# Patient Record
Sex: MALE | Race: WHITE | NOT HISPANIC OR LATINO | URBAN - METROPOLITAN AREA
[De-identification: names, ages, dates, MRNs, and addresses within clinical notes are randomized per-mention and may not be internally consistent; named-entity substitution may affect disease eponyms.]

---

## 2022-10-12 ENCOUNTER — NEW PATIENT (OUTPATIENT)
Dept: URBAN - METROPOLITAN AREA CLINIC 6 | Facility: CLINIC | Age: 72
End: 2022-10-12

## 2022-10-12 DIAGNOSIS — H25.813: ICD-10-CM

## 2022-10-12 PROCEDURE — 92004 COMPRE OPH EXAM NEW PT 1/>: CPT

## 2022-10-12 ASSESSMENT — TONOMETRY
OD_IOP_MMHG: 18
OS_IOP_MMHG: 20

## 2022-10-12 ASSESSMENT — VISUAL ACUITY
OD_CC: 20/100
OS_GLARE: 20/400
OS_PH: 20/70
OS_CC: 20/100

## 2022-11-09 ENCOUNTER — PRE-OP CATARACT MEASUREMENTS (OUTPATIENT)
Dept: URBAN - METROPOLITAN AREA CLINIC 6 | Facility: CLINIC | Age: 72
End: 2022-11-09

## 2022-11-09 DIAGNOSIS — H25.813: ICD-10-CM

## 2022-11-09 PROCEDURE — 92014 COMPRE OPH EXAM EST PT 1/>: CPT

## 2022-11-09 PROCEDURE — 92136 OPHTHALMIC BIOMETRY: CPT

## 2022-11-09 ASSESSMENT — VISUAL ACUITY
OS_CC: 20/100
OS_PH: 20/70
OU_CC: J3
OD_CC: 20/100+1
OD_PH: 20/70-1

## 2022-11-09 ASSESSMENT — TONOMETRY
OS_IOP_MMHG: 18
OD_IOP_MMHG: 15

## 2023-02-23 ENCOUNTER — ESTABLISHED COMPREHENSIVE EXAM (OUTPATIENT)
Dept: URBAN - METROPOLITAN AREA CLINIC 6 | Facility: CLINIC | Age: 73
End: 2023-02-23

## 2023-02-23 DIAGNOSIS — H25.813: ICD-10-CM

## 2023-02-23 DIAGNOSIS — H35.033: ICD-10-CM

## 2023-02-23 PROCEDURE — 76519 ECHO EXAM OF EYE: CPT

## 2023-02-23 PROCEDURE — 92014 COMPRE OPH EXAM EST PT 1/>: CPT

## 2023-02-23 ASSESSMENT — VISUAL ACUITY
OS_CC: 20/100
OD_CC: 20/200
OS_PH: 20/70
OU_CC: J3
OD_PH: 20/200

## 2023-02-23 ASSESSMENT — KERATOMETRY
OS_AXISANGLE_DEGREES: 112
OD_AXISANGLE_DEGREES: 58
OD_K1POWER_DIOPTERS: 41.25
OD_AXISANGLE2_DEGREES: 148
OS_K1POWER_DIOPTERS: 42.75
OS_K2POWER_DIOPTERS: 44.25
OS_AXISANGLE2_DEGREES: 22
OD_K2POWER_DIOPTERS: 43.25

## 2023-02-23 ASSESSMENT — TONOMETRY
OS_IOP_MMHG: 19
OD_IOP_MMHG: 19

## 2023-03-20 ENCOUNTER — SURGERY/PROCEDURE (OUTPATIENT)
Dept: URBAN - METROPOLITAN AREA SURGICAL CENTER 6 | Facility: SURGICAL CENTER | Age: 73
End: 2023-03-20

## 2023-03-20 DIAGNOSIS — H25.811: ICD-10-CM

## 2023-03-20 PROCEDURE — 66984 XCAPSL CTRC RMVL W/O ECP: CPT

## 2023-03-21 ENCOUNTER — 1 DAY POST-OP (OUTPATIENT)
Dept: URBAN - METROPOLITAN AREA CLINIC 6 | Facility: CLINIC | Age: 73
End: 2023-03-21

## 2023-03-21 DIAGNOSIS — H25.812: ICD-10-CM

## 2023-03-21 DIAGNOSIS — Z96.1: ICD-10-CM

## 2023-03-21 PROCEDURE — 99024 POSTOP FOLLOW-UP VISIT: CPT

## 2023-03-21 PROCEDURE — 76519 ECHO EXAM OF EYE: CPT | Mod: 26,LT

## 2023-03-21 ASSESSMENT — KERATOMETRY
OS_AXISANGLE2_DEGREES: 22
OD_K1POWER_DIOPTERS: 41.25
OD_K1POWER_DIOPTERS: 41.25
OS_K2POWER_DIOPTERS: 44.25
OD_K2POWER_DIOPTERS: 43.25
OS_AXISANGLE2_DEGREES: 22
OS_AXISANGLE_DEGREES: 112
OS_AXISANGLE_DEGREES: 112
OD_AXISANGLE2_DEGREES: 148
OD_AXISANGLE_DEGREES: 58
OD_K2POWER_DIOPTERS: 43.25
OS_K2POWER_DIOPTERS: 44.25
OS_K1POWER_DIOPTERS: 42.75
OD_AXISANGLE2_DEGREES: 148
OD_AXISANGLE_DEGREES: 58
OS_K1POWER_DIOPTERS: 42.75

## 2023-03-21 ASSESSMENT — TONOMETRY
OD_IOP_MMHG: 27
OS_IOP_MMHG: 13
OS_IOP_MMHG: 18
OD_IOP_MMHG: 22

## 2023-03-21 ASSESSMENT — VISUAL ACUITY: OD_SC: 20/60-1

## 2023-03-30 ENCOUNTER — POST-OP CHECK (OUTPATIENT)
Dept: URBAN - METROPOLITAN AREA CLINIC 6 | Facility: CLINIC | Age: 73
End: 2023-03-30

## 2023-03-30 DIAGNOSIS — Z96.1: ICD-10-CM

## 2023-03-30 DIAGNOSIS — H25.812: ICD-10-CM

## 2023-03-30 PROCEDURE — 92136 OPHTHALMIC BIOMETRY: CPT | Mod: 26,LT

## 2023-03-30 PROCEDURE — 99024 POSTOP FOLLOW-UP VISIT: CPT

## 2023-04-03 ASSESSMENT — KERATOMETRY
OD_K2POWER_DIOPTERS: 43.25
OS_K2POWER_DIOPTERS: 44.25
OD_K1POWER_DIOPTERS: 41.25
OD_AXISANGLE2_DEGREES: 148
OS_AXISANGLE_DEGREES: 112
OS_K1POWER_DIOPTERS: 42.75
OD_AXISANGLE_DEGREES: 58
OS_AXISANGLE2_DEGREES: 22

## 2023-04-03 ASSESSMENT — TONOMETRY
OD_IOP_MMHG: 21
OS_IOP_MMHG: 17

## 2023-04-03 ASSESSMENT — VISUAL ACUITY
OD_SC: 20/60+2
OS_PH: 20/200
OS_SC: 20/400
OD_PH: 20/50

## 2023-04-10 ENCOUNTER — SURGERY/PROCEDURE (OUTPATIENT)
Dept: URBAN - METROPOLITAN AREA SURGICAL CENTER 6 | Facility: SURGICAL CENTER | Age: 73
End: 2023-04-10

## 2023-04-10 DIAGNOSIS — H25.812: ICD-10-CM

## 2023-04-10 PROCEDURE — 66984 XCAPSL CTRC RMVL W/O ECP: CPT | Mod: 79,LT

## 2023-04-11 ENCOUNTER — 1 DAY POST-OP (OUTPATIENT)
Dept: URBAN - METROPOLITAN AREA CLINIC 6 | Facility: CLINIC | Age: 73
End: 2023-04-11

## 2023-04-11 DIAGNOSIS — H25.812: ICD-10-CM

## 2023-04-11 DIAGNOSIS — Z96.1: ICD-10-CM

## 2023-04-11 PROCEDURE — 99024 POSTOP FOLLOW-UP VISIT: CPT

## 2023-04-11 ASSESSMENT — KERATOMETRY
OD_K2POWER_DIOPTERS: 43.25
OD_K2POWER_DIOPTERS: 43.25
OS_K2POWER_DIOPTERS: 44.25
OD_K1POWER_DIOPTERS: 41.25
OD_AXISANGLE_DEGREES: 58
OD_AXISANGLE2_DEGREES: 148
OS_K1POWER_DIOPTERS: 42.75
OD_AXISANGLE_DEGREES: 58
OS_AXISANGLE_DEGREES: 112
OS_K2POWER_DIOPTERS: 44.25
OD_K1POWER_DIOPTERS: 41.25
OS_K1POWER_DIOPTERS: 42.75
OS_AXISANGLE2_DEGREES: 22
OD_AXISANGLE2_DEGREES: 148
OS_AXISANGLE2_DEGREES: 22
OS_AXISANGLE_DEGREES: 112

## 2023-04-11 ASSESSMENT — TONOMETRY
OD_IOP_MMHG: 19
OS_IOP_MMHG: 28

## 2023-04-11 ASSESSMENT — VISUAL ACUITY
OD_SC: 20/50-1
OU_SC: J7-2
OD_PH: 20/50+2
OS_PH: 20/40-1
OS_SC: 20/40-1

## 2024-11-02 ENCOUNTER — HOSPITAL ENCOUNTER (INPATIENT)
Facility: HOSPITAL | Age: 74
LOS: 1 days | Discharge: HOME HEALTH | DRG: 101 | End: 2024-11-05
Attending: EMERGENCY MEDICINE | Admitting: FAMILY MEDICINE
Payer: COMMERCIAL

## 2024-11-02 ENCOUNTER — APPOINTMENT (EMERGENCY)
Dept: CT IMAGING | Facility: HOSPITAL | Age: 74
DRG: 101 | End: 2024-11-02
Payer: COMMERCIAL

## 2024-11-02 DIAGNOSIS — R41.82 ALTERED MENTAL STATUS, UNSPECIFIED ALTERED MENTAL STATUS TYPE: ICD-10-CM

## 2024-11-02 DIAGNOSIS — F03.A0 MILD DEMENTIA WITHOUT BEHAVIORAL DISTURBANCE, PSYCHOTIC DISTURBANCE, MOOD DISTURBANCE, OR ANXIETY, UNSPECIFIED DEMENTIA TYPE (HCC): ICD-10-CM

## 2024-11-02 DIAGNOSIS — G40.919 BREAKTHROUGH SEIZURE (HCC): ICD-10-CM

## 2024-11-02 DIAGNOSIS — Z86.73 CHRONIC CEREBROVASCULAR ACCIDENT (CVA): ICD-10-CM

## 2024-11-02 DIAGNOSIS — R29.810 FACIAL DROOP: Primary | ICD-10-CM

## 2024-11-02 DIAGNOSIS — I63.9 STROKE (HCC): ICD-10-CM

## 2024-11-02 DIAGNOSIS — R56.9 SEIZURE (HCC): ICD-10-CM

## 2024-11-02 DIAGNOSIS — I10 PRIMARY HYPERTENSION: ICD-10-CM

## 2024-11-02 DIAGNOSIS — G83.84 TODD'S PARALYSIS (POSTEPILEPTIC) (HCC): ICD-10-CM

## 2024-11-02 PROBLEM — F03.90 DEMENTIA (HCC): Status: ACTIVE | Noted: 2024-11-02

## 2024-11-02 LAB
2HR DELTA HS TROPONIN: 11 NG/L
ANION GAP SERPL CALCULATED.3IONS-SCNC: 12 MMOL/L (ref 4–13)
APTT PPP: 23 SECONDS (ref 23–34)
BUN SERPL-MCNC: 20 MG/DL (ref 5–25)
CALCIUM SERPL-MCNC: 8.7 MG/DL (ref 8.4–10.2)
CARBAMAZEPINE SERPL-MCNC: 7.1 UG/ML (ref 4–12)
CARDIAC TROPONIN I PNL SERPL HS: 19 NG/L
CARDIAC TROPONIN I PNL SERPL HS: 8 NG/L
CHLORIDE SERPL-SCNC: 100 MMOL/L (ref 96–108)
CO2 SERPL-SCNC: 19 MMOL/L (ref 21–32)
CREAT SERPL-MCNC: 1.24 MG/DL (ref 0.6–1.3)
ERYTHROCYTE [DISTWIDTH] IN BLOOD BY AUTOMATED COUNT: 13.4 % (ref 11.6–15.1)
GFR SERPL CREATININE-BSD FRML MDRD: 56 ML/MIN/1.73SQ M
GLUCOSE SERPL-MCNC: 135 MG/DL (ref 65–140)
GLUCOSE SERPL-MCNC: 92 MG/DL (ref 65–140)
HCT VFR BLD AUTO: 40.9 % (ref 36.5–49.3)
HGB BLD-MCNC: 13.6 G/DL (ref 12–17)
INR PPP: 0.98 (ref 0.85–1.19)
MCH RBC QN AUTO: 31.6 PG (ref 26.8–34.3)
MCHC RBC AUTO-ENTMCNC: 33.3 G/DL (ref 31.4–37.4)
MCV RBC AUTO: 95 FL (ref 82–98)
PLATELET # BLD AUTO: 177 THOUSANDS/UL (ref 149–390)
PMV BLD AUTO: 9.1 FL (ref 8.9–12.7)
POTASSIUM SERPL-SCNC: 4.3 MMOL/L (ref 3.5–5.3)
PROTHROMBIN TIME: 13.7 SECONDS (ref 12.3–15)
RBC # BLD AUTO: 4.3 MILLION/UL (ref 3.88–5.62)
SODIUM SERPL-SCNC: 131 MMOL/L (ref 135–147)
TSH SERPL DL<=0.05 MIU/L-ACNC: 5.03 UIU/ML (ref 0.45–4.5)
WBC # BLD AUTO: 7.23 THOUSAND/UL (ref 4.31–10.16)

## 2024-11-02 PROCEDURE — 80156 ASSAY CARBAMAZEPINE TOTAL: CPT

## 2024-11-02 PROCEDURE — 85027 COMPLETE CBC AUTOMATED: CPT | Performed by: EMERGENCY MEDICINE

## 2024-11-02 PROCEDURE — 99285 EMERGENCY DEPT VISIT HI MDM: CPT | Performed by: EMERGENCY MEDICINE

## 2024-11-02 PROCEDURE — 36415 COLL VENOUS BLD VENIPUNCTURE: CPT | Performed by: EMERGENCY MEDICINE

## 2024-11-02 PROCEDURE — 80061 LIPID PANEL: CPT

## 2024-11-02 PROCEDURE — 70450 CT HEAD/BRAIN W/O DYE: CPT

## 2024-11-02 PROCEDURE — 84484 ASSAY OF TROPONIN QUANT: CPT | Performed by: EMERGENCY MEDICINE

## 2024-11-02 PROCEDURE — 85730 THROMBOPLASTIN TIME PARTIAL: CPT | Performed by: EMERGENCY MEDICINE

## 2024-11-02 PROCEDURE — 99285 EMERGENCY DEPT VISIT HI MDM: CPT

## 2024-11-02 PROCEDURE — 85610 PROTHROMBIN TIME: CPT | Performed by: EMERGENCY MEDICINE

## 2024-11-02 PROCEDURE — 82948 REAGENT STRIP/BLOOD GLUCOSE: CPT

## 2024-11-02 PROCEDURE — 99222 1ST HOSP IP/OBS MODERATE 55: CPT | Performed by: STUDENT IN AN ORGANIZED HEALTH CARE EDUCATION/TRAINING PROGRAM

## 2024-11-02 PROCEDURE — 93005 ELECTROCARDIOGRAM TRACING: CPT

## 2024-11-02 PROCEDURE — 84443 ASSAY THYROID STIM HORMONE: CPT

## 2024-11-02 PROCEDURE — 80048 BASIC METABOLIC PNL TOTAL CA: CPT | Performed by: EMERGENCY MEDICINE

## 2024-11-02 RX ORDER — DONEPEZIL HYDROCHLORIDE 10 MG/1
10 TABLET, FILM COATED ORAL
COMMUNITY
Start: 2024-10-07

## 2024-11-02 RX ORDER — GABAPENTIN 300 MG/1
300 CAPSULE ORAL
COMMUNITY
Start: 2024-10-07

## 2024-11-02 RX ORDER — ASPIRIN 81 MG/1
81 TABLET, CHEWABLE ORAL DAILY
Status: DISCONTINUED | OUTPATIENT
Start: 2024-11-03 | End: 2024-11-02

## 2024-11-02 RX ORDER — ENOXAPARIN SODIUM 100 MG/ML
40 INJECTION SUBCUTANEOUS DAILY
Status: DISCONTINUED | OUTPATIENT
Start: 2024-11-03 | End: 2024-11-05 | Stop reason: HOSPADM

## 2024-11-02 RX ORDER — CLOPIDOGREL BISULFATE 75 MG/1
300 TABLET ORAL ONCE
Status: DISCONTINUED | OUTPATIENT
Start: 2024-11-02 | End: 2024-11-02

## 2024-11-02 RX ORDER — CARBAMAZEPINE 100 MG/1
300 TABLET, EXTENDED RELEASE ORAL 2 TIMES DAILY
Status: DISCONTINUED | OUTPATIENT
Start: 2024-11-02 | End: 2024-11-04

## 2024-11-02 RX ORDER — ATORVASTATIN CALCIUM 40 MG/1
40 TABLET, FILM COATED ORAL EVERY EVENING
Status: DISCONTINUED | OUTPATIENT
Start: 2024-11-02 | End: 2024-11-05 | Stop reason: HOSPADM

## 2024-11-02 RX ORDER — LOSARTAN POTASSIUM 100 MG/1
TABLET ORAL
COMMUNITY
Start: 2024-10-07 | End: 2024-11-05

## 2024-11-02 RX ORDER — CARBAMAZEPINE 200 MG/1
TABLET ORAL
COMMUNITY
Start: 2024-07-01 | End: 2024-11-05

## 2024-11-02 RX ORDER — ASPIRIN 81 MG/1
81 TABLET, CHEWABLE ORAL DAILY
Status: DISCONTINUED | OUTPATIENT
Start: 2024-11-02 | End: 2024-11-05 | Stop reason: HOSPADM

## 2024-11-02 RX ADMIN — ATORVASTATIN CALCIUM 40 MG: 40 TABLET, FILM COATED ORAL at 22:00

## 2024-11-02 RX ADMIN — CARBAMAZEPINE 300 MG: 100 TABLET, EXTENDED RELEASE ORAL at 22:30

## 2024-11-02 RX ADMIN — ASPIRIN 81 MG 81 MG: 81 TABLET ORAL at 22:00

## 2024-11-02 NOTE — H&P
H&P - Hospitalist   Name: Jon Morton 74 y.o. male I MRN: [id number]  Unit/Bed#: ED-42 I Date of Admission: 11/2/2024   Date of Service: 11/2/2024 I Hospital Day: 0     Assessment & Plan  Stroke (HCC)  NIH score on my evaluation: 0  CT head: Lacunar type infarcts in the right thalamus and right cerebellum are new since prior exam but appear chronic on CT. These were described on the outside head CT dated 5/23/2023.     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  With findings on CT scan we will place patient on stroke pathway    Plan  -neuro consult  -MRI carlin  -continue asprin 81mg home medication  -load plavix with 300mg   -Echocardiogram with bubble study  -PT + ot eval  Tele  Neuro checks   Speech and swallow eval   Seizure (HCC)  Patient follows with [facility] neurology last appointment 4/13/2023.  Should be taking carBAMazepine (TEGretol) 200 mg tablet .  Patient lost to follow-up. Suspect poor compliance with medication. Hx suggests absence seizures but unlikely due to subjective family stating onset was 4 to 5 years ago.     No evidence of fall, tongue biting or loss of bladder function on exam.    Consider Seizure causes although less likely, Not hypoglycemic, hyponatremia on CMP is chronic compared to prior labs, no evidence of trauma on exam or on CT scan, no evidence of infection    Suspect stroke symptoms above are due to todds paralysis in setting of breakthrough seizure versus medication non-complience.     Plan   -Check carbamazepine level  -Neurology consult  -seizure precautions  -check TSH  HTN (hypertension)  Patient has not seen a physician in greater than a year in a half. Patient has history of HTN on unknown dose amlodipine and losartan.       Plan   -Will allow permissive hypertension for 48 hours in setting of acute on chronic stroke. Hold medications  -resume home hypertension regimen upon confirmation of dosage from family after acute stroke is  ruled out or 48 hours.  Family will bring dosage in confirm  -Questionable medication compliance as patient has baseline dementia and lives alone and manages his own medications.  Dementia (HCC)    Patient and family report that he has baseline dementia on its Aricept of unknown dosage.  No mini cog eval on file.     Aoax3 on evaluation.     Plan  Hold Aricept until dosage is confirmed  Consider geriatrics eval prior to dc   Pt/ot eval and case management to ensure safe discharge      VTE Pharmacologic Prophylaxis: VTE Score: 8 High Risk (Score >/= 5) - Pharmacological DVT Prophylaxis Ordered: enoxaparin (Lovenox). Sequential Compression Devices Ordered.  Code Status: Level 1 - Full Code   Discussion with family: Updated  (niece) at bedside.    Anticipated Length of Stay: Patient will be admitted on an inpatient basis with an anticipated length of stay of greater than 2 midnights secondary to seizure and stroke.    History of Present Illness   Chief Complaint: altered mental status and weakness    Jon Morton is a 74 y.o. male with a PMH of HTN, epilepsy,  who presents with altered mental status, weakness and unresponsiveness. Stroke alert was called by ems.  Per report patient was found staring off into space and unresponsive sitting in his chair on the porch by neighbors nearby.  Report mentioned that patient had facial droop.  Niece reports this is characteristic of his chronic seizures.  Stroke alert was discontinued in emergency room.  Upon evaluation in the emergency room patient was confused but no focal neurologic deficits or evidence of trauma.  Family denies any tongue biting or urination upon finding him at the house.    Niece notes that patient lives alone by himself and manages own medication although he has baseline dementia.  She also notes that he has not seen a doctor in greater than 1 year.  She states that he is confused but has capacity to manage his own medications.  She  states that he ambulates by himself.    In ED comprehensive metabolic panel was unremarkable except yielding a sodium of 131 which appears to be at baseline for his chronic hyponatremia.  CBC unremarkable.  CT head and neck showed no acute intracranial abnormality.  But there are lacunar infarcts of the right thalamus and right cell abdominal which are new from prior exam but appear chronic on CT. EKG NSR no new st elevation. Trops neg x1.     Review of Systems   Constitutional:  Negative for chills, fatigue and fever.   HENT:  Negative for congestion, rhinorrhea and tinnitus.    Eyes:  Negative for photophobia and visual disturbance.   Respiratory:  Negative for chest tightness and shortness of breath.    Cardiovascular:  Negative for chest pain and palpitations.   Gastrointestinal:  Negative for abdominal pain, blood in stool, diarrhea, nausea and vomiting.   Genitourinary:  Negative for difficulty urinating.   Skin:  Negative for color change, pallor, rash and wound.   Neurological:  Positive for seizures and weakness. Negative for dizziness, syncope, facial asymmetry, numbness and headaches.   Psychiatric/Behavioral:  Negative for sleep disturbance and suicidal ideas. The patient is not nervous/anxious.        Historical Information   No past medical history on file.  No past surgical history on file.  Social History     Tobacco Use    Smoking status: Not on file    Smokeless tobacco: Not on file   Substance and Sexual Activity    Alcohol use: Not on file    Drug use: Not on file    Sexual activity: Not on file     No existing history information found.  No existing history information found.  No family history on file.  Social History:  Marital Status:   Occupation: Retired  Patient Pre-hospital Living Situation: Home  Patient Pre-hospital Level of Mobility: walks  Patient Pre-hospital Diet Restrictions: none    Meds/Allergies   I have been unable to obtain / verify an up to date medication list despite  all reasonable attempts. Family is going to bring in medications at his own apartment.   Prior to Admission medications    Not on File     Not on File    Objective :  HR:  [85] 85  BP: (168)/(93) 168/93  Resp:  [18] 18  SpO2:  [98 %] 98 %  O2 Device: None (Room air)    Physical Exam  Vitals reviewed. Exam conducted with a chaperone present.   Constitutional:       General: He is not in acute distress.     Appearance: Normal appearance. He is not ill-appearing or toxic-appearing.   HENT:      Head: Normocephalic and atraumatic.      Right Ear: External ear normal.      Left Ear: External ear normal.      Nose: No congestion or rhinorrhea.      Mouth/Throat:      Pharynx: No oropharyngeal exudate or posterior oropharyngeal erythema.      Comments: No evidence of trauma   Eyes:      General: No visual field deficit or scleral icterus.        Right eye: No discharge.         Left eye: No discharge.      Extraocular Movements: Extraocular movements intact.      Conjunctiva/sclera: Conjunctivae normal.      Pupils: Pupils are equal, round, and reactive to light.   Cardiovascular:      Rate and Rhythm: Normal rate and regular rhythm.      Pulses: Normal pulses.      Heart sounds: Murmur (3/6 systolic ejection) heard.   Pulmonary:      Effort: Pulmonary effort is normal. No respiratory distress.      Breath sounds: Normal breath sounds. No stridor. No wheezing or rhonchi.   Abdominal:      General: Bowel sounds are normal. There is no distension.      Palpations: Abdomen is soft.      Tenderness: There is no abdominal tenderness. There is no guarding.   Musculoskeletal:      Cervical back: Normal range of motion and neck supple. No rigidity or tenderness.      Right lower leg: No edema.      Left lower leg: No edema.   Skin:     General: Skin is warm.      Capillary Refill: Capillary refill takes less than 2 seconds.      Coloration: Skin is not jaundiced or pale.      Findings: No bruising, erythema, lesion or rash.  "  Neurological:      General: No focal deficit present.      Mental Status: He is alert and oriented to person, place, and time. Mental status is at baseline.      Cranial Nerves: No cranial nerve deficit, dysarthria or facial asymmetry.      Sensory: Sensation is intact. No sensory deficit.      Motor: No weakness, tremor, atrophy, abnormal muscle tone or pronator drift.      Coordination: Coordination is intact. Coordination normal. Finger-Nose-Finger Test and Heel to Shin Test normal.      Deep Tendon Reflexes: Babinski sign absent on the right side. Babinski sign absent on the left side.      Comments: NIH score: 0   Psychiatric:         Mood and Affect: Mood normal.         Behavior: Behavior normal.         Thought Content: Thought content normal.          Lines/Drains:            Lab Results: I have reviewed the following results:  Results from last 7 days   Lab Units 11/02/24  1721   WBC Thousand/uL 7.23   HEMOGLOBIN g/dL 13.6   HEMATOCRIT % 40.9   PLATELETS Thousands/uL 177     Results from last 7 days   Lab Units 11/02/24  1721   SODIUM mmol/L 131*   POTASSIUM mmol/L 4.3   CHLORIDE mmol/L 100   CO2 mmol/L 19*   BUN mg/dL 20   CREATININE mg/dL 1.24   ANION GAP mmol/L 12   CALCIUM mg/dL 8.7   GLUCOSE RANDOM mg/dL 135             No results found for: \"HGBA1C\"        Imaging Results Review: I personally reviewed the following image studies/reports in PACS and discussed pertinent findings with Radiology: CT head. My interpretation of the radiology images/reports is: Lacunar type infarcts in the right thalamus and right cerebellum are new since prior exam but appear chronic on CT. These were described on the outside head CT dated 5/23/2023.  Other Study Results Review: EKG was reviewed.     Administrative Statements     Jose Cabral,   PGY-2 Family Medicine       ** Please Note: This note has been constructed using a voice recognition system. **    "

## 2024-11-02 NOTE — ED PROVIDER NOTES
Time reflects when diagnosis was documented in both MDM as applicable and the Disposition within this note       Time User Action Codes Description Comment    11/2/2024  5:00 PM Maci Nielson Add [R29.810] Facial droop     11/2/2024  6:23 PM AgrestBlaze marcelinoin J Add [G40.919] Breakthrough seizure (HCC)     11/2/2024  6:23 PM Agresti, Werner J Add [G83.84] 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HCC)     11/2/2024  6:23 PM Agresti, Werner J Add [R41.82] Altered mental status, unspecified altered mental status type     11/2/2024  6:23 PM Agresti, Werner J Add [Z86.73] Chronic cerebrovascular accident (CVA)     11/2/2024  6:23 PM Agresti Werner J Modify [Z86.73] Chronic cerebrovascular accident (CVA) but new compared to previous CT scan           ED Disposition       ED Disposition   Admit    Condition   Stable    Date/Time   Sat Nov 2, 2024  6:22 PM    Comment   Case was discussed with Dr. Gage and the patient's admission status was agreed to be Admission Status: observation status to the service of Dr. Gage .               Assessment & Plan       Medical Decision Making        Initial ED assessment:    74-year-old male presenting as a prehospital stroke alert, weakness to the left side, resolved upon ED arrival    Pathology at risk for includes but is not limited to:    Seizure with Kody's paralysis, CVA, intracranial hemorrhage    Initial ED plan:   Stroke alert was canceled due to rapid improvement upon ED arrival and seizure at onset, more fitting with a Kody's paralysis        Final ED summary/disposition:   After evaluation and workup in the emergency department, ultimately admitted to internal medicine service due to failure to return to baseline, still mildly confused still mild weakness and evidence of lacunar infarcts on CT    Amount and/or Complexity of Data Reviewed  Labs: ordered.  Radiology: ordered.    Risk  Decision regarding hospitalization.        ED Course as of 11/02/24 1826   Sat Nov 02, 2024   1820 Repeat  evaluation, unchanged still a bit confused, CT showing old lacunar strokes, will admit to hospital for further workup and evaluation.       Medications - No data to display    ED Risk Strat Scores                                               History of Present Illness       Chief Complaint   Patient presents with    STROKE Alert       No past medical history on file.   No past surgical history on file.   No family history on file.       No existing history information found.   No existing history information found.   I have reviewed and agree with the history as documented.           74-year-old male, brought in as a prehospital stroke alert.  I took command call from paramedics, patient was seen by his neighbor sitting in a chair outside had a 2-minute witnessed seizure, postseizure patient had flaccid paralysis on the left, no signs of trauma no signs of fall or injury.,  Unclear seizure history.  Upon paramedic arrival the patient's weakness significantly improved if not completely resolved.  At this time CT of the head was underway but decided to cancel the remainder of the stroke alert, CTA was not performed.  Patient himself is confused, he denies having a seizure history denies having a seizure today is unsure how he got to the hospital, as it was a prehospital alert was able to review old records and patient does in fact have a seizure history.  Patient currently denies headache denies neck pain denies chest pain.  But history unreliable as patient is confused.      History provided by:  Patient, EMS personnel and relative  History limited by:  Acuity of condition  STROKE Alert      Review of Systems   Unable to perform ROS: Mental status change           Objective       ED Triage Vitals   Temp Pulse BP Resp SpO2 Patient Position - Orthostatic VS   -- -- -- -- -- --      Temp src Heart Rate Source BP Location FiO2 (%) Pain Score    -- -- -- -- --      Vitals    None         Physical Exam  Vitals reviewed.    Constitutional:       General: He is not in acute distress.     Appearance: He is well-developed. He is not diaphoretic.   HENT:      Head: Normocephalic and atraumatic.      Right Ear: External ear normal.      Left Ear: External ear normal.      Nose: Nose normal.   Eyes:      General:         Right eye: No discharge.         Left eye: No discharge.      Pupils: Pupils are equal, round, and reactive to light.   Neck:      Trachea: No tracheal deviation.   Cardiovascular:      Rate and Rhythm: Normal rate and regular rhythm.      Heart sounds: Normal heart sounds. No murmur heard.  Pulmonary:      Effort: Pulmonary effort is normal. No respiratory distress.      Breath sounds: Normal breath sounds. No stridor.   Abdominal:      General: There is no distension.      Palpations: Abdomen is soft.      Tenderness: There is no abdominal tenderness. There is no guarding or rebound.   Musculoskeletal:         General: Normal range of motion.      Cervical back: Normal range of motion and neck supple.   Skin:     General: Skin is warm and dry.      Coloration: Skin is not pale.      Findings: No erythema.   Neurological:      Mental Status: He is alert. He is disoriented.      Comments: GCS 14, disorientated to place and time 4 out of 5 muscle strength/drift on the left arm and left leg.  Normal speech although confused         Results Reviewed       Procedure Component Value Units Date/Time    Protime-INR [[id number]] Updated: 11/02/24 1803    Lab Status: No result Specimen: Blood from Arm, Left     APTT [[id number]] Updated: 11/02/24 1803    Lab Status: No result Specimen: Blood from Arm, Left     HS Troponin I 2hr [[id number]]     Lab Status: No result Specimen: Blood     HS Troponin 0hr (reflex protocol) [[id number]]  (Normal) Collected: 11/02/24 1721    Lab Status: Final result Specimen: Blood from Arm, Left Updated: 11/02/24 1758     hs TnI 0hr 8 ng/L     Basic metabolic panel [[id number]]  (Abnormal) Collected:  11/02/24 1721    Lab Status: Final result Specimen: Blood from Arm, Left Updated: 11/02/24 1753     Sodium 131 mmol/L      Potassium 4.3 mmol/L      Chloride 100 mmol/L      CO2 19 mmol/L      ANION GAP 12 mmol/L      BUN 20 mg/dL      Creatinine 1.24 mg/dL      Glucose 135 mg/dL      Calcium 8.7 mg/dL      eGFR 56 ml/min/1.73sq m     Narrative:      National [employer] guidelines for Chronic Kidney Disease (CKD):     Stage 1 with normal or high GFR (GFR > 90 mL/min/1.73 square meters)    Stage 2 Mild CKD (GFR = 60-89 mL/min/1.73 square meters)    Stage 3A Moderate CKD (GFR = 45-59 mL/min/1.73 square meters)    Stage 3B Moderate CKD (GFR = 30-44 mL/min/1.73 square meters)    Stage 4 Severe CKD (GFR = 15-29 mL/min/1.73 square meters)    Stage 5 End Stage CKD (GFR <15 mL/min/1.73 square meters)  Note: GFR calculation is accurate only with a steady state creatinine    CBC and Platelet [[id number]]  (Normal) Collected: 11/02/24 1721    Lab Status: Final result Specimen: Blood from Arm, Left Updated: 11/02/24 1730     WBC 7.23 Thousand/uL      RBC 4.30 Million/uL      Hemoglobin 13.6 g/dL      Hematocrit 40.9 %      MCV 95 fL      MCH 31.6 pg      MCHC 33.3 g/dL      RDW 13.4 %      Platelets 177 Thousands/uL      MPV 9.1 fL             CT head wo contrast   Final Interpretation by Jose R Modi MD (11/02 1807)      No acute intracranial abnormality.   Lacunar type infarcts in the right thalamus and right cerebellum are new since prior exam but appear chronic on CT. These were described on the outside head CT dated 5/23/2023.               Workstation performed: [id number]             ECG 12 Lead Documentation Only    Date/Time: 11/2/2024 5:44 PM    Performed by: Werner Crooks DO  Authorized by: Werner Crooks DO    ECG reviewed by me, the ED Provider: yes    Patient location:  ED  Previous ECG:     Previous ECG:  Compared to current    Comparison ECG info:  12.20.2007    Similarity:  No  change  Interpretation:     Interpretation: non-specific    Rate:     ECG rate:  84    ECG rate assessment: normal    Rhythm:     Rhythm: sinus rhythm    Ectopy:     Ectopy: none    QRS:     QRS axis:  Normal    QRS intervals:  Normal  Conduction:     Conduction: normal    ST segments:     ST segments:  Non-specific  T waves:     T waves: non-specific        ED Medication and Procedure Management   None     Patient's Medications    No medications on file     No discharge procedures on file.  ED SEPSIS DOCUMENTATION   Time reflects when diagnosis was documented in both MDM as applicable and the Disposition within this note       Time User Action Codes Description Comment    11/2/2024  5:00 PM Maci Nielson Add [R29.810] Facial droop     11/2/2024  6:23 PM Werner Crooks [G40.919] Breakthrough seizure (HCC)     11/2/2024  6:23 PM Werner Crooks Add [G83.84] 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[facility])     11/2/2024  6:23 PM Werner Crooks Add [R41.82] Altered mental status, unspecified altered mental status type     11/2/2024  6:23 PM Werner Crooks Add [Z86.73] Chronic cerebrovascular accident (CVA)     11/2/2024  6:23 PM Werner Crooks Modify [Z86.73] Chronic cerebrovascular accident (CVA) but new compared to previous CT scan                  Werner Crooks DO  11/02/24 1826

## 2024-11-03 ENCOUNTER — APPOINTMENT (OUTPATIENT)
Dept: NON INVASIVE DIAGNOSTICS | Facility: HOSPITAL | Age: 74
DRG: 101 | End: 2024-11-03
Payer: COMMERCIAL

## 2024-11-03 ENCOUNTER — APPOINTMENT (OUTPATIENT)
Dept: MRI IMAGING | Facility: HOSPITAL | Age: 74
DRG: 101 | End: 2024-11-03
Payer: COMMERCIAL

## 2024-11-03 LAB
ALBUMIN SERPL BCG-MCNC: 3.4 G/DL (ref 3.5–5)
ALP SERPL-CCNC: 91 U/L (ref 34–104)
ALT SERPL W P-5'-P-CCNC: 11 U/L (ref 7–52)
ANION GAP SERPL CALCULATED.3IONS-SCNC: 9 MMOL/L (ref 4–13)
AST SERPL W P-5'-P-CCNC: 20 U/L (ref 13–39)
ATRIAL RATE: 84 BPM
BILIRUB SERPL-MCNC: 0.71 MG/DL (ref 0.2–1)
BUN SERPL-MCNC: 16 MG/DL (ref 5–25)
CALCIUM ALBUM COR SERPL-MCNC: 9 MG/DL (ref 8.3–10.1)
CALCIUM SERPL-MCNC: 8.5 MG/DL (ref 8.4–10.2)
CHLORIDE SERPL-SCNC: 100 MMOL/L (ref 96–108)
CHOLEST SERPL-MCNC: 179 MG/DL
CO2 SERPL-SCNC: 23 MMOL/L (ref 21–32)
CREAT SERPL-MCNC: 1.1 MG/DL (ref 0.6–1.3)
EST. AVERAGE GLUCOSE BLD GHB EST-MCNC: 105 MG/DL
GFR SERPL CREATININE-BSD FRML MDRD: 65 ML/MIN/1.73SQ M
GLUCOSE P FAST SERPL-MCNC: 95 MG/DL (ref 65–99)
GLUCOSE SERPL-MCNC: 95 MG/DL (ref 65–140)
HBA1C MFR BLD: 5.3 %
HDLC SERPL-MCNC: 67 MG/DL
LDLC SERPL CALC-MCNC: 100 MG/DL (ref 0–100)
P AXIS: 84 DEGREES
POTASSIUM SERPL-SCNC: 4.1 MMOL/L (ref 3.5–5.3)
PR INTERVAL: 146 MS
PROT SERPL-MCNC: 6.1 G/DL (ref 6.4–8.4)
QRS AXIS: 61 DEGREES
QRSD INTERVAL: 100 MS
QT INTERVAL: 398 MS
QTC INTERVAL: 470 MS
SODIUM SERPL-SCNC: 132 MMOL/L (ref 135–147)
T WAVE AXIS: 80 DEGREES
TRIGL SERPL-MCNC: 58 MG/DL
VENTRICULAR RATE: 84 BPM

## 2024-11-03 PROCEDURE — 92610 EVALUATE SWALLOWING FUNCTION: CPT

## 2024-11-03 PROCEDURE — 93010 ELECTROCARDIOGRAM REPORT: CPT | Performed by: INTERNAL MEDICINE

## 2024-11-03 PROCEDURE — 97129 THER IVNTJ 1ST 15 MIN: CPT

## 2024-11-03 PROCEDURE — 99222 1ST HOSP IP/OBS MODERATE 55: CPT | Performed by: STUDENT IN AN ORGANIZED HEALTH CARE EDUCATION/TRAINING PROGRAM

## 2024-11-03 PROCEDURE — NC001 PR NO CHARGE: Performed by: STUDENT IN AN ORGANIZED HEALTH CARE EDUCATION/TRAINING PROGRAM

## 2024-11-03 PROCEDURE — 80053 COMPREHEN METABOLIC PANEL: CPT

## 2024-11-03 PROCEDURE — 83036 HEMOGLOBIN GLYCOSYLATED A1C: CPT

## 2024-11-03 PROCEDURE — 97163 PT EVAL HIGH COMPLEX 45 MIN: CPT

## 2024-11-03 PROCEDURE — 99232 SBSQ HOSP IP/OBS MODERATE 35: CPT | Performed by: FAMILY MEDICINE

## 2024-11-03 PROCEDURE — 97167 OT EVAL HIGH COMPLEX 60 MIN: CPT

## 2024-11-03 PROCEDURE — A9585 GADOBUTROL INJECTION: HCPCS | Performed by: FAMILY MEDICINE

## 2024-11-03 PROCEDURE — 70553 MRI BRAIN STEM W/O & W/DYE: CPT

## 2024-11-03 RX ORDER — GADOBUTROL 604.72 MG/ML
10 INJECTION INTRAVENOUS
Status: COMPLETED | OUTPATIENT
Start: 2024-11-03 | End: 2024-11-03

## 2024-11-03 RX ADMIN — CARBAMAZEPINE 300 MG: 100 TABLET, EXTENDED RELEASE ORAL at 17:14

## 2024-11-03 RX ADMIN — CARBAMAZEPINE 300 MG: 100 TABLET, EXTENDED RELEASE ORAL at 08:31

## 2024-11-03 RX ADMIN — GADOBUTROL 10 ML: 604.72 INJECTION INTRAVENOUS at 16:42

## 2024-11-03 RX ADMIN — ASPIRIN 81 MG 81 MG: 81 TABLET ORAL at 08:30

## 2024-11-03 RX ADMIN — ATORVASTATIN CALCIUM 40 MG: 40 TABLET, FILM COATED ORAL at 21:00

## 2024-11-03 RX ADMIN — ENOXAPARIN SODIUM 40 MG: 40 INJECTION SUBCUTANEOUS at 08:33

## 2024-11-03 NOTE — ASSESSMENT & PLAN NOTE
NIH score on my evaluation: 0  CT head: Lacunar type infarcts in the right thalamus and right cerebellum are new since prior exam but appear chronic on CT. These were described on the outside head CT dated 5/23/2023.     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  With findings on CT scan we will place patient on stroke pathway    Plan  -neuro consult  -MRI carlin  -continue asprin 81mg home medication  -load plavix with 300mg   -Echocardiogram with bubble study  -PT + ot eval  Tele  Neuro checks   Speech and swallow eval

## 2024-11-03 NOTE — ASSESSMENT & PLAN NOTE
NIH score on evaluation 11/2: 0  CT head: Lacunar type infarcts in the right thalamus and right cerebellum are new since prior exam but appear chronic on CT. These were described on the outside head CT dated 5/23/2023.     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  With findings on CT scan we will place patient on stroke pathway    Plan  - neuro consult  - MRI carlin  - continue asprin 81mg home medication  - Echocardiogram with bubble study for 1400 (11/3)  - PT + ot eval  - Tele  - Neuro checks   - Speech and swallow eval

## 2024-11-03 NOTE — CONSULTS
"Consultation - Neurology   Name: Jon Morton 74 y.o. male I MRN: [id number]  Unit/Bed#: S -01 I Date of Admission: 11/2/2024   Date of Service: 11/3/2024 I Hospital Day: 0   Inpatient consult to Neurology  Consult performed by: Luis Armando Hills MD  Consult ordered by: Jose Cabral DO        Physician Requesting Evaluation: Kunal Farris MD   Reason for Evaluation / Principal Problem: seizure, left-sided weakness  Assessment & Plan  Seizure (HCC)  Jon Morton is a 74 year old man with PMHx including HTN, epilepsy, neuropathy who presented to the evaluation for evaluation s/p seizure. Witnessed to have a GTC prior to arrival with post-ictal confusion and paralysis of the LUE/LLE, which both quickly improved by the time he arrived to the ED for evaluation, although still had some residual weakness and confusion. This all resolved by the time he was admitted in the evening yesterday. He does report a history of seizures although was unclear on the details. He takes Carbamazepine at home but was unsure of dosing. Primary team also attempted to ask family but they were unsure as well. Per most recent medication refill note by his neurology group at [facility] in June 2024, he was recommended for 300 mg in the morning and 400 mg in the evening. No clear provoking factor for seizure. Overall, suspect breakthrough seizure with post-ictal state including Kody's paralysis. Lower suspicion for stroke/TIA although he does have risk factors for stroke and agree with MRI brain for further evaluation. He is at his baseline at this time.     CT head w/o contrast 11/2/24:  \"No acute intracranial abnormality. Lacunar type infarcts in the right thalamus and right cerebellum are new since prior exam but appear chronic on CT. These were described on the outside head CT dated 5/23/2023.\"    Plan:  - MRI brain ordered, pending  - Continue aspirin 81 mg daily  - BP goal normotension, avoid hypotension  - Increase " Carbamazepine to 400 mg [id number]. Check CBC/CMP in the morning.   - Seizure precautions while admitted  - No indication for EEG at this time with known history of epilepsy and pt at his baseline. If there is suspicion for recurrent seizure while admitted, would consider transfer for vEEG.   - Will need to be reported to [facility]. He does not drive but does hold a license.  - Seizure precautions outpatient: no driving, no operating heavy machinery, no swimming alone, no climbing, no baths only showers, no cooking alone, or any activity that would put them at increased risk of harm if they were to have a seizure.  - Continue statin ()  - Metabolic/infectious workup per primary  - Continue to monitor for changes in examination  - Rest of care per primary    Recommendations for outpatient neurological follow up have yet to be determined. Will eventually need outpatient follow up with his neurologist at [facility] after discharge.     History of Present Illness   Jon Morton is a 74 y.o. man with PMHx including HTN, epilepsy, neuropathy who presented to the evaluation for evaluation s/p seizure. Per report, pt was seen by a neighbor sitting outside, witnessed to have generalized shaking lasting about 2 minutes with post-ictal state and apparent left-sided weakness after the seizure. Weakness significantly improved by the time pt came to the ED, although continued to be confused with slight left-sided weakness. Pt returned to baseline by the time he was admitted. This morning on evaluation, he is awake and alert, answers questions and follows commands. He states that he does recall sitting outside and feeling fine, then suddenly losing awareness, next remembering being in the hospital. He does have a seizure history but was unclear about the details of the semiology. He takes Carbamazepine twice daily but was unsure of the dose, stating that someone comes to his home to set up his pill box. He denies having any  symptoms other than chronic numbness/tingling in the hands and feet. No other noted issues or concerns. Rare alcohol use. No smoking recently, quit years ago. Denies drug use history.     Last seen by [facility] neurology in April 2023. Last seizure was in 2021 per documentation at that time. Was reported to be taking Carbamazepine 300 mg [id number]. Was noted to have had periods of staring and unresponsiveness, repetitive motions of the hands/mouth associated with altered awareness or full body shaking. Was recommended to continue Carbamazepine 300 mg [id number] and obtain repeat blood work. On refill encounter from June of this year, was recommended to take 200 mg tablets: 1.5 tablets in the morning and 2 tablets at night, although unclear why dose was changed.     Review of Systems   See HPI    Objective :  Temp:  [98.5 °F (36.9 °C)-99.5 °F (37.5 °C)] 98.5 °F (36.9 °C)  HR:  [] 101  BP: (109-168)/(75-97) 109/80  Resp:  [16-18] 16  SpO2:  [93 %-98 %] 95 %  O2 Device: None (Room air)    Physical Exam  Gen: no acute distress   CV: s1 and s2 present, RRR  Pulm: clear to auscultation bilaterally  Abd: soft, non-tender, non-distended, bowel sounds present  Ext: no edema    Mental status: Awake, alert and oriented to person, place and year. Attention/concentration intact. Naming, repetition, comprehension intact. No dysarthria, no aphasia.   Cranial nerves: pupils equally round and reactive to light, visual fields full, no nystagmus, extraocular muscles intact, V1 through V3 intact bilaterally and symmetric, face symmetric with smile, hearing intact to finger rub, palate elevation symmetric, tongue was midline,   Motor: Normal bulk and tone, no drift, strength 5/5 in bilateral upper and lower extremities.  strength 5/5.   Sensation: Intact to light touch. No neglect.   Coordination: No dysmetria on finger-to-nose and heel-to-shin.  Reflexes: 1+ in upper and lower extremities  Gait: deferred      Lab Results: I have  reviewed the following results:CBC:   Results from last 7 days   Lab Units 11/02/24  1721   WBC Thousand/uL 7.23   RBC Million/uL 4.30   HEMOGLOBIN g/dL 13.6   HEMATOCRIT % 40.9   MCV fL 95   PLATELETS Thousands/uL 177   , BMP/CMP:   Results from last 7 days   Lab Units 11/03/24  0452 11/02/24  1721   SODIUM mmol/L 132* 131*   POTASSIUM mmol/L 4.1 4.3   CHLORIDE mmol/L 100 100   CO2 mmol/L 23 19*   BUN mg/dL 16 20   CREATININE mg/dL 1.10 1.24   CALCIUM mg/dL 8.5 8.7   AST U/L 20  --    ALT U/L 11  --    ALK PHOS U/L 91  --    EGFR ml/min/1.73sq m 65 56   , HgBA1C:   , TSH:   Results from last 7 days   Lab Units 11/02/24  1721   TSH 3RD GENERATON uIU/mL 5.029*   , Coagulation:   Results from last 7 days   Lab Units 11/02/24  1948   INR  0.98   , Lipid Profile:   Results from last 7 days   Lab Units 11/02/24  1721   HDL mg/dL 67   LDL CALC mg/dL 100   TRIGLYCERIDES mg/dL 58     Recent Labs     11/02/24  1721 11/03/24  0452   WBC 7.23  --    HGB 13.6  --    HCT 40.9  --      --    SODIUM 131* 132*   K 4.3 4.1    100   CO2 19* 23   BUN 20 16   CREATININE 1.24 1.10   GLUC 135 95     Imaging Results Review: I reviewed radiology reports from this admission including: CT head. Reviewed imaging in PACS.

## 2024-11-03 NOTE — ASSESSMENT & PLAN NOTE
Patient follows with [facility] neurology last appointment 4/13/2023.  Should be taking carBAMazepine (TEGretol) 200 mg tablet .  Patient lost to follow-up. Suspect poor compliance with medication. Hx suggests absence seizures but unlikely due to subjective family stating onset was 4 to 5 years ago.     No evidence of fall, tongue biting or loss of bladder function on exam.    Consider Seizure causes although less likely, Not hypoglycemic, hyponatremia on CMP is chronic compared to prior labs, no evidence of trauma on exam or on CT scan, no evidence of infection    Suspect stroke symptoms above are due to todds paralysis in setting of breakthrough seizure versus medication non-compliance.     Plan   - Carbamazepine level normal, Neuro rec 400mg BID. Unsure of prior medication compliance in setting of dementia.  - seizure precautions  - TSH 5.0

## 2024-11-03 NOTE — PHYSICAL THERAPY NOTE
PHYSICAL THERAPY EVALUATION NOTE    Patient Name: Jon Morton  Today's Date: 11/3/2024  AGE:   74 y.o.  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  Length Of Stay: 0  PHYSICAL THERAPY EVALUATION :    11/03/24 0756   PT Last Visit   PT Visit Date 11/03/24   Pain Assessment   Pain Assessment Tool 0-10   Pain Score No Pain   Restrictions/Precautions   Other Precautions Cognitive;Chair Alarm;Bed Alarm;Fall Risk;Telemetry;Hard of hearing;Seizure  ([place])   Home Living   Type of Home Apartment   Home Layout One level;Other (Comment)  (no KALLIE)   Additional Comments lives alone. ambulates w/o device. no DME. independent w/ ADLs and IADLs (except that friends drive). no falls in last 6 months.   General   Additional Pertinent History 11/2/24 at 21:53 blood pressure was 155/97   Family/Caregiver Present No   Cognition   Arousal/Participation Cooperative   Orientation Level Oriented to person;Oriented to place;Oriented to time;Other (Comment)  (pt was identified w/ full name, birth date. pt needed input for task focus and had some difficulty w/ processing mutlistep tasks.)   Following Commands Follows one step commands with increased time or repetition   Comments room air resting pulse ox 98% and 96 BPM, active 94% and 112 BPM.   Subjective   Subjective pt seen supine in bed. agreed to PT eval.  denied pain or dizziness. pt needed occasional redirection for appropriate level of participation.   RUE Assessment   RUE Assessment WFL   LUE Assessment   LUE Assessment WFL   RLE Assessment   RLE Assessment WFL  (4- to 4/5)   LLE Assessment   LLE Assessment WFL  (4- to 4/5)    Vision-Basic Assessment   Current Vision Does not wear glasses   Light Touch   RLE Light Touch Grossly intact   LLE Light Touch Grossly intact   Bed Mobility   Supine to Sit 5  Supervision   Additional items HOB elevated;Increased time required   Transfers   Sit to Stand 5  Supervision   Additional items Increased time required   Stand to Sit 5  Supervision   Additional items Increased time required;Verbal cues  (for body positioning)   Toilet transfer 5  Supervision   Additional items Standard toilet;Increased time required;Verbal cues  (for body positioning, management of pants/gown, task focus/safety)   Ambulation/Elevation   Gait pattern Forward Flexion;Narrow FRANCESCO;Short stride;Inconsistent opol   Gait Assistance 5  Supervision  (to minx1 w/o device. supervision w/ single point cane.)   Additional items Assist x 1;Verbal cues;Tactile cues  (for posture, full step length, finding way back to room, task focus/safety)   Assistive Device None;SPC   Distance w/o device: 20 feet. pt toileted. 60 feet.  (w/ single point cane: 180 feet. additional ambulation was not possible due to fatigue.)   Ambulation/Elevation Additional Comments Comfortable Gait Speed w/ SPC: 0.53 m/s   Balance   Static Sitting Good   Dynamic Sitting Fair   Static Standing Fair +  (w/ cane)   Ambulatory Fair -  (w/ cane)   Activity Tolerance   Activity Tolerance Patient limited by fatigue   Nurse Made Aware spoke to Wendy Che OT   Assessment   Problem List Decreased strength;Decreased endurance;Impaired balance;Decreased mobility;Decreased safety awareness   Assessment Pt presents with altered mental status, weakness and unresponsiveness. Dx: lacunar type infarcts of right thalamus and right cerebellum, seizure, HTN, and dementia. order placed for PT eval and tx, w/ activity order of up and out of bed as tolerated and seizure precautions. pt presents w/ comorbidities of HTN, epilepsy, CAD, bladder cancer, neuropathy, TIA, rotator cuff  surgery, back surgery, cataract extraction, and MI and personal factors of limited home support and hearing impairments. pt presents w/ weakness, decreased endurance, impaired cognition, impaired balance, gait deviations, impaired hearing, decreased safety awareness, and fall risk. these impairments are evident in findings from physical examination (weakness), mobility assessment (need for standby to min assist w/ all phases of mobility when usually mobilizing independently, tolerance to only 180 feet of ambulation, and need for cueing for mobility technique), and Barthel Index: 65/100 and Comfortable Gait Speed: 0.53 m/s (less than 1.0 m/s indicates need for intervention to address falls risk). pt needed input for task focus and mobility technique/safety. pt is at risk for falls due to physical and safety awareness deficits. pt's clinical presentation is unstable/unpredictable (evident in poor blood pressure control, need for standby to min assist w/ all phases of mobility when usually mobilizing independently, tolerance to only 180 feet of ambulation, and need for input for task focus and mobility technique). pt needs inpatient PT tx to improve mobility deficits and progress mobility training as appropriate. Pt was noted to have improved ambulation tolerance and decreased level of assist when ambulating w/ single point cane. Uncertain of level of supervision that pt requires at home due to cognition and safety awareness. OT consult would benefit pt to address this issue and overall cognition.   Goals   Patient Goals I want to go home   STG Expiration Date 11/17/24   Short Term Goal #1 pt will: Increase bilateral LE strength 1/2 grade to facilitate independent mobility, Perform bed mobility independently to increase level of independence, Perform all transfers independently to improve independence, Ambulate 400 ft. with least restrictive assistive device independently w/o LOB to improve functional independence,  Increase ambulatory balance 1 grade to decrease risk for falls, Tolerate 3 hr OOB to faciliate upright tolerance, Improve gait speed to 0.63 m/s to reduce fall risk and increase independence, and Improve Barthel Index score to 85 or greater to facilitate independence   PT Treatment Day 0   Discharge Recommendation   Rehab Resource Intensity Level, PT III (Minimum Resource Intensity)   Additional Comments (S)  OT consult would benefit pt to address cognition and required level of supervision that pt requires at home.   AM-PAC Basic Mobility Inpatient   Turning in Flat Bed Without Bedrails 4   Lying on Back to Sitting on Edge of Flat Bed Without Bedrails 3   Moving Bed to Chair 3   Standing Up From Chair Using Arms 3   Walk in Room 3   Climb 3-5 Stairs With Railing 2   Basic Mobility Inpatient Raw Score 18   Basic Mobility Standardized Score 41.05   [facility] Highest Level Of Mobility   -HLM Goal 6: Walk 10 steps or more   -HLM Achieved 8: Walk 250 feet ot more   Barthel Index   Feeding 10   Bathing 0   Grooming Score 5   Dressing Score 10   Bladder Score 5   Bowels Score 10   Toilet Use Score 5   Transfers (Bed/Chair) Score 10   Mobility (Level Surface) Score 10   Stairs Score 0   Barthel Index Score 65   End of Consult   Patient Position at End of Consult Bedside chair;Bed/Chair alarm activated;All needs within reach     The patient's AM-PAC Basic Mobility Inpatient Short Form Raw Score is 18. A Raw score of greater than 16 suggests the patient may benefit from discharge to home. Please also refer to the recommendation of the Physical Therapist for safe discharge planning.    Skilled PT recommended while in hospital and upon DC to progress pt toward treatment goals.     Benton Guevara, PT

## 2024-11-03 NOTE — OCCUPATIONAL THERAPY NOTE
"    Occupational Therapy Evaluation + Cognitive Evaluation (ACLS)     Patient Name: Jon Morton  Today's Date: 11/3/2024  Problem List  Active Problems:    Stroke (HCC)    Seizure (HCC)    HTN (hypertension)    Dementia (HCC)    Past Medical History  No past medical history on file.  Past Surgical History  No past surgical history on file.          11/03/24 0918   OT Last Visit   OT Visit Date 11/03/24   Note Type   Note type Evaluation  (+ Cognitive Evaluation)   Pain Assessment   Pain Assessment Tool 0-10   Pain Score No Pain   Restrictions/Precautions   Weight Bearing Precautions Per Order No   Other Precautions Cognitive;Chair Alarm;Bed Alarm;Multiple lines;Fall Risk;Pain   Home Living   Type of Home Apartment   Home Layout One level  (no KALLIE)   Bathroom Shower/Tub Walk-in shower   Bathroom Toilet Standard   Bathroom Equipment Grab bars in shower   Bathroom Accessibility Accessible   Home Equipment   (none)   Additional Comments no use of AD at baseline   Prior Function   Level of Hope Independent with ADLs   Lives With (S)  Alone   Receives Help From Neighbor   IADLs Family/Friend/Other provides transportation;Independent with meal prep;Family/Friend/Other provides medication management  (reports that friends drive and friends organize medications)   Falls in the last 6 months 0   Vocational Retired   Lifestyle   Autonomy PTA pt living alone in apartment, pt (I) with ADLs and IADLs, (-)falls, (-)drives, no use of AD at baseline   Reciprocal Relationships supportive neighbors check in on pt daily   Service to Others retired   Intrinsic Gratification enjoys cleaning his apartment   General   Additional Pertinent History Admit on stroke pathway s/p witnessed seizure. CT head:  Lacunar type infarcts in the right thalamus and right cerebellum are new since prior exam but appear chronic on CT. PMH: seizure hx, HTN, dementia   Family/Caregiver Present No   Subjective   Subjective \"I don't really know " "what the year is, thats a tricky question\"   ADL   Eating Assistance 5  Supervision/Setup   Grooming Assistance 4  Minimal Assistance   Grooming Deficit Increased time to complete;Supervision/safety;Verbal cueing;Teeth care  (requiring cuing for sequencing of brushing teeth, forgetful to rinse mouth out s/p brushing.)   UB Bathing Assistance 5  Supervision/Setup   LB Bathing Assistance 5  Supervision/Setup   UB Dressing Assistance 5  Supervision/Setup   LB Dressing Assistance 4  Minimal Assistance   LB Dressing Deficit Increased time to complete;Supervision/safety;Verbal cueing;Don/doff L sock;Don/doff R sock   Toileting Assistance  4  Minimal Assistance   Bed Mobility   Supine to Sit 5  Supervision   Additional items Increased time required;Verbal cues   Transfers   Sit to Stand 5  Supervision   Additional items Increased time required;Verbal cues   Stand to Sit 5  Supervision   Additional items Increased time required;Verbal cues   Additional Comments no use of AD   Functional Mobility   Functional Mobility 5  Supervision   Additional Comments functional household distance, trial with SPC - however pt carrying cane along with himn - no use of SPC for support   Balance   Static Sitting Good   Dynamic Sitting Fair +   Static Standing Fair   Dynamic Standing Fair -   Ambulatory Fair -   Activity Tolerance   Activity Tolerance Other (Comment)  (limited by cognition)   Medical Staff Made Aware VICKIE SCOTT Assessment   RUE Assessment WFL   LUE Assessment   LUE Assessment WFL   Hand Function   Gross Motor Coordination Functional   Fine Motor Coordination Functional   Cognition   Overall Cognitive Status (S)  Impaired   Arousal/Participation Alert;Cooperative   Attention Attends with cues to redirect   Orientation Level Oriented to person;Oriented to place;Disoriented to time;Disoriented to situation  (grossly oriented to place- knew that he was at [facility] - continues to say that he was born here. No recall of year: " states 2014)   Memory Decreased short term memory;Decreased recall of recent events;Decreased recall of precautions   Following Commands Follows one step commands with increased time or repetition   Comments (S)  POOR insight into deficits and overall problem solving. Pt reporting that his memory and cognition feel normal - no acute changes. Pt requiring sequencing through ADL tasks.   Cognition Assessment Tools (S)  ACLS  (see below)   Assessment   Limitation Decreased ADL status;Decreased Safe judgement during ADL;Decreased cognition;Decreased endurance;Decreased self-care trans;Decreased high-level ADLs  (impaired balance, fxnl mobility, act bryan, fxnl reach, standing bryan, strength, attention to task, direction following, safety awareness, insight, pacing, problem solving, learning new tasks, response time)   Prognosis Good   Assessment Pt is a 74 y.o. male seen for OT evaluation s/p admission to [facility] on 11/2/2024 due to seizure. Personal and env factors supporting pt at time of IE include (I) PLOF, supportive neighbors, accessible home environment, and FFSU. Personal and env factors inhibiting engagement in occupations include advanced age, lifestyle patterns, and limited social support. Performance deficits that affect the pt’s occupational performance can be seen above. Due to pt's current functional limitations and medical complications pt is functioning below baseline. Pt would benefit from continued skilled OT treatment in order to maximize safety, independence and overall performance with ADLs, functional mobility, functional transfers, and cognition in order to achieve highest level of function.   Goals   Patient Goals to go home   LTG Time Frame 10-14   Long Term Goal see goals listed below   Plan   Treatment Interventions ADL retraining;Functional transfer training;Endurance training;Cognitive reorientation;Patient/family training;Equipment evaluation/education;Compensatory technique education;Energy  "conservation;Activityengagement   Goal Expiration Date 11/13/24   OT Treatment Day 1   OT Frequency 2-3x/wk   Discharge Recommendation   Rehab Resource Intensity Level, OT (S)  III (Minimum Resource Intensity)  (Recommend d/c to environment with 24/7 care as per ACLS recommendations + OP cognitive therapy)   AM-PAC Daily Activity Inpatient   Lower Body Dressing 3   Bathing 3   Toileting 3   Upper Body Dressing 3   Grooming 3   Eating 3   Daily Activity Raw Score 18   Daily Activity Standardized Score (Calc for Raw Score >=11) 38.66   AM-PAC Applied Cognition Inpatient   Following a Speech/Presentation 3   Understanding Ordinary Conversation 3   Remembering Where Things Are Placed or Put Away 2   Remembering List of 4-5 Errands 1   Taking Care of Complicated Tasks 1   Vance Cognitive Level   Vance Cognitive Level Score (S)  4.0   Vance Cognitive Score Recommendation (S)  24 hour supervision   Vance Cognitive Level Comments Pt participating in ACLS - use of standard ACLS for running stitch. Pt requiring x2 demonstrations for running stitch - unsure how many he completed. During whipstitch pt reverting back to running stitch on 1st trial and asking therapist \"why did you put this here? - you make a mistake right in the middle of it\" Pt unaware that he had made the error and continuing to insist that he had made the sample stitches. With 2nd demonstration pt able to complete 1 correct whipstitch followed by an error - unable to correct error and ripping lace in half. Transition to use of LACLS due to broken lace. Pt able to complete x2 whipstitches in LACLS - able to identify and correct x1 cross in back error, spontaneously correcting twisted lace. Pt demonstrating impairments in attention, direction following, insight, problem solving, safety awareness. Pt's performance may be impacted by potential post-ictal period - recommend continued follow up with cognitive evaluation. As per protocol can re-assess with ACLS s/p " 2 weeks. In the meantime recommendations regarding safety are listed below. Pt educated on score and implications and declines all recommendations. Would highly benefit from continued evaluation and follow up with geriatrics + OP cognitive therapy.      4.0    Administered Vance Cognitive Level Screen (ACLS).  Pt scored 4.0/6.0 indicating 24 hour supervision is recommended to remove dangerous objects and solve problems due to minor changes in routine activities.    Behavior:  May be disoriented to day/date.  Memorization of new tasks will be extremely slow.  Long term repetitive training is required for all new tasks.  Allow 2-3x average rate for completion of tasks. Recognizes only 1 way to complete a task. Asks for A but does not identify true problems.  Grooming:  Initiates and completes a familiar sequence of actions when all necessary tools area available and within reach.  May not initiate at the correct time of day consistently.  May miss sides of head when comping or shaving.  Expect cuts if using straight razor.  Restrict access to dangerous objects.  Check every few minutes if left alone.  Dressing:  Initiates dressing at customary time of day.  May select cloting but does not consider temperature, season or occasion.  Dons items slowly and correctly but cannot alter rate of dressing.  May have difficulty with fasteners.  May fail to notice errors out of visual field (shirt not tucked in in back).  Bathing:  Recognizes need for a bath but may initiate at inappropriate times.  Moves slowly through familiar sequence when items are available.  May not ask for help with a problem.  May not notice wet floors.  Watch for floods/falls.  Walking/Exercising:  May ask to be led to a new environment or may resist going to unfamiliar places.  May not note changes in terrain or stairs and may trip.  May be confused with changes in landmarks.  May become anxious in high stimulus environments (airports, malls,  [place]ino).  Eating:  Initiates coming to the table at routine times.  Uses common utensils in customary fashion.  Does not notice crumbs or spills.  May be clumsy with utensils.  Unable to eat and talk at same time but is aware at others at the table.  May not check food temperature of remember restrictions.  Monitor compliance with dietary restrictions.  Toileting:  Initiate toileting activities.  May need supervision to check results of wiping.  May not adjust garments in the back.  May use excessive toilet paper.  May need to be escorted to public rest rooms.  May take much longer than average to complete tasks.  Medications:  Initiates taking familiar dose of medication at regular time of day.   Recognizes prescriptions and may recognize a problem, but will not seek assistance.  May not understand need or purpose of medications.  Changes in routine will confuse the individual and disrupt compliance.  Provide frequent supervision.  Use of adaptive devices:   May accept adaptive devices that use familiar actions and sequences.  May forget to use cane, walker.  Housekeeping:  Initiates a well learned housekeeping routine when supplies are accessible.  Asks for next step in unfamiliar tasks.  Check quality of cleaning results.  Food Preparation:  Does not plan for food.  May make unreasonable, unhealthy requests.  May not understand balanced diet.  May prepare a simple cold meal like a sandwich.  May cut or burn self when using hot or sharp equipment.  Restrict access or supervise all electrical or gas appliances.  Store undesirable equipment away from view.  Spending money:  Completes a simple monetary transaction for a small purchase without assistance.  Does not know price of most goods and services.    Shopping:  May walk a short distance to a familiar shop to purchase items.  Completes actions very slowly.  Generally does not anticipate cost, compare prices or adhere to a budget.  May not ask if items are not in  usual location.  Does not know prices of most goods and services.  May resist assistance with shopping.  May become highly anxious in shopping malls.  May be able to tolerate short trips only.  Laundry:  Initiates a request for clean clothing or places dirty clothes in a hamper. May not sort or measure soap.   May  not differentiate between dry clean, hand or machine wash.  Supervise use of washing machine.  Traveling:  May initiate traveling to familiar place by familiar means (bus). May become easily confused at environmental changes.  May resist going to unfamiliar locations or altering familiar means of transportation.  May not tolerate travel for more than 1 hour.  Telephone:  May be able to use a public telephone to call a familiar number.  If a problem occurs may not ask for help.  May call emergency or familiar numbers excessively.  May ignore time of day when calling.  Driving: Should not operate a motor vehicle.               Additional Treatment Session   Start Time 0900   End Time 0918   Treatment Assessment see ACLS section above for assessment   Additional Treatment Day 1   End of Consult   Patient Position at End of Consult Bedside chair;Bed/Chair alarm activated;All needs within reach        GOALS:      -Patient will perform grooming tasks standing at sink with overall Mod I in order to increase overall independence    -Patient will be Mod I with UB dressing using AE and AD as needed in order to increase (I) with ADLs    -Patient will be Mod I with UB bathing using AE and AD as needed in order to increase (I) with ADLs    -Patient will be Mod I with LB dressing with use of AE and AD as needed in order to increase (I) with ADLs    -Patient will be Mod I with LB bathing with use of AE and AD as needed in order to increase (I) with ADLs    -Patient will complete toileting w/ Mod I w/ G hygiene/thoroughness in order to reduce caregiver burden    -Patient will demonstrate Mod I with bed mobility for  ability to manage own comfort and initiate OOB tasks.     -Patient will perform functional transfers with Mod I to/from all surfaces using DME as needed in order to increase (I) with functional tasks    -Patient will be Mod I with functional mobility to/from bathroom for increased independence with toileting tasks    -Patient will engage in ongoing cognitive assessment in order to assist with safe discharge planning/recommendations.    -Patient will follow multi-step instructions with no VC in order to safely complete functional tasks           The patient's raw score on the -PAC Daily Activity Inpatient Short Form is 18. A raw score of less than 19 suggests the patient may benefit from discharge to post-acute rehabilitation services. HOWEVER please refer to the recommendation of the Occupational Therapist for safe discharge planning.      Wendy Kc MS, OTR/L

## 2024-11-03 NOTE — PLAN OF CARE
Problem: PHYSICAL THERAPY ADULT  Goal: Performs mobility at highest level of function for planned discharge setting.  See evaluation for individualized goals.  Description:            See flowsheet documentation for full assessment, interventions and recommendations.  Note:    Problem List: Decreased strength, Decreased endurance, Impaired balance, Decreased mobility, Decreased safety awareness  Assessment: Pt presents with altered mental status, weakness and unresponsiveness. Dx: lacunar type infarcts of right thalamus and right cerebellum, seizure, HTN, and dementia. order placed for PT eval and tx, w/ activity order of up and out of bed as tolerated and seizure precautions. pt presents w/ comorbidities of HTN, epilepsy, CAD, bladder cancer, neuropathy, TIA, rotator cuff surgery, back surgery, cataract extraction, and MI and personal factors of limited home support and hearing impairments. pt presents w/ weakness, decreased endurance, impaired cognition, impaired balance, gait deviations, impaired hearing, decreased safety awareness, and fall risk. these impairments are evident in findings from physical examination (weakness), mobility assessment (need for standby to min assist w/ all phases of mobility when usually mobilizing independently, tolerance to only 180 feet of ambulation, and need for cueing for mobility technique), and Barthel Index: 65/100 and Comfortable Gait Speed: 0.53 m/s (less than 1.0 m/s indicates need for intervention to address falls risk). pt needed input for task focus and mobility technique/safety. pt is at risk for falls due to physical and safety awareness deficits. pt's clinical presentation is unstable/unpredictable (evident in poor blood pressure control, need for standby to min assist w/ all phases of mobility when usually mobilizing independently, tolerance to only 180 feet of ambulation, and need for input for task focus and mobility technique). pt needs inpatient PT tx to  improve mobility deficits and progress mobility training as appropriate. Pt was noted to have improved ambulation tolerance and decreased level of assist when ambulating w/ single point cane. Uncertain of level of supervision that pt requires at home due to cognition and safety awareness. OT consult would benefit pt to address this issue and overall cognition.        Rehab Resource Intensity Level, PT: III (Minimum Resource Intensity)    See flowsheet documentation for full assessment.

## 2024-11-03 NOTE — ASSESSMENT & PLAN NOTE
"Jon Morton is a 74 year old man with PMHx including HTN, epilepsy, neuropathy who presented to the evaluation for evaluation s/p seizure. Witnessed to have a GTC prior to arrival with post-ictal confusion and paralysis of the LUE/LLE, which both quickly improved by the time he arrived to the ED for evaluation, although still had some residual weakness and confusion. This all resolved by the time he was admitted in the evening yesterday. He does report a history of seizures although was unclear on the details. He takes Carbamazepine at home but was unsure of dosing. Primary team also attempted to ask family but they were unsure as well. Per most recent medication refill note by his neurology group at [facility] in June 2024, he was recommended for 300 mg in the morning and 400 mg in the evening. No clear provoking factor for seizure. Overall, suspect breakthrough seizure with post-ictal state including Kody's paralysis. Lower suspicion for stroke/TIA although he does have risk factors for stroke and agree with MRI brain for further evaluation. He is at his baseline at this time.     CT head w/o contrast 11/2/24:  \"No acute intracranial abnormality. Lacunar type infarcts in the right thalamus and right cerebellum are new since prior exam but appear chronic on CT. These were described on the outside head CT dated 5/23/2023.\"    Plan:  - MRI brain ordered, pending  - Continue aspirin 81 mg daily  - BP goal normotension, avoid hypotension  - Increase Carbamazepine to 400 mg [id number]. Check CBC/CMP in the morning.   - Seizure precautions while admitted  - No indication for EEG at this time with known history of epilepsy and pt at his baseline. If there is suspicion for recurrent seizure while admitted, would consider transfer for vEEG.   - Will need to be reported to [facility]. He does not drive but does hold a license.  - Seizure precautions outpatient: no driving, no operating heavy machinery, no swimming alone, no " climbing, no baths only showers, no cooking alone, or any activity that would put them at increased risk of harm if they were to have a seizure.  - Continue statin ()  - Metabolic/infectious workup per primary  - Continue to monitor for changes in examination  - Rest of care per primary

## 2024-11-03 NOTE — UTILIZATION REVIEW
NOTIFICATION OF INPATIENT ADMISSION   AUTHORIZATION REQUEST   SERVICING FACILITY:   [place] [address]  Tax ID: [id number]  NPI: [id number]   ATTENDING PROVIDER:  Attending Name and NPI#: Kunal Farris Md [[id number]]  Address: [address] [place] [address]  Phone: [phone]     ADMISSION INFORMATION:  Place of Service: Inpatient Acute [facility]  Place of Service Code: 21  Inpatient Admission Date/Time: N/A N/A  Discharge Date/Time: No discharge date for patient encounter.  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     PHYSICIAN ADVISORY SERVICES:  Medical Necessity Denial & Dlvt-if-Saml Review  Phone: [phone]  Fax: [phone]  Email: [email or web address]     DISCHARGE SUPPORT TEAM:  For Patients Discharge Needs & Updates  Phone: [phone] opt. 2 Fax: [phone]  Email: [email or web address]

## 2024-11-03 NOTE — ASSESSMENT & PLAN NOTE
Patient and family report that he has baseline dementia on its Aricept of unknown dosage.  No mini cog eval on file.     Aoax2 on evaluation.     Plan  Hold Aricept until dosage is confirmed  Pt/ot eval and case management to ensure safe discharge  OT evaluation is the patient has cognitive deficits that are unsafe for independent living. Recommends 24/7 care and OP Cognitive Therapy

## 2024-11-03 NOTE — ASSESSMENT & PLAN NOTE
Patient has not seen a physician in greater than a year in a half. Patient has history of HTN on unknown dose amlodipine and losartan.   Amlodipine 10mg BID is home dose as told by nursing.      Plan   -Will allow permissive hypertension for 48 hours in setting of acute on chronic stroke. Hold medications  -resume home hypertension regimen upon confirmation of dosage from family after acute stroke is ruled out or 48 hours.  Family will bring dosage in confirm  -Questionable medication compliance as patient has baseline dementia and lives alone and manages his own medications.

## 2024-11-03 NOTE — PROGRESS NOTES
Progress Note - Hospitalist   Name: Jon Morton 74 y.o. male I MRN: [id number]  Unit/Bed#: S -01 I Date of Admission: 11/2/2024   Date of Service: 11/3/2024 I Hospital Day: 0    Assessment & Plan  Stroke ([facility])  NIH score on evaluation 11/2: 0  CT head: Lacunar type infarcts in the right thalamus and right cerebellum are new since prior exam but appear chronic on CT. These were described on the outside head CT dated 5/23/2023.     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  With findings on CT scan we will place patient on stroke pathway    Plan  - neuro consult  - MRI carlin  - continue asprin 81mg home medication  - Echocardiogram with bubble study for 1400 (11/3)  - PT + ot eval  - Tele  - Neuro checks   - Speech and swallow eval   Seizure ([facility])  Patient follows with [facility] neurology last appointment 4/13/2023.  Should be taking carBAMazepine (TEGretol) 200 mg tablet .  Patient lost to follow-up. Suspect poor compliance with medication. Hx suggests absence seizures but unlikely due to subjective family stating onset was 4 to 5 years ago.     No evidence of fall, tongue biting or loss of bladder function on exam.    Consider Seizure causes although less likely, Not hypoglycemic, hyponatremia on CMP is chronic compared to prior labs, no evidence of trauma on exam or on CT scan, no evidence of infection    Suspect stroke symptoms above are due to todds paralysis in setting of breakthrough seizure versus medication non-compliance.     Plan   - Carbamazepine level normal, Neuro rec 400mg BID. Unsure of prior medication compliance in setting of dementia.  - seizure precautions  - TSH 5.0  HTN (hypertension)  Patient has not seen a physician in greater than a year in a half. Patient has history of HTN on unknown dose amlodipine and losartan.   Amlodipine 10mg BID is home dose as told by nursing.      Plan   -Will allow permissive hypertension for 48 hours in setting of  acute on chronic stroke. Hold medications  -resume home hypertension regimen upon confirmation of dosage from family after acute stroke is ruled out or 48 hours.  Family will bring dosage in confirm  -Questionable medication compliance as patient has baseline dementia and lives alone and manages his own medications.  Dementia (HCC)  Patient and family report that he has baseline dementia on its Aricept of unknown dosage.  No mini cog eval on file.     Aoax2 on evaluation.     Plan  Hold Aricept until dosage is confirmed  Pt/ot eval and case management to ensure safe discharge  OT evaluation is the patient has cognitive deficits that are unsafe for independent living. Recommends 24/7 care and OP Cognitive Therapy    VTE Pharmacologic Prophylaxis: VTE Score: 8 High Risk (Score >/= 5) - Pharmacological DVT Prophylaxis Ordered: enoxaparin (Lovenox). Sequential Compression Devices Ordered.    Mobility:   Basic Mobility Inpatient Raw Score: 18  JH-HLM Goal: 6: Walk 10 steps or more  JH-HLM Achieved: 7: Walk 25 feet or more  JH-HLM Goal achieved. Continue to encourage appropriate mobility.    Patient Centered Rounds: I performed bedside rounds with nursing staff today.   Discussions with Specialists or Other Care Team Provider: Neurology, Nursing, Care managment    Education and Discussions with Family / Patient: Updated  (niece) at bedside.    Current Length of Stay: 0 day(s)  Current Patient Status: Observation   Certification Statement: The patient will continue to require additional inpatient hospital stay due to workup of potential stroke/seizure and safe discharge in setting of dementia  Discharge Plan: Anticipate discharge in 24-48 hrs to discharge location to be determined pending rehab evaluations.    Code Status: Level 1 - Full Code    Subjective   Patient is evaluated bedside this morning.  He is doing well and is mainly asymptomatic.  He is ANO x 2 for me, he was not oriented to time.  He denies  any chest pain, shortness of breath, dizziness, any focal deficits.  He has no focal deficits on my exam, he is sitting up in the chair eating breakfast this morning, had just finished evaluation by PT and was up and walking.    Objective :  Temp:  [98.5 °F (36.9 °C)-99.5 °F (37.5 °C)] 98.5 °F (36.9 °C)  HR:  [] 101  BP: (109-168)/(75-97) 109/80  Resp:  [16-18] 16  SpO2:  [93 %-98 %] 95 %  O2 Device: None (Room air)    There is no height or weight on file to calculate BMI.     Input and Output Summary (last 24 hours):     Intake/Output Summary (Last 24 hours) at 11/3/2024 1341  Last data filed at 11/3/2024 1257  Gross per 24 hour   Intake 480 ml   Output --   Net 480 ml       Physical Exam  Vitals and nursing note reviewed.   Constitutional:       General: He is not in acute distress.     Appearance: He is well-developed.   HENT:      Head: Normocephalic and atraumatic.   Eyes:      Conjunctiva/sclera: Conjunctivae normal.   Cardiovascular:      Rate and Rhythm: Normal rate and regular rhythm.      Heart sounds: Murmur heard.      Comments: Systolic  Pulmonary:      Effort: Pulmonary effort is normal. No respiratory distress.      Breath sounds: Normal breath sounds.   Abdominal:      Palpations: Abdomen is soft.      Tenderness: There is no abdominal tenderness.   Musculoskeletal:         General: No swelling.      Cervical back: Neck supple.   Skin:     General: Skin is warm and dry.      Capillary Refill: Capillary refill takes less than 2 seconds.   Neurological:      Mental Status: He is alert.      Comments: Oriented x2   Psychiatric:         Mood and Affect: Mood normal.       Telemetry:  Telemetry Orders (From admission, onward)               24 Hour Telemetry Monitoring  (ED Bridging Orders Panel)  Continuous x 24 Hours (Telem)        Question:  Reason for 24 Hour Telemetry  Answer:  TIA/Suspected CVA/ Confirmed CVA                     Telemetry Reviewed: Normal Sinus Rhythm  Indication for Continued  Telemetry Use: Acute CVA               Lab Results: I have reviewed the following results:   Results from last 7 days   Lab Units 11/02/24  1721   WBC Thousand/uL 7.23   HEMOGLOBIN g/dL 13.6   HEMATOCRIT % 40.9   PLATELETS Thousands/uL 177     Results from last 7 days   Lab Units 11/03/24  0452   SODIUM mmol/L 132*   POTASSIUM mmol/L 4.1   CHLORIDE mmol/L 100   CO2 mmol/L 23   BUN mg/dL 16   CREATININE mg/dL 1.10   ANION GAP mmol/L 9   CALCIUM mg/dL 8.5   ALBUMIN g/dL 3.4*   TOTAL BILIRUBIN mg/dL 0.71   ALK PHOS U/L 91   ALT U/L 11   AST U/L 20   GLUCOSE RANDOM mg/dL 95     Results from last 7 days   Lab Units 11/02/24  1948   INR  0.98     Results from last 7 days   Lab Units 11/02/24  2153   POC GLUCOSE mg/dl 92     Results from last 7 days   Lab Units 11/03/24  0420   HEMOGLOBIN A1C % 5.3           Recent Cultures (last 7 days):         Imaging Results Review: I reviewed radiology reports from this admission including: CT head.      Last 24 Hours Medication List:     Current Facility-Administered Medications:     aspirin chewable tablet 81 mg, Daily    atorvastatin (LIPITOR) tablet 40 mg, QPM    carBAMazepine (TEGretol XR) 12 hr tablet 300 mg, BID    enoxaparin (LOVENOX) subcutaneous injection 40 mg, Daily    Administrative Statements   Today, Patient Was Seen By: Donnell Teague DO      **Please Note: This note may have been constructed using a voice recognition system.**

## 2024-11-03 NOTE — UTILIZATION REVIEW
Initial Clinical Review    Admission: Date/Time/Statement:   Admission Orders (From admission, onward)       Ordered        11/02/24 1824  Place in Observation  Once                          Orders Placed This Encounter   Procedures    Place in Observation     Standing Status:   Standing     Number of Occurrences:   1     Order Specific Question:   Level of Care     Answer:   Med Surg [16]     ED Arrival Information       Expected   -    Arrival   11/2/2024 17:03    Acuity   Immediate              Means of arrival   Ambulance    Escorted by   [facility]    Service   Family Medicine    Admission type   Emergency              Arrival complaint   -             Chief Complaint   Patient presents with    STROKE Alert       Initial Presentation: 74 y.o. male who presented by EMS to [facility] ED. Admitted as Observation for evaluation and treatment of breakthrough seizure. PMHx: seizure, HTN, dementia. Presented w/ AMS, weakness, unresponsiveness. EMS called stroke alert. Pt was found staring off into space and unresponsive in chair. Reported facial droop. Niece who is POA notes this is characteristic of his chronic seizures; reports pt confused but manages medications himself. NIHSS 0. Stroke alert discontinued in ED. EXAM: murmur. Na 131 (at baseline). CT shows chronic appearing lacunar infarcts. PLAN: MRI brain, continue ASA, load Plavix 300 mg then daily; echo, PT/OT evals, telemetry, neuro checks, check carbamazepine level & TSH, seizure precautions, permissive HTN for 48 hours. Neurology consulted.      11/03/24: Pt is oriented to self and place, not oriented to time. Exam: systolic murmur. Telemetry NSR. Plan: pending MRI brain, continue ASA, echo scheduled for this afternoon, telemetry, neuro checks, continue carbamazepine at increased dose per Neurology. OT notes that pt has cognitive deficits that are unsafe for independent living, recommend 24/7 care.     Neurology: Pt w/ seizures. Check MRI brain.  Continue ASA 81 mg daily. Discontinue Plavix. Increase carbamazepine to 400 mg q12h. Trend labs, replete electrolytes as needed. Seizure precautions. Continue statin.       Scheduled Medications:  aspirin, 81 mg, Oral, Daily  atorvastatin, 40 mg, Oral, QPM  carBAMazepine, 300 mg, Oral, BID  enoxaparin, 40 mg, Subcutaneous, Daily    Continuous IV Infusions: none    PRN Meds:   clopidogrel (PLAVIX) tablet 300 mg  Dose: 300 mg  Freq: Once Route: PO  Indications of Use: CEREBROVASCULAR ACCIDENT  Start: 11/02/24 2000 End: 11/02/24 2024      ED Triage Vitals   Temperature Pulse Respirations Blood Pressure SpO2 Pain Score   11/02/24 2022 11/02/24 1830 11/02/24 1830 11/02/24 1830 11/02/24 1830 11/02/24 2200   98.5 °F (36.9 °C) 85 18 168/93 98 % No Pain        Vital Signs (last 3 days)       Date/Time Temp Pulse Resp BP MAP (mmHg) SpO2 O2 Device Patient Position - Orthostatic VS [place] Coma Scale Score Pain    11/03/24 0918 -- -- -- -- -- -- -- -- -- No Pain    11/03/24 08:00:25 98.5 °F (36.9 °C) 101 16 109/80 90 95 % None (Room air) Sitting -- --    11/03/24 0756 -- -- -- -- -- -- -- -- -- No Pain    11/03/24 0600 -- -- -- -- -- -- -- -- 15 --    11/03/24 0400 -- -- -- -- -- -- -- -- 15 --    11/03/24 02:46:17 99.5 °F (37.5 °C) 90 18 114/75 88 95 % -- Lying -- --    11/03/24 0200 -- -- -- -- -- -- -- -- 15 --    11/03/24 0100 -- -- -- -- -- -- -- -- 15 --    11/03/24 00:16:50 99.5 °F (37.5 °C) 96 -- 118/81 93 93 % -- -- -- --    11/03/24 0000 -- -- -- -- -- -- -- -- 15 --    11/02/24 2300 -- -- -- -- -- -- -- -- 15 --    11/02/24 2200 -- -- -- -- -- 95 % None (Room air) -- 15 No Pain    11/02/24 21:53:13 98.6 °F (37 °C) 94 -- 155/97 116 94 % -- -- -- --    11/02/24 21:52:50 98.6 °F (37 °C) 95 -- 155/97 116 94 % -- -- -- --    11/02/24 20:22:19 98.5 °F (36.9 °C) 91 17 151/89 110 94 % -- -- -- --    11/02/24 2000 -- 91 -- 155/84 112 -- -- -- -- --    11/02/24 1830 -- 85 18 168/93 -- 98 % None (Room air) -- 15 --    11/02/24  1755 -- -- -- -- -- -- -- -- 15 --              Pertinent Labs/Diagnostic Test Results:   Radiology:  CT head wo contrast   Final Interpretation by Jose R Modi MD (11/02 1807)      No acute intracranial abnormality.   Lacunar type infarcts in the right thalamus and right cerebellum are new since prior exam but appear chronic on CT. These were described on the outside head CT dated 5/23/2023.               Workstation performed: [id number]         MRI Inpatient Order    (Results Pending)     Cardiology:  ECG 12 lead   Final Result by Graeme Richard MD (11/03 0905)   Normal sinus rhythm   Cannot rule out Anterior infarct , age undetermined   Abnormal ECG   When compared with ECG of 20-DEC-2007 03:16,   No significant change was found   Confirmed by Graeme Richard ([id number]) on 11/3/2024 9:05:52 AM            Results from last 7 days   Lab Units 11/02/24  1721   WBC Thousand/uL 7.23   HEMOGLOBIN g/dL 13.6   HEMATOCRIT % 40.9   PLATELETS Thousands/uL 177         Results from last 7 days   Lab Units 11/03/24  0452 11/02/24  1721   SODIUM mmol/L 132* 131*   POTASSIUM mmol/L 4.1 4.3   CHLORIDE mmol/L 100 100   CO2 mmol/L 23 19*   ANION GAP mmol/L 9 12   BUN mg/dL 16 20   CREATININE mg/dL 1.10 1.24   EGFR ml/min/1.73sq m 65 56   CALCIUM mg/dL 8.5 8.7     Results from last 7 days   Lab Units 11/03/24  0452   AST U/L 20   ALT U/L 11   ALK PHOS U/L 91   TOTAL PROTEIN g/dL 6.1*   ALBUMIN g/dL 3.4*   TOTAL BILIRUBIN mg/dL 0.71     Results from last 7 days   Lab Units 11/02/24  2153   POC GLUCOSE mg/dl 92     Results from last 7 days   Lab Units 11/03/24  0452 11/02/24  1721   GLUCOSE RANDOM mg/dL 95 135         Results from last 7 days   Lab Units 11/03/24  0420   HEMOGLOBIN A1C % 5.3   EAG mg/dl 105      Results from last 7 days   Lab Units 11/02/24  1948 11/02/24  1721   HS TNI 0HR ng/L  --  8   HS TNI 2HR ng/L 19  --    HSTNI D2 ng/L 11  --          Results from last 7 days   Lab Units 11/02/24 1948    PROTIME seconds 13.7   INR  0.98   PTT seconds 23     Results from last 7 days   Lab Units 11/02/24  1721   TSH 3RD GENERATON uIU/mL 5.029*       Admitting Diagnosis: Facial droop [R29.810]  Stroke (HCC) [I63.9]  Breakthrough seizure (HCC) [G40.919]  Altered mental status, unspecified altered mental status type [R41.82]  Chronic cerebrovascular accident (CVA) [Z86.73]  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 male    Network Utilization Review Department  ATTENTION: Please call with any questions or concerns to [phone] and carefully listen to the prompts so that you are directed to the right person. All voicemails are confidential.   For Discharge needs, contact Care Management DC Support Team at [phone] opt. 2  Send all requests for admission clinical reviews, approved or denied determinations and any other requests to dedicated fax number below belonging to the [facility] where the patient is receiving treatment. List of dedicated fax numbers for the Facilities:  FACILITY NAME UR FAX NUMBER   ADMISSION DENIALS (Administrative/Medical Necessity) [phone]   DISCHARGE SUPPORT TEAM (NETWORK) [phone]   PARENT CHILD HEALTH (Maternity/NICU/Pediatrics) [phone]   [facility] [phone]   [facility] [phone]   [facility] [phone]   [facility] [phone]   [facility] [phone]   [facility] [phone]   [facility] [phone]   [facility] [phone]   [facility] [phone]   [facility] [phone]   [facility] [phone]   [facility] [phone]

## 2024-11-03 NOTE — ASSESSMENT & PLAN NOTE
Patient follows with [facility] neurology last appointment 4/13/2023.  Should be taking carBAMazepine (TEGretol) 200 mg tablet .  Patient lost to follow-up. Suspect poor compliance with medication. Hx suggests absence seizures but unlikely due to subjective family stating onset was 4 to 5 years ago.     No evidence of fall, tongue biting or loss of bladder function on exam.    Consider Seizure causes although less likely, Not hypoglycemic, hyponatremia on CMP is chronic compared to prior labs, no evidence of trauma on exam or on CT scan, no evidence of infection    Suspect stroke symptoms above are due to todds paralysis in setting of breakthrough seizure versus medication non-complience.     Plan   -Check carbamazepine level  -Neurology consult  -seizure precautions  -check TSH

## 2024-11-03 NOTE — ASSESSMENT & PLAN NOTE
Patient and family report that he has baseline dementia on its Aricept of unknown dosage.  No mini cog eval on file.     Aoax3 on evaluation.     Plan  Hold Aricept until dosage is confirmed  Consider geriatrics eval prior to dc   Pt/ot eval and case management to ensure safe discharge

## 2024-11-03 NOTE — QUICK NOTE
Neurology reached out to me via epic secure chat.  Recommended to discontinue Plavix load and just continue daily aspirin for now.    They also recommended continuing carbamazepine at this time.  300 mg twice daily ordered.  Carbamazepine level pending.    Okay with patient remaining on stroke pathway until seen and evaluated in the morning by neurology team.    Jose Cabral, DO  PGY-2 Family Medicine

## 2024-11-03 NOTE — PLAN OF CARE
Problem: OCCUPATIONAL THERAPY ADULT  Goal: Performs self-care activities at highest level of function for planned discharge setting.  See evaluation for individualized goals.  Description: Treatment Interventions: ADL retraining, Functional transfer training, Endurance training, Cognitive reorientation, Patient/family training, Equipment evaluation/education, Compensatory technique education, Energy conservation, Activityengagement          See flowsheet documentation for full assessment, interventions and recommendations.   Note: Limitation: Decreased ADL status, Decreased Safe judgement during ADL, Decreased cognition, Decreased endurance, Decreased self-care trans, Decreased high-level ADLs (impaired balance, fxnl mobility, act bryan, fxnl reach, standing bryan, strength, attention to task, direction following, safety awareness, insight, pacing, problem solving, learning new tasks, response time)  Prognosis: Good  Assessment: Pt is a 74 y.o. male seen for OT evaluation s/p admission to [facility] on 11/2/2024 due to seizure. Personal and env factors supporting pt at time of IE include (I) PLOF, supportive neighbors, accessible home environment, and FFSU. Personal and env factors inhibiting engagement in occupations include advanced age, lifestyle patterns, and limited social support. Performance deficits that affect the pt’s occupational performance can be seen above. Due to pt's current functional limitations and medical complications pt is functioning below baseline. Pt would benefit from continued skilled OT treatment in order to maximize safety, independence and overall performance with ADLs, functional mobility, functional transfers, and cognition in order to achieve highest level of function.     Rehab Resource Intensity Level, OT: (S) III (Minimum Resource Intensity) (Recommend d/c to environment with 24/7 care as per ACLS recommendations + OP cognitive therapy)

## 2024-11-03 NOTE — SPEECH THERAPY NOTE
Speech Language/Pathology    Speech-Language Pathology Bedside Swallow Evaluation      Patient Name: Jon Morton    Today's Date: 11/3/2024     Problem List  Active Problems:    Stroke (HCC)    Seizure (HCC)    HTN (hypertension)    Dementia (HCC)      Past Medical History  No past medical history on file.    Past Surgical History  No past surgical history on file.    Summary   Pt presented with functional appearing oral and pharyngeal stage swallowing skills with materials administered today. Bite strength is adequate. Timely and effective mastication and cohesive bolus formation. No significant oral residue. Swallows suspected fairly prompt. No overt s/s aspiration. Pt denies dysphagia, odynophagia, and/or s/s aspiration, although appreciate cognitive status/impairment.    Risk/s for Aspiration: Low risk     Recommended Diet: regular diet and thin liquids   Recommended Form of Meds:  as tolerated    Aspiration precautions and swallowing strategies: upright posture, only feed when fully alert, and slow rate of feeding  Other Recommendations: Continue frequent oral care         Current Medical Status  Pt is a 74 y.o. male who presented to [facility] with  a PMH of HTN, epilepsy,  who presents with altered mental status, weakness and unresponsiveness. Stroke alert was called by ems.  Per report patient was found staring off into space and unresponsive sitting in his chair on the porch by neighbors nearby.  Report mentioned that patient had facial droop.  Niece reports this is characteristic of his chronic seizures.  Stroke alert was discontinued in emergency room.  Upon evaluation in the emergency room patient was confused but no focal neurologic deficits or evidence of trauma.  Family denies any tongue biting or urination upon finding him at the house.     Niece notes that patient lives alone by himself and manages own medication although he has baseline dementia.  She also notes that he has not seen  a doctor in greater than 1 year.  She states that he is confused but has capacity to manage his own medications.  She states that he ambulates by himself.     In ED comprehensive metabolic panel was unremarkable except yielding a sodium of 131 which appears to be at baseline for his chronic hyponatremia.  CBC unremarkable.  CT head and neck showed no acute intracranial abnormality.  But there are lacunar infarcts of the right thalamus and right cell abdominal which are new from prior exam but appear chronic on CT. EKG NSR no new st elevation. Trops neg x1. .    Current Precautions: Seizure    Allergies:  No known food allergies    Past medical history:  Please see H&P for details    Special Studies:  CT head 11/2: No acute intracranial abnormality.  Lacunar type infarcts in the right thalamus and right cerebellum are new since prior exam but appear chronic on CT. These were described on the outside head CT dated 5/23/2023.    Social/Education/Vocational Hx:  Pt lives alone    Swallow Information   Current Risks for Dysphagia & Aspiration: AMS  Current Symptoms/Concerns:  stroke protocol  Current Diet: regular diet and thin liquids   Baseline Diet: regular diet and thin liquids      Baseline Assessment   Behavior/Cognition: alert and cognitive deficits noted by OT   Speech/Language Status: able to participate in conversation and able to follow commands  Patient Positioning: upright in bed  Pain Status/Interventions/Response to Interventions:  No report of or nonverbal indications of pain.       Swallow Mechanism Exam  Facial: symmetrical  Labial: WFL  Lingual: WFL  Velum: symmetrical  Mandible: adequate ROM  Dentition: edentulous  Vocal quality:clear/adequate   Volitional Cough: strong/productive   Respiratory Status: on RA       Consistencies Assessed and Performance   Consistencies Administered: thin liquids and regular textures (salad, grilled cheese with tomato)    Oral Stage: WFL  Mastication was adequate with  the materials administered today.  Bolus formation and transfer were functional with no significant oral residue noted.  No overt s/s reduced oral control.    Pharyngeal Stage: WFL  Swallow Mechanics:  Swallowing initiation appeared prompt.  Laryngeal rise was palpated and judged to be within functional limits.  No coughing, throat clearing, change in vocal quality or respiratory status noted today.     Esophageal Concerns: none reported    Strategies and Efficacy: -     Summary and Recommendations (see above)    Results Reviewed with: patient and RN     Treatment Recommended: Not at this time, evaluation only. Please re-consult if medically necessary.

## 2024-11-04 ENCOUNTER — APPOINTMENT (OUTPATIENT)
Dept: NON INVASIVE DIAGNOSTICS | Facility: HOSPITAL | Age: 74
DRG: 101 | End: 2024-11-04
Payer: COMMERCIAL

## 2024-11-04 ENCOUNTER — APPOINTMENT (OUTPATIENT)
Dept: VASCULAR ULTRASOUND | Facility: HOSPITAL | Age: 74
DRG: 101 | End: 2024-11-04
Payer: COMMERCIAL

## 2024-11-04 ENCOUNTER — HOME HEALTH ADMISSION (OUTPATIENT)
Dept: HOME HEALTH SERVICES | Facility: HOME HEALTHCARE | Age: 74
End: 2024-11-04
Payer: COMMERCIAL

## 2024-11-04 PROBLEM — E44.0 MODERATE PROTEIN-CALORIE MALNUTRITION (HCC): Status: ACTIVE | Noted: 2024-11-04

## 2024-11-04 PROBLEM — G62.9 NEUROPATHY: Status: ACTIVE | Noted: 2024-11-04

## 2024-11-04 LAB
ANION GAP SERPL CALCULATED.3IONS-SCNC: 6 MMOL/L (ref 4–13)
AORTIC ROOT: 3.3 CM
AORTIC VALVE MEAN VELOCITY: 44.2 M/S
APICAL FOUR CHAMBER EJECTION FRACTION: 68 %
AV AREA BY CONTINUOUS VTI: 0.4 CM2
AV AREA PEAK VELOCITY: 0.5 CM2
AV LVOT MEAN GRADIENT: 2 MMHG
AV LVOT PEAK GRADIENT: 4 MMHG
AV MEAN GRADIENT: 85 MMHG
AV PEAK GRADIENT: 127 MMHG
AV VALVE AREA: 0.42 CM2
AV VELOCITY RATIO: 0.18
BASOPHILS # BLD AUTO: 0.03 THOUSANDS/ΜL (ref 0–0.1)
BASOPHILS NFR BLD AUTO: 0 % (ref 0–1)
BSA FOR ECHO PROCEDURE: 2.32 M2
BUN SERPL-MCNC: 19 MG/DL (ref 5–25)
CALCIUM SERPL-MCNC: 8.9 MG/DL (ref 8.4–10.2)
CHLORIDE SERPL-SCNC: 104 MMOL/L (ref 96–108)
CO2 SERPL-SCNC: 26 MMOL/L (ref 21–32)
CREAT SERPL-MCNC: 1.18 MG/DL (ref 0.6–1.3)
DOP CALC AO PEAK VEL: 5.62 M/S
DOP CALC AO VTI: 110.99 CM
DOP CALC LVOT AREA: 3.14 CM2
DOP CALC LVOT CARDIAC INDEX: 1.55 L/MIN/M2
DOP CALC LVOT CARDIAC OUTPUT: 3.59 L/MIN
DOP CALC LVOT DIAMETER: 2 CM
DOP CALC LVOT PEAK VEL VTI: 14.99 CM
DOP CALC LVOT PEAK VEL: 1.03 M/S
DOP CALC LVOT STROKE INDEX: 17.7 ML/M2
DOP CALC LVOT STROKE VOLUME: 47.07 CM3
DOP CALC MV VTI: 36.63 CM
E WAVE DECELERATION TIME: 281 MS
E/A RATIO: 0.76
EOSINOPHIL # BLD AUTO: 0.02 THOUSAND/ΜL (ref 0–0.61)
EOSINOPHIL NFR BLD AUTO: 0 % (ref 0–6)
ERYTHROCYTE [DISTWIDTH] IN BLOOD BY AUTOMATED COUNT: 13.5 % (ref 11.6–15.1)
FOLATE SERPL-MCNC: 17.2 NG/ML
FRACTIONAL SHORTENING: 29 (ref 28–44)
GFR SERPL CREATININE-BSD FRML MDRD: 60 ML/MIN/1.73SQ M
GLUCOSE P FAST SERPL-MCNC: 98 MG/DL (ref 65–99)
GLUCOSE SERPL-MCNC: 122 MG/DL (ref 65–140)
GLUCOSE SERPL-MCNC: 98 MG/DL (ref 65–140)
HCT VFR BLD AUTO: 40.6 % (ref 36.5–49.3)
HGB BLD-MCNC: 13.7 G/DL (ref 12–17)
IMM GRANULOCYTES # BLD AUTO: 0.03 THOUSAND/UL (ref 0–0.2)
IMM GRANULOCYTES NFR BLD AUTO: 0 % (ref 0–2)
INTERVENTRICULAR SEPTUM IN DIASTOLE (PARASTERNAL SHORT AXIS VIEW): 1.3 CM
INTERVENTRICULAR SEPTUM: 1.3 CM (ref 0.6–1.1)
LAAS-AP2: 18.1 CM2
LAAS-AP4: 19.7 CM2
LEFT ATRIUM SIZE: 2.8 CM
LEFT ATRIUM VOLUME (MOD BIPLANE): 57 ML
LEFT ATRIUM VOLUME INDEX (MOD BIPLANE): 24.6 ML/M2
LEFT INTERNAL DIMENSION IN SYSTOLE: 3 CM (ref 2.1–4)
LEFT VENTRICULAR INTERNAL DIMENSION IN DIASTOLE: 4.2 CM (ref 3.5–6)
LEFT VENTRICULAR POSTERIOR WALL IN END DIASTOLE: 1.3 CM
LEFT VENTRICULAR STROKE VOLUME: 45 ML
LVSV (TEICH): 45 ML
LYMPHOCYTES # BLD AUTO: 0.54 THOUSANDS/ΜL (ref 0.6–4.47)
LYMPHOCYTES NFR BLD AUTO: 8 % (ref 14–44)
MCH RBC QN AUTO: 31.4 PG (ref 26.8–34.3)
MCHC RBC AUTO-ENTMCNC: 33.7 G/DL (ref 31.4–37.4)
MCV RBC AUTO: 93 FL (ref 82–98)
MONOCYTES # BLD AUTO: 0.34 THOUSAND/ΜL (ref 0.17–1.22)
MONOCYTES NFR BLD AUTO: 5 % (ref 4–12)
MV E'TISSUE VEL-LAT: 9 CM/S
MV E'TISSUE VEL-SEP: 6 CM/S
MV MEAN GRADIENT: 4 MMHG
MV PEAK A VEL: 1.44 M/S
MV PEAK E VEL: 110 CM/S
MV PEAK GRADIENT: 9 MMHG
MV STENOSIS PRESSURE HALF TIME: 82 MS
MV VALVE AREA BY CONTINUITY EQUATION: 1.28 CM2
MV VALVE AREA P 1/2 METHOD: 2.68
NEUTROPHILS # BLD AUTO: 5.97 THOUSANDS/ΜL (ref 1.85–7.62)
NEUTS SEG NFR BLD AUTO: 87 % (ref 43–75)
NRBC BLD AUTO-RTO: 0 /100 WBCS
PLATELET # BLD AUTO: 165 THOUSANDS/UL (ref 149–390)
PMV BLD AUTO: 9.1 FL (ref 8.9–12.7)
POTASSIUM SERPL-SCNC: 4.4 MMOL/L (ref 3.5–5.3)
RA PRESSURE ESTIMATED: 10 MMHG
RBC # BLD AUTO: 4.37 MILLION/UL (ref 3.88–5.62)
RIGHT ATRIAL 2D VOLUME: 39 ML
RIGHT ATRIUM AREA SYSTOLE A4C: 15.8 CM2
RIGHT VENTRICLE ID DIMENSION: 4.2 CM
SL CV LEFT ATRIUM LENGTH A2C: 4 CM
SL CV LV EF: 70
SL CV PED ECHO LEFT VENTRICLE DIASTOLIC VOLUME (MOD BIPLANE) 2D: 80 ML
SL CV PED ECHO LEFT VENTRICLE SYSTOLIC VOLUME (MOD BIPLANE) 2D: 36 ML
SODIUM SERPL-SCNC: 136 MMOL/L (ref 135–147)
TRICUSPID ANNULAR PLANE SYSTOLIC EXCURSION: 1.7 CM
VIT B12 SERPL-MCNC: 278 PG/ML (ref 180–914)
WBC # BLD AUTO: 6.93 THOUSAND/UL (ref 4.31–10.16)

## 2024-11-04 PROCEDURE — 82746 ASSAY OF FOLIC ACID SERUM: CPT

## 2024-11-04 PROCEDURE — 82607 VITAMIN B-12: CPT

## 2024-11-04 PROCEDURE — 80048 BASIC METABOLIC PNL TOTAL CA: CPT

## 2024-11-04 PROCEDURE — 97116 GAIT TRAINING THERAPY: CPT

## 2024-11-04 PROCEDURE — 97110 THERAPEUTIC EXERCISES: CPT

## 2024-11-04 PROCEDURE — 85025 COMPLETE CBC W/AUTO DIFF WBC: CPT

## 2024-11-04 PROCEDURE — 93880 EXTRACRANIAL BILAT STUDY: CPT | Performed by: SURGERY

## 2024-11-04 PROCEDURE — 82948 REAGENT STRIP/BLOOD GLUCOSE: CPT

## 2024-11-04 PROCEDURE — 99232 SBSQ HOSP IP/OBS MODERATE 35: CPT | Performed by: PSYCHIATRY & NEUROLOGY

## 2024-11-04 PROCEDURE — 99232 SBSQ HOSP IP/OBS MODERATE 35: CPT | Performed by: FAMILY MEDICINE

## 2024-11-04 PROCEDURE — 93306 TTE W/DOPPLER COMPLETE: CPT

## 2024-11-04 PROCEDURE — 93306 TTE W/DOPPLER COMPLETE: CPT | Performed by: INTERNAL MEDICINE

## 2024-11-04 PROCEDURE — 93880 EXTRACRANIAL BILAT STUDY: CPT

## 2024-11-04 RX ORDER — DONEPEZIL HYDROCHLORIDE 5 MG/1
10 TABLET, FILM COATED ORAL
Status: DISCONTINUED | OUTPATIENT
Start: 2024-11-04 | End: 2024-11-05 | Stop reason: HOSPADM

## 2024-11-04 RX ORDER — GABAPENTIN 300 MG/1
300 CAPSULE ORAL
Status: DISCONTINUED | OUTPATIENT
Start: 2024-11-04 | End: 2024-11-05 | Stop reason: HOSPADM

## 2024-11-04 RX ORDER — CARBAMAZEPINE 100 MG/1
400 TABLET, EXTENDED RELEASE ORAL 2 TIMES DAILY
Status: DISCONTINUED | OUTPATIENT
Start: 2024-11-04 | End: 2024-11-05 | Stop reason: HOSPADM

## 2024-11-04 RX ORDER — METOPROLOL SUCCINATE 50 MG/1
50 TABLET, EXTENDED RELEASE ORAL 2 TIMES DAILY
Status: DISCONTINUED | OUTPATIENT
Start: 2024-11-04 | End: 2024-11-05 | Stop reason: HOSPADM

## 2024-11-04 RX ORDER — METOPROLOL SUCCINATE 100 MG/1
100 TABLET, EXTENDED RELEASE ORAL 2 TIMES DAILY
COMMUNITY
End: 2024-11-05

## 2024-11-04 RX ADMIN — ASPIRIN 81 MG 81 MG: 81 TABLET ORAL at 08:34

## 2024-11-04 RX ADMIN — CARBAMAZEPINE 400 MG: 100 TABLET, EXTENDED RELEASE ORAL at 08:34

## 2024-11-04 RX ADMIN — ENOXAPARIN SODIUM 40 MG: 40 INJECTION SUBCUTANEOUS at 08:35

## 2024-11-04 RX ADMIN — GABAPENTIN 300 MG: 300 CAPSULE ORAL at 20:57

## 2024-11-04 RX ADMIN — METOPROLOL SUCCINATE 50 MG: 50 TABLET, EXTENDED RELEASE ORAL at 09:51

## 2024-11-04 RX ADMIN — ATORVASTATIN CALCIUM 40 MG: 40 TABLET, FILM COATED ORAL at 20:57

## 2024-11-04 RX ADMIN — METOPROLOL SUCCINATE 50 MG: 50 TABLET, EXTENDED RELEASE ORAL at 17:37

## 2024-11-04 RX ADMIN — DONEPEZIL HYDROCHLORIDE 10 MG: 5 TABLET ORAL at 20:57

## 2024-11-04 RX ADMIN — CARBAMAZEPINE 400 MG: 100 TABLET, EXTENDED RELEASE ORAL at 17:37

## 2024-11-04 NOTE — UTILIZATION REVIEW
Initial Inpatient Review    Observation 11/2 1824 changed to inpatient 11/4 1548. Pt requiring continued stay for change in carbamazepine dosing and placement.     Date: 11/4/24                         Current Patient Class: Inpatient  Current Level of Care: Med Surg    Admission Orders (From admission, onward)       Ordered        11/04/24 1548  INPATIENT ADMISSION  Once            11/02/24 1824  Place in Observation  Once                          Orders Placed This Encounter   Procedures    INPATIENT ADMISSION     Standing Status:   Standing     Number of Occurrences:   1     Order Specific Question:   Level of Care     Answer:   Med Surg [16]     Order Specific Question:   Estimated length of stay     Answer:   More than 2 Midnights     Order Specific Question:   Certification     Answer:   I certify that inpatient services are medically necessary for this patient for a duration of greater than two midnights. See H&P and MD Progress Notes for additional information about the patient's course of treatment.         HPI:74 y.o. male initially admitted on 11/4     Assessment/Plan:     11/4 Observation changed to inpatient.     Date: 11/4  Day 3: Has surpassed a 2nd midnight with active treatments and services. PT + ot eval: OT evaluation is the patient has cognitive deficits that are unsafe for independent living. Recommends 24/7 care and OP Cognitive Therapy. Echo pending. Continue telemetry and neuro checks. Carbamazepine level normal, Neuro rec 400mg BID. Unsure of prior medication compliance in setting of dementia. Seizure precautions. Start Aricept 10mg now dosage is confirmed by family. Will start him on metoprolol 50mg BID here now, hold losartan and amlodipine for now.      Medications:   Scheduled Medications:  aspirin, 81 mg, Oral, Daily  atorvastatin, 40 mg, Oral, QPM  carBAMazepine, 400 mg, Oral, BID  donepezil, 10 mg, Oral, HS  enoxaparin, 40 mg, Subcutaneous, Daily  gabapentin, 300 mg, Oral,  HS  metoprolol succinate, 50 mg, Oral, BID      Continuous IV Infusions:     PRN Meds:     Discharge Plan: TBD    Vital Signs (last 3 days)       Date/Time Temp Pulse Resp BP MAP (mmHg) SpO2 O2 Device Patient Position - Orthostatic VS Jeaneth Coma Scale Score Pain    11/04/24 1310 -- 93 -- 108/79 -- 96 % -- -- -- --    11/04/24 0951 -- 101 -- 108/79 -- -- -- -- -- --    11/04/24 0900 -- -- -- -- -- -- -- -- 15 No Pain    11/04/24 08:03:10 98.8 °F (37.1 °C) 101 -- 157/94 115 95 % -- -- -- --    11/03/24 20:14:25 97.6 °F (36.4 °C) 91 18 98/71 80 94 % -- -- -- --    11/03/24 2000 -- -- -- -- -- 95 % None (Room air) -- 15 No Pain    11/03/24 1600 -- -- -- -- -- -- -- -- 15 --    11/03/24 15:34:49 98.9 °F (37.2 °C) 86 -- 135/86 102 96 % -- -- -- --    11/03/24 1200 -- -- -- -- -- -- -- -- 15 --    11/03/24 0918 -- -- -- -- -- -- -- -- -- No Pain    11/03/24 08:00:25 98.5 °F (36.9 °C) 101 16 109/80 90 95 % None (Room air) Sitting -- --    11/03/24 0800 -- -- -- -- -- -- -- -- 15 --    11/03/24 0756 -- -- -- -- -- -- -- -- -- No Pain    11/03/24 0600 -- -- -- -- -- -- -- -- 15 --    11/03/24 0400 -- -- -- -- -- -- -- -- 15 --    11/03/24 02:46:17 99.5 °F (37.5 °C) 90 18 114/75 88 95 % -- Lying -- --    11/03/24 0200 -- -- -- -- -- -- -- -- 15 --    11/03/24 0100 -- -- -- -- -- -- -- -- 15 --    11/03/24 00:16:50 99.5 °F (37.5 °C) 96 -- 118/81 93 93 % -- -- -- --    11/03/24 0000 -- -- -- -- -- -- -- -- 15 --    11/02/24 2300 -- -- -- -- -- -- -- -- 15 --    11/02/24 2200 -- -- -- -- -- 95 % None (Room air) -- 15 No Pain    11/02/24 21:53:13 98.6 °F (37 °C) 94 -- 155/97 116 94 % -- -- -- --    11/02/24 21:52:50 98.6 °F (37 °C) 95 -- 155/97 116 94 % -- -- -- --    11/02/24 20:22:19 98.5 °F (36.9 °C) 91 17 151/89 110 94 % -- -- -- --    11/02/24 2000 -- 91 -- 155/84 112 -- -- -- -- --    11/02/24 1830 -- 85 18 168/93 -- 98 % None (Room air) -- 15 --    11/02/24 1755 -- -- -- -- -- -- -- -- 15 --          Weight (last 2 days)        Date/Time Weight    11/04/24 1310 99.3 (218.92)            Pertinent Labs/Diagnostic Results:   Radiology:  MRI brain w wo contrast   Final Interpretation by Jose R Modi MD (11/03 2125)      1. White matter changes suggestive of chronic microangiopathy.   2. No acute intracranial pathology.   3. Mild diffuse dural enhancement is nonspecific. There are no imaging findings of intracranial hypotension. Correlate for recent lumbar puncture or other spinal procedure.      Workstation performed: [id number]         CT head wo contrast   Final Interpretation by Jose R Modi MD (11/02 1807)      No acute intracranial abnormality.   Lacunar type infarcts in the right thalamus and right cerebellum are new since prior exam but appear chronic on CT. These were described on the outside head CT dated 5/23/2023.               Workstation performed: [id number]         VAS carotid complete study    (Results Pending)     Cardiology:  Echo complete w/ contrast if indicated   Final Result by Izabella Zavala MD (11/04 0326)        Left Ventricle: Left ventricular cavity size is normal. Wall thickness    is moderately increased. The left ventricular ejection fraction is 70%.    Systolic function is hyperdynamic. Wall motion is normal.     Aortic Valve: The leaflets are severely calcified. The leaflets exhibit    normal mobility. There is severe stenosis. The aortic valve peak velocity    is 5.62 m/s. The aortic valve mean gradient is 85 mmHg. The dimensionless    velocity index is 0.18. The aortic valve area is 0.42 cm2.     Mitral Valve: There is severe annular calcification. There is mild    regurgitation. There is mild stenosis. The mitral valve mean gradient is    4mmHg.      Severe aortic stenosis         ECG 12 lead   Final Result by Graeme Richard MD (11/03 0905)   Normal sinus rhythm   Cannot rule out Anterior infarct , age undetermined   Abnormal ECG   When compared with ECG of 20-DEC-2007 03:16,  "  No significant change was found   Confirmed by Graeme Richard ([id number]) on 11/3/2024 9:05:52 AM        GI:  No orders to display           Results from last 7 days   Lab Units 11/04/24  0907 11/02/24  1721   WBC Thousand/uL 6.93 7.23   HEMOGLOBIN g/dL 13.7 13.6   HEMATOCRIT % 40.6 40.9   PLATELETS Thousands/uL 165 177   TOTAL NEUT ABS Thousands/µL 5.97  --          Results from last 7 days   Lab Units 11/04/24  0446 11/03/24  0452 11/02/24  1721   SODIUM mmol/L 136 132* 131*   POTASSIUM mmol/L 4.4 4.1 4.3   CHLORIDE mmol/L 104 100 100   CO2 mmol/L 26 23 19*   ANION GAP mmol/L 6 9 12   BUN mg/dL 19 16 20   CREATININE mg/dL 1.18 1.10 1.24   EGFR ml/min/1.73sq m 60 65 56   CALCIUM mg/dL 8.9 8.5 8.7     Results from last 7 days   Lab Units 11/03/24  0452   AST U/L 20   ALT U/L 11   ALK PHOS U/L 91   TOTAL PROTEIN g/dL 6.1*   ALBUMIN g/dL 3.4*   TOTAL BILIRUBIN mg/dL 0.71     Results from last 7 days   Lab Units 11/04/24  0808 11/02/24  2153   POC GLUCOSE mg/dl 122 92     Results from last 7 days   Lab Units 11/04/24  0446 11/03/24  0452 11/02/24  1721   GLUCOSE RANDOM mg/dL 98 95 135         Results from last 7 days   Lab Units 11/03/24  0420   HEMOGLOBIN A1C % 5.3   EAG mg/dl 105     No results found for: \"BETA-HYDROXYBUTYRATE\"                   Results from last 7 days   Lab Units 11/02/24  1948 11/02/24  1721   HS TNI 0HR ng/L  --  8   HS TNI 2HR ng/L 19  --    HSTNI D2 ng/L 11  --          Results from last 7 days   Lab Units 11/02/24  1948   PROTIME seconds 13.7   INR  0.98   PTT seconds 23     Results from last 7 days   Lab Units 11/02/24  1721   TSH 3RD GENERATON uIU/mL 5.029*                                                                                                           Network Utilization Review Department  ATTENTION: Please call with any questions or concerns to [phone] and carefully listen to the prompts so that you are directed to the right person. All voicemails are confidential.   For " Discharge needs, contact Care Management DC Support Team at [phone] opt. 2  Send all requests for admission clinical reviews, approved or denied determinations and any other requests to dedicated fax number below belonging to the campus where the patient is receiving treatment. List of dedicated fax numbers for the Facilities:  FACILITY NAME UR FAX NUMBER   ADMISSION DENIALS (Administrative/Medical Necessity) [phone]   DISCHARGE SUPPORT TEAM (NETWORK) [phone]   PARENT CHILD HEALTH (Maternity/NICU/Pediatrics) [phone]   [facility] [phone]   [facility] [phone]   [facility] [phone]   [facility] [phone]   [facility] [phone]   [facility] [phone]   [facility] [phone]   [facility] [phone]   [facility] [phone]   [facility] [phone]   [facility] [phone]   [facility] [phone]

## 2024-11-04 NOTE — ASSESSMENT & PLAN NOTE
Neuroimaging revealed chronic lacunar infarcts in right thalamus and right cerebellum which is new from prior CT head (5/2023).  Negative for acute ischemia    Plan:  - Carotid US and echo pending  - Continue Aspirin 81 mg daily and Atorvastatin 40 mg qHS throughout admission and upon discharge  - BP goal normotension, avoid hypotension  - Patient can follow up with outpatient neurovascular  - Medical management and supportive care per primary team. Correction of any metabolic or infectious disturbances.

## 2024-11-04 NOTE — ASSESSMENT & PLAN NOTE
Patient and family report that he has baseline dementia on Aricept 10mg.  No mini cog eval on file.     Aoax2 on evaluation.     Plan  Start Aricept 10mg now dosage is confirmed by family  Pt/ot eval and case management to ensure safe discharge  OT evaluation is the patient has cognitive deficits that are unsafe for independent living. Recommends 24/7 care and OP Cognitive Therapy

## 2024-11-04 NOTE — ASSESSMENT & PLAN NOTE
Patient follows with [facility] neurology last appointment 4/13/2023.  Should be taking carBAMazepine (TEGretol) 200 mg tablet .  Patient lost to follow-up. Suspect poor compliance with medication. Hx suggests absence seizures but unlikely due to subjective family stating onset was 4 to 5 years ago.     No evidence of fall, tongue biting or loss of bladder function on exam.    Consider Seizure causes although less likely, Not hypoglycemic, hyponatremia on CMP is chronic compared to prior labs, no evidence of trauma on exam or on CT scan, no evidence of infection    Suspect stroke symptoms above are due to todds paralysis in setting of breakthrough seizure versus medication non-compliance.     Plan   - Carbamazepine level normal, Neuro rec 400mg BID. Unsure of prior medication compliance in setting of dementia.  - seizure precautions  - TSH 5.0

## 2024-11-04 NOTE — PHYSICAL THERAPY NOTE
PHYSICAL THERAPY NOTE          Patient Name: Jon Morton  Today's Date: 11/4/2024 11/04/24 1513   PT Last Visit   PT Visit Date 11/04/24   Note Type   Note Type Treatment   Pain Assessment   Pain Assessment Tool 0-10   Pain Score No Pain   Restrictions/Precautions   Weight Bearing Precautions Per Order No   Other Precautions Cognitive;Bed Alarm;Chair Alarm;Fall Risk;Pain   General   Chart Reviewed Yes   Response to Previous Treatment Patient with no complaints from previous session.   Family/Caregiver Present No   Cognition   Overall Cognitive Status (S)  Impaired   Arousal/Participation Alert;Responsive;Cooperative   Attention Attends with cues to redirect   Orientation Level Oriented to person;Oriented to place;Oriented to situation;Disoriented to time   Memory Decreased recall of recent events;Decreased short term memory;Decreased recall of precautions   Following Commands Follows one step commands with increased time or repetition   Comments pt continues to demonstrate limited insight into his deficits. pt required Vc's for safety with all OOB activities as pt would get easily distracted   Subjective   Subjective pt stated no pain in toadys tx session and was agreeable to participate in PT intervention   Bed Mobility   Sit to Supine 6  Modified independent   Additional items Assist x 1;HOB elevated;Bedrails;Increased time required;Verbal cues   Additional Comments pt seated OOB in the recliner pre tx session and supine post tx session   Transfers   Sit to Stand 5  Supervision   Additional items Assist x 1;Armrests;Increased time required;Verbal cues   Stand to Sit 5  Supervision   Additional items Assist x 1;Armrests;Increased time required   Stand pivot 5  Supervision   Additional items Assist x 1;Armrests;Increased time required;Verbal cues   Additional Comments pt completed multiple functional transfesr w/o an Ad  with close /s, no LOB but required Vc's for hand placement in order to increase safety and balance w/ transfers   Ambulation/Elevation   Gait pattern Narrow FRANCESCO;Decreased foot clearance;Short stride;Excessively slow;Decreased hip extension;Decreased heel strike;Decreased toe off   Gait Assistance 4  Minimal assist  (min Ax1 w/o an AD and close /s with SPC)   Additional items Assist x 1;Verbal cues   Assistive Device None;SPC   Distance 100'x1 w/o an 'x1 SPC   Stair Management Assistance Not tested   Ambulation/Elevation Additional Comments comfortable gait speed 0.52 m/s   Balance   Static Sitting Good   Dynamic Sitting Fair +   Static Standing Fair   Dynamic Standing Fair -   Ambulatory Poor +  (pt w/ LOB when ambulating w/o an AD. pt w/ better balance when ambulating with SPC)   Endurance Deficit   Endurance Deficit Yes   Endurance Deficit Description limited ambulation distance   Activity Tolerance   Nurse Made Aware Spoke to RN   Exercises   Hip Abduction Sitting;20 reps;AROM;Bilateral   Hip Adduction Sitting;20 reps;AROM;Bilateral  (pillow squeezes)   Knee AROM Long Arc Quad Sitting;15 reps;AROM;Bilateral   Ankle Pumps Sitting;20 reps;AROM;Bilateral   Marching Sitting;10 reps;AROM;Bilateral   Assessment   Problem List Decreased strength;Decreased endurance;Impaired balance;Decreased mobility;Decreased safety awareness   Assessment pt began tx session seated OOB in the recliner as pt was agreeable to participate in PT intervention. PT to focus on transfer training, TE activities, posture/balance with gait and increasing pt activity tolerance/endurance. pt continues to remain consistant as pt completed multiple functional transfers w/o an AD with /s , no LOB. pt did increase ambulation distance as pt ambulated 100'x1 w/o an AD and 150'x1 SPC. pt required min Ax1 as pt had LOB when ambulating w/o an AD and demonstrated better balance when ambulating with SPC close /s, no LOB. pt was able to sit EOB  participate in TE actiuvitie sin order to strengthen Le's and increase pt static/dynamic sitting balance. Post tx pt remains at an increased risk for falls and injuries when ambulating w/o an AD and would benefit from conmtinued skilled PT intervention in order to increase pt safety with all OOB activities. Post tx pt in bed with call bell and all pt needs met   Goals   Patient Goals none stated   STG Expiration Date 11/04/24   PT Treatment Day 1   Discharge Recommendation   Rehab Resource Intensity Level, PT III (Minimum Resource Intensity)   AM-PAC Basic Mobility Inpatient   Turning in Flat Bed Without Bedrails 3   Lying on Back to Sitting on Edge of Flat Bed Without Bedrails 3   Moving Bed to Chair 3   Standing Up From Chair Using Arms 3   Walk in Room 3   Climb 3-5 Stairs With Railing 3   Basic Mobility Inpatient Raw Score 18   Basic Mobility Standardized Score 41.05   [facility] Highest Level Of Mobility   JH-HLM Goal 6: Walk 10 steps or more   JH-HLM Achieved 7: Walk 25 feet or more   Education   Education Provided Mobility training;Assistive device   Patient Reinforcement needed   End of Consult   Patient Position at End of Consult Supine;Bed/Chair alarm activated;All needs within reach   The patient's AM-PAC Basic Mobility Inpatient Short Form Raw Score is 18. A Raw score of greater than 16 suggests the patient may benefit from discharge to home. Please also refer to the recommendation of the Physical Therapist for safe discharge planning.    Trev Peter

## 2024-11-04 NOTE — MALNUTRITION/BMI
This medical record reflects one or more clinical indicators suggestive of malnutrition.    Malnutrition Findings:   Adult Malnutrition type: Chronic illness  Adult Degree of Malnutrition: Malnutrition of moderate degree  Malnutrition Characteristics: Muscle loss, Inadequate energy, Fat loss                360 Statement: Chronic/moderate malnutrition r/t condition/inadequate PO intake, as evidenced by intake meeting <75% of estimated needs for > 1 month and moderate fat loss/muscle mass depletion at buccal/temporal regions. Treatment: PO diet + oral nutrition supplement       There is no height or weight on file to calculate BMI.     See Nutrition note dated 11/4/24 for additional details.  Completed nutrition assessment is viewable in the nutrition documentation.

## 2024-11-04 NOTE — UTILIZATION REVIEW
NOTIFICATION OF INPATIENT ADMISSION   AUTHORIZATION REQUEST   SERVICING FACILITY:   [place] [address]  Tax ID: [id number]  NPI: [id number]   ATTENDING PROVIDER:  Attending Name and NPI#: Kunal Farris Md [[id number]]  Address: [address] [place] [address]  Phone: [phone]     ADMISSION INFORMATION:  Place of Service: Inpatient Acute [facility]  Place of Service Code: 21  Inpatient Admission Date/Time: 11/4/24  3:48 PM  Discharge Date/Time: No discharge date for patient encounter.  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     PHYSICIAN ADVISORY SERVICES:  Medical Necessity Denial & Ojbn-yd-Zdfw Review  Phone: [phone]  Fax: [phone]  Email: [email or web address]     DISCHARGE SUPPORT TEAM:  For Patients Discharge Needs & Updates  Phone: [phone] opt. 2 Fax: [phone]  Email: [email or web address]

## 2024-11-04 NOTE — CASE MANAGEMENT
Case Management Assessment & Discharge Planning Note    Patient name Jon Morton  Location S /S -01 MRN [id number]  : 1950 Date 2024       Current Admission Date: 2024  Current Admission Diagnosis:Seizure (HCC)   Patient Active Problem List    Diagnosis Date Noted Date Diagnosed    Neuropathy 2024     Stroke (HCC) 2024     Seizure (HCC) 2024     HTN (hypertension) 2024     Dementia (HCC) 2024       LOS (days): 0  Geometric Mean LOS (GMLOS) (days):   Days to GMLOS:     OBJECTIVE:              Current admission status: Observation       Preferred Pharmacy:   UNKNOWN - FOLLOW UP PRIOR TO DISCHARGE TO E-PRESCRIBE  No address on file      Primary Care Provider: No primary care provider on file.    Primary Insurance: [facility]  Secondary Insurance:     ASSESSMENT:  Active Health Care Proxies    There are no active Health Care Proxies on file.                      Patient Information  Admitted from:: Home  Mental Status: Confused  During Assessment patient was accompanied by: Not accompanied during assessment  Assessment information provided by:: Other - please comment (niece)  Primary Caregiver: Self  Support Systems: Family members  County of Residence: [place]  What city do you live in?: [place]  Home entry access options. Select all that apply.: No steps to enter home  Type of Current Residence: Apartment  Upon entering residence, is there a bedroom on the main floor (no further steps)?: Yes  Upon entering residence, is there a bathroom on the main floor (no further steps)?: Yes  Living Arrangements: Lives Alone    Activities of Daily Living Prior to Admission  Functional Status: Independent  Completes ADLs independently?: Yes  Ambulates independently?: Yes  Does patient use assisted devices?: No  Does patient currently own DME?: No  Does patient have a history of Outpatient Therapy (PT/OT)?: No  Does the patient have a history of  Short-Term Rehab?: No  Does patient have a history of HHC?: No  Does patient currently have HHC?: No    Current Home Health Care  Home Health Agency Name:: [facility]    Patient Information Continued  Does patient have prescription coverage?: Yes  Does patient receive dialysis treatments?: No         Means of Transportation  Means of Transport to Appts:: Family transport      Social Determinants of Health (SDOH)      Flowsheet Row Most Recent Value   Housing Stability    In the last 12 months, was there a time when you were not able to pay the mortgage or rent on time? N   At any time in the past 12 months, were you homeless or living in a shelter (including now)? N   Transportation Needs    In the past 12 months, has lack of transportation kept you from medical appointments or from getting medications? no   In the past 12 months, has lack of transportation kept you from meetings, work, or from getting things needed for daily living? No   Food Insecurity    Within the past 12 months, you worried that your food would run out before you got the money to buy more. Never true   Within the past 12 months, the food you bought just didn't last and you didn't have money to get more. Never true   Utilities    In the past 12 months has the electric, gas, oil, or water company threatened to shut off services in your home? No            DISCHARGE DETAILS:    Discharge planning discussed with:: patient's kody Lyles  Freedom of Choice: Yes  Comments - Freedom of Choice: CM s/w patient's kody Lyles re: assessment and dcp. Per niece pt lives alone in one level apartment, no KALLIE, FFSU. Per niece pt independent with ADLS, and does not use any DME at baseline. Niece relayed pt has strong support system - several friends/neighbors that check in on pt often and niece assists with pt grocery shopping and transport to appointments. PT/OT currently [facility] w/ 24/7 supervision. Niece aware of same and choosing for patient to return  to home at this time with hers and friends support. Niece open to HHC blanket referral w/ preference for [facility] - HHC referrals sent via aidin. [place] also inquired into other possible community resources for patient - discussed [employer] with kody and referral sent to [employer] via aidin. [employer] to reach out to nicarlo directly for f/u - niece aware of same. [facility] find help also utilized for possible resources for patient. Resource list emailed to kody at [email or web address]. Niece to provide pt transport home when ready for d/c. Response received from [facility] via aidin - confirmed available. CM f/u with niece re: same - confirmed HHC choice remains for [facility] - hhc referrals closed early in aidin and [facility] reserved. AVS updated.  CM contacted family/caregiver?: Yes  Were Treatment Team discharge recommendations reviewed with patient/caregiver?: Yes  Did patient/caregiver verbalize understanding of patient care needs?: Yes  Were patient/caregiver advised of the risks associated with not following Treatment Team discharge recommendations?: Yes    Contacts  Patient Contacts: Trupti Shanitakody)  Relationship to Patient:: Family  Contact Method: Phone  Phone Number: [phone]  Reason/Outcome: Continuity of Care, Emergency Contact, Referral, Discharge Planning    Requested Home Health Care         Is the patient interested in HHC at discharge?: Yes  Home Health Discipline requested:: Nursing, Occupational Therapy, Physical Therapy  Home Health Agency Name:: [facility]  HHA External Referral Reason (only applicable if external HHA name selected): Patient has established relationship with provider  Home Health Follow-Up Provider:: PCP  Home Health Services Needed:: Evaluate Functional Status and Safety, Gait/ADL Training, Strengthening/Theraputic Exercises to Improve Function  Homebound Criteria Met:: Requires the Assistance of Another Person for Safe Ambulation or to Leave the Home, Uses an Assist Device  (i.e. cane, walker, etc)  Supporting Clincal Findings:: Limited Endurance, Fatigues Easliy in Short Distances    DME Referral Provided  Referral made for DME?: No    Other Referral/Resources/Interventions Provided:  Interventions: C  Referral Comments: [facility] reserved in aidin.         Treatment Team Recommendation: Home with Home Health Care  Discharge Destination Plan:: Home with Home Health Care  Transport at Discharge : Family

## 2024-11-04 NOTE — ASSESSMENT & PLAN NOTE
Neuroimaging revealed chronic lacunar infarcts in right thalamus and right cerebellum which is new from prior CT head (5/2023).  Negative for acute ischemia    Plan:  - No need for stroke pathway at this time  - Continue Aspirin 81 mg daily and Atorvastatin 40 mg qHS throughout admission and upon discharge  - BP goal normotension, avoid hypotension  - Patient can follow up with outpatient neurovascular  - Medical management and supportive care per primary team. Correction of any metabolic or infectious disturbances.

## 2024-11-04 NOTE — ASSESSMENT & PLAN NOTE
74 year old male with PMHx including HTN, epilepsy, neuropathy who presented s/p seizure.  Patient was witnessed to have a GTC prior to arrival with post-ictal confusion and paralysis of the LUE/LLE.  Upon arrival to ED, symptoms were noted to be improving however continued to have some residual weakness and confusion.  All symptoms resolved and patient was admitted to the hospital.    Patient reported a history of seizures although was unclear on the details. He takes Carbamazepine at home but was unsure of dosing. Primary team also attempted to ask family but they were unsure as well. Per most recent medication refill note by his neurology group at [facility] in June 2024, he was recommended for carbamazepine 300 mg in the morning and 400 mg in the evening.     Workup:  - CTH (11/1) negative for acute abnormalities, revealed chronic lacunar infarcts in right thalamus and right cerebellum (however these are new since prior CTH 5/2023)  - MRI brain with/without negative for acute findings, however there is nonspecific mild diffuse dural enhancement seen, no findings of intracranial hypotension  - Carbamazepine level: 7.1  - B12 and folate pending    Patient continues to be at his baseline.  No clear provoking factor for seizure. Overall, suspect breakthrough seizure with post-ictal state including Kody's paralysis.      Plan:  - Carbamazepine increased to 400 mg [id number].   - No indication for EEG at this time with known history of epilepsy and patient at his baseline. If there is suspicion for recurrent seizure while admitted, consider transfer for vEEG.   - UA pending  - Telemetry  - [employer] form submitted online and uploaded into the chart, he does not drive but holds an active license.  Reemphasized importance of not driving   - Seizure precautions while admitted  - Outpatient seizure precautions discussed with patient: no driving, no operating heavy machinery, no swimming alone, no climbing, no baths only showers, no  cooking alone, or any activity that would put them at increased risk of harm if they were to have a seizure.  - Medical management and supportive care per primary team. Correction of any metabolic or infectious disturbances.     Plan discussed with Attending Neurologist, please see attestation for further input/recommendations.

## 2024-11-04 NOTE — PROGRESS NOTES
Progress Note - Hospitalist   Name: Jon Morton 74 y.o. male I MRN: [id number]  Unit/Bed#: S -01 I Date of Admission: 11/2/2024   Date of Service: 11/4/2024 I Hospital Day: 0    Assessment & Plan  Stroke (HCC)  NIH score on evaluation 11/2: 0  CT head: Lacunar type infarcts in the right thalamus and right cerebellum are new since prior exam but appear chronic on CT. These were described on the outside head CT dated 5/23/2023.   MRI Brain: 1. White matter changes suggestive of chronic microangiopathy. 2. No acute intracranial pathology. 3. Mild diffuse dural enhancement is nonspecific. There are no imaging findings of intracranial hypotension. Correlate for recent lumbar puncture or other spinal procedure.    Suspect symptoms experienced by the patient are due to Kody's paralysis caused by breakthrough seizure.  CT and MRI show no evidence of acute CVA.     Plan  - neuro consult  - continue asprin 81mg home medication, and lipitor 40mg  - Echocardiogram with bubble study, not yet done  - PT + ot eval: OT evaluation is the patient has cognitive deficits that are unsafe for independent living. Recommends 24/7 care and OP Cognitive Therapy  - Neuro checks and tele per neurology  - Speech and swallow eval: regular diet with thin liquids, low risk  Seizure (HCC)  Patient follows with [facility] neurology last appointment 4/13/2023.  Should be taking carBAMazepine (TEGretol) 200 mg tablet .  Patient lost to follow-up. Suspect poor compliance with medication. Hx suggests absence seizures but unlikely due to subjective family stating onset was 4 to 5 years ago.     No evidence of fall, tongue biting or loss of bladder function on exam.    Consider Seizure causes although less likely, Not hypoglycemic, hyponatremia on CMP is chronic compared to prior labs, no evidence of trauma on exam or on CT scan, no evidence of infection    Suspect stroke symptoms above are due to todds paralysis in setting of breakthrough seizure  versus medication non-compliance.     Plan   - Carbamazepine level normal, Neuro rec 400mg BID. Unsure of prior medication compliance in setting of dementia.  - seizure precautions  - TSH 5.0  HTN (hypertension)  Patient has not seen a physician in greater than a year in a half. Patient has history of HTN.  Permissive hypertension while stroke workup was underway.  Home doses of medications as told by patient's legal guardian and niece, Trupti. Losartan 100mg qd, Metoprolol 100mg BID, Amlodipine 10mg BID    Plan   - Will start him on metoprolol 50mg BID here now, hold losartan and amlodipine for now  - Questionable medication compliance as patient has baseline dementia and lives alone and manages his own medications.  Dementia (HCC)  Patient and family report that he has baseline dementia on Aricept 10mg.  No mini cog eval on file.     Aoax2 on evaluation.     Plan  Start Aricept 10mg now dosage is confirmed by family  Pt/ot eval and case management to ensure safe discharge  OT evaluation is the patient has cognitive deficits that are unsafe for independent living. Recommends 24/7 care and OP Cognitive Therapy  Neuropathy  - Bilateral feet  - Continue home gabapentin 300mg qhs    VTE Pharmacologic Prophylaxis: VTE Score: 8 High Risk (Score >/= 5) - Pharmacological DVT Prophylaxis Ordered: enoxaparin (Lovenox). Sequential Compression Devices Ordered.    Mobility:   Basic Mobility Inpatient Raw Score: 18  JH-HLM Goal: 6: Walk 10 steps or more  JH-HLM Achieved: 7: Walk 25 feet or more  JH-HLM Goal achieved. Continue to encourage appropriate mobility.    Patient Centered Rounds: I performed bedside rounds with nursing staff today.   Discussions with Specialists or Other Care Team Provider: Care management    Education and Discussions with Family / Patient: Updated  (niece) via phone.    Current Length of Stay: 0 day(s)  Current Patient Status: Observation   Certification Statement: The patient will continue  to require additional inpatient hospital stay due to workup for potential CVA and seizure management, unsafe discharge home.  Discharge Plan: Anticipate discharge in 24-48 hrs to discharge location to be determined pending rehab evaluations.    Code Status: Level 1 - Full Code    Subjective   Patient was doing well this morning when evaluated at bedside.  He was alert and eating breakfast.  No new symptoms or deficits noted by patient.  He has no chest pain or shortness of breath.  No headaches.  I did speak to his legal guardian and niece, Trupti, this morning by phone.  She is eager to talk to care management for his discharge planning.  I did send a message to care management to facilitate their conversation.    Objective :  Temp:  [97.6 °F (36.4 °C)-98.9 °F (37.2 °C)] 98.8 °F (37.1 °C)  HR:  [] 101  BP: ()/(71-94) 157/94  Resp:  [18] 18  SpO2:  [94 %-96 %] 95 %  O2 Device: None (Room air)    There is no height or weight on file to calculate BMI.     Input and Output Summary (last 24 hours):     Intake/Output Summary (Last 24 hours) at 11/4/2024 0918  Last data filed at 11/4/2024 0917  Gross per 24 hour   Intake 1320 ml   Output --   Net 1320 ml       Physical Exam  Vitals and nursing note reviewed.   Constitutional:       General: He is not in acute distress.     Appearance: He is well-developed.   HENT:      Head: Normocephalic and atraumatic.   Eyes:      Conjunctiva/sclera: Conjunctivae normal.   Cardiovascular:      Rate and Rhythm: Normal rate and regular rhythm.      Heart sounds: Murmur heard.   Pulmonary:      Effort: Pulmonary effort is normal. No respiratory distress.      Breath sounds: Normal breath sounds. No wheezing.   Abdominal:      Palpations: Abdomen is soft.      Tenderness: There is no abdominal tenderness.   Musculoskeletal:         General: No swelling or signs of injury.   Skin:     General: Skin is warm and dry.   Neurological:      General: No focal deficit present.       Mental Status: He is alert.      Comments: Oriented x2   Psychiatric:         Mood and Affect: Mood normal.       Telemetry:  Telemetry Orders (From admission, onward)               24 Hour Telemetry Monitoring  Continuous x 24 Hours (Telem)        Question:  Reason for 24 Hour Telemetry  Answer:  TIA/Suspected CVA/ Confirmed CVA                     Telemetry Reviewed:  NSR with some sinus tach  Indication for Continued Telemetry Use: Acute CVA               Lab Results: I have reviewed the following results:   Results from last 7 days   Lab Units 11/04/24  0907   WBC Thousand/uL 6.93   HEMOGLOBIN g/dL 13.7   HEMATOCRIT % 40.6   PLATELETS Thousands/uL 165   SEGS PCT % 87*   LYMPHO PCT % 8*   MONO PCT % 5   EOS PCT % 0     Results from last 7 days   Lab Units 11/04/24  0446 11/03/24  0452   SODIUM mmol/L 136 132*   POTASSIUM mmol/L 4.4 4.1   CHLORIDE mmol/L 104 100   CO2 mmol/L 26 23   BUN mg/dL 19 16   CREATININE mg/dL 1.18 1.10   ANION GAP mmol/L 6 9   CALCIUM mg/dL 8.9 8.5   ALBUMIN g/dL  --  3.4*   TOTAL BILIRUBIN mg/dL  --  0.71   ALK PHOS U/L  --  91   ALT U/L  --  11   AST U/L  --  20   GLUCOSE RANDOM mg/dL 98 95     Results from last 7 days   Lab Units 11/02/24  1948   INR  0.98     Results from last 7 days   Lab Units 11/04/24  0808 11/02/24  2153   POC GLUCOSE mg/dl 122 92     Results from last 7 days   Lab Units 11/03/24  0420   HEMOGLOBIN A1C % 5.3           Recent Cultures (last 7 days):         Imaging Results Review: I reviewed radiology reports from this admission including: CT head and MRI brain.      Last 24 Hours Medication List:     Current Facility-Administered Medications:     aspirin chewable tablet 81 mg, Daily    atorvastatin (LIPITOR) tablet 40 mg, QPM    carBAMazepine (TEGretol XR) 12 hr tablet 400 mg, BID    donepezil (ARICEPT) tablet 10 mg, HS    enoxaparin (LOVENOX) subcutaneous injection 40 mg, Daily    gabapentin (NEURONTIN) capsule 300 mg, HS    metoprolol succinate (TOPROL-XL) 24 hr  tablet 50 mg, BID    Administrative Statements   Today, Patient Was Seen By: Donnell Teague DO      **Please Note: This note may have been constructed using a voice recognition system.**

## 2024-11-04 NOTE — PROGRESS NOTES
Progress Note - Neurology   Jon Morton 74 y.o. male [id number]  Unit/Bed#: S /S -01    Assessment/Plan:  * Seizure (HCC)  Assessment & Plan  74 year old male with PMHx including HTN, epilepsy, neuropathy who presented s/p seizure.  Patient was witnessed to have a GTC prior to arrival with post-ictal confusion and paralysis of the LUE/LLE.  Upon arrival to ED, symptoms were noted to be improving however continued to have some residual weakness and confusion.  All symptoms resolved and patient was admitted to the hospital.    Patient reported a history of seizures although was unclear on the details. He takes Carbamazepine at home but was unsure of dosing. Primary team also attempted to ask family but they were unsure as well. Per most recent medication refill note by his neurology group at [facility] in June 2024, he was recommended for carbamazepine 300 mg in the morning and 400 mg in the evening.     Workup:  - CTH (11/1) negative for acute abnormalities, revealed chronic lacunar infarcts in right thalamus and right cerebellum (however these are new since prior CTH 5/2023)  - MRI brain with/without negative for acute findings, however there is nonspecific mild diffuse dural enhancement seen, no findings of intracranial hypotension  - Carbamazepine level: 7.1  - B12 and folate pending    Patient continues to be at his baseline.  No clear provoking factor for seizure. Overall, suspect breakthrough seizure with post-ictal state including Kody's paralysis.      Plan:  - Carbamazepine increased to 400 mg [id number].   - No indication for EEG at this time with known history of epilepsy and patient at his baseline. If there is suspicion for recurrent seizure while admitted, consider transfer for vEEG.   - UA pending  - Telemetry  - [employer] form submitted online and uploaded into the chart, he does not drive but holds an active license.  Reemphasized importance of not driving   - Seizure precautions while  admitted  - Outpatient seizure precautions discussed with patient: no driving, no operating heavy machinery, no swimming alone, no climbing, no baths only showers, no cooking alone, or any activity that would put them at increased risk of harm if they were to have a seizure.  - Medical management and supportive care per primary team. Correction of any metabolic or infectious disturbances.     Plan discussed with Attending Neurologist, please see attestation for further input/recommendations.     Stroke (HCC)  Assessment & Plan  Neuroimaging revealed chronic lacunar infarcts in right thalamus and right cerebellum which is new from prior CT head (5/2023).  Negative for acute ischemia    Plan:  - Carotid US and echo pending  - Continue Aspirin 81 mg daily and Atorvastatin 40 mg qHS throughout admission and upon discharge  - BP goal normotension, avoid hypotension  - Patient can follow up with outpatient neurovascular  - Medical management and supportive care per primary team. Correction of any metabolic or infectious disturbances.       Patient previously followed by [facility] neurology Dr. Michelle. Instructed patient he should make an appointment with neurologist upon discharge. He would benefit from a formal neurocognitive evaluation.    Subjective:   Patient reports feeling at baseline today, no further seizures or left sided weakness since admission. Medications including aspirin, atorvastatin and increased carbamazepine reviewed with patient. Reviewed seizure precautions with patient, he verbalized understanding.    During reevaluation the afternoon with attending neurologist, patient had difficulty correctly answering orientation questions.  He admitted he was a prior smoker, however quit years ago.    No past medical history on file.  No past surgical history on file.  No family history on file.  Social History     Socioeconomic History    Marital status: Unknown     Spouse name: Not on file    Number of children:  "Not on file    Years of education: Not on file    Highest education level: Not on file   Occupational History    Not on file   Tobacco Use    Smoking status: Not on file    Smokeless tobacco: Not on file   Substance and Sexual Activity    Alcohol use: Not on file    Drug use: Not on file    Sexual activity: Not on file   Other Topics Concern    Not on file   Social History Narrative    Not on file     Social Determinants of Health     Financial Resource Strain: Not on file   Food Insecurity: No Food Insecurity (11/4/2024)    Hunger Vital Sign     Worried About Running Out of Food in the Last Year: Never true     Ran Out of Food in the Last Year: Never true   Transportation Needs: No Transportation Needs (11/4/2024)    PRAPARE - Transportation     Lack of Transportation (Medical): No     Lack of Transportation (Non-Medical): No   Physical Activity: Not on file   Stress: Not on file   Social Connections: Not on file   Intimate Partner Violence: Unknown (11/2/2024)    Nursing IPS     Feels Physically and Emotionally Safe: Not on file     Physically Hurt by Someone: Not on file     Humiliated or Emotionally Abused by Someone: Not on file     Physically Hurt by Someone: 2     Hurt or Threatened by Someone: 2   Housing Stability: Unknown (11/4/2024)    Housing Stability Vital Sign     Unable to Pay for Housing in the Last Year: No     Number of Times Moved in the Last Year: Not on file     Homeless in the Last Year: No       Medications: Reviewed in detail by me.    ROS: Review of Systems   Constitutional:  Negative for diaphoresis and fatigue.   HENT:  Negative for trouble swallowing.    Eyes:  Negative for photophobia and visual disturbance.   Respiratory:  Negative for cough and shortness of breath.    Cardiovascular:  Negative for chest pain and palpitations.   Musculoskeletal:  Positive for gait problem (c/o of intermittently \"feeling off\" when walking). Negative for neck pain.   Skin:  Negative for color change and " pallor.   Neurological:  Positive for numbness (2/2 neuropathy in b/l feet). Negative for dizziness, tremors, facial asymmetry, speech difficulty, weakness, light-headedness and headaches.   Psychiatric/Behavioral:  Negative for confusion. The patient is not nervous/anxious.         Vitals: /79   Pulse 101   Temp 98.8 °F (37.1 °C)   Resp 18   SpO2 95%     Physical Exam:   Physical Exam  Vitals reviewed.   Constitutional:       General: He is not in acute distress.     Appearance: Normal appearance. He is normal weight. He is not ill-appearing.   HENT:      Head: Atraumatic.      Comments: Bilateral temporal wasting     Right Ear: External ear normal.      Left Ear: External ear normal.      Nose: Nose normal.      Mouth/Throat:      Mouth: Mucous membranes are moist.      Comments: Edentulous   Eyes:      General:         Right eye: No discharge.         Left eye: No discharge.      Extraocular Movements: Extraocular movements intact and EOM normal.      Conjunctiva/sclera: Conjunctivae normal.      Pupils: Pupils are equal, round, and reactive to light.   Cardiovascular:      Rate and Rhythm: Normal rate.   Pulmonary:      Effort: Pulmonary effort is normal. No respiratory distress.   Musculoskeletal:         General: Normal range of motion.      Right lower leg: No edema.      Left lower leg: No edema.   Skin:     General: Skin is warm and dry.      Coloration: Skin is not jaundiced or pale.   Neurological:      Mental Status: He is alert and oriented to person, place, and time.      Motor: Motor strength is normal.     Coordination: Finger-Nose-Finger Test normal.      Comments: See below   Psychiatric:         Mood and Affect: Mood normal.         Speech: Speech normal.         Behavior: Behavior normal.       Neurologic Exam     Mental Status   Oriented to person, place, and time.   Follows 2 step commands.   Attention: normal. Concentration: normal.   Speech: speech is normal (Slightly hypophonic,  patient states this is his baseline)  Level of consciousness: alert  Able to name object. Able to repeat.     Cranial Nerves     CN II   Visual fields full to confrontation.     CN III, IV, VI   Pupils are equal, round, and reactive to light.  Extraocular motions are normal.   Nystagmus: none   Diplopia: none  Conjugate gaze: present    CN V   Facial sensation intact.     CN VII   Facial expression full, symmetric.     CN VIII   CN VIII normal.     CN IX, X   CN IX normal.     CN XII   CN XII normal.   Tongue atrophy: no tongue bite appreciated.    Motor Exam   Right arm pronator drift: absent  Left arm pronator drift: absent    Strength   Strength 5/5 throughout.     Sensory Exam   Light touch normal.     Gait, Coordination, and Reflexes     Coordination   Finger to nose coordination: normal    Tremor   Resting tremor: absent  Intention tremor: absent      Labs: Reviewed in detail by me.     Imaging: I have personally reviewed pertinent imaging and PACS reports.     VTE Prophylaxis: Enoxaparin (Lovenox)      Please note: This note has been constructed using a voice recognition system

## 2024-11-04 NOTE — ASSESSMENT & PLAN NOTE
Patient has not seen a physician in greater than a year in a half. Patient has history of HTN.  Permissive hypertension while stroke workup was underway.  Home doses of medications as told by patient's legal guardian and niece, Trupti. Losartan 100mg qd, Metoprolol 100mg BID, Amlodipine 10mg BID    Plan   - Will start him on metoprolol 50mg BID here now, hold losartan and amlodipine for now  - Questionable medication compliance as patient has baseline dementia and lives alone and manages his own medications.

## 2024-11-04 NOTE — ASSESSMENT & PLAN NOTE
NIH score on evaluation 11/2: 0  CT head: Lacunar type infarcts in the right thalamus and right cerebellum are new since prior exam but appear chronic on CT. These were described on the outside head CT dated 5/23/2023.   MRI Brain: 1. White matter changes suggestive of chronic microangiopathy. 2. No acute intracranial pathology. 3. Mild diffuse dural enhancement is nonspecific. There are no imaging findings of intracranial hypotension. Correlate for recent lumbar puncture or other spinal procedure.    Suspect symptoms experienced by the patient are due to Kody's paralysis caused by breakthrough seizure.  CT and MRI show no evidence of acute CVA.     Plan  - neuro consult  - continue asprin 81mg home medication, and lipitor 40mg  - Echocardiogram with bubble study, not yet done  - PT + ot eval: OT evaluation is the patient has cognitive deficits that are unsafe for independent living. Recommends 24/7 care and OP Cognitive Therapy  - Neuro checks and tele per neurology  - Speech and swallow eval: regular diet with thin liquids, low risk

## 2024-11-04 NOTE — PLAN OF CARE
Problem: PHYSICAL THERAPY ADULT  Goal: Performs mobility at highest level of function for planned discharge setting.  See evaluation for individualized goals.  Description:            See flowsheet documentation for full assessment, interventions and recommendations.  Outcome: Progressing  Note:    Problem List: Decreased strength, Decreased endurance, Impaired balance, Decreased mobility, Decreased safety awareness  Assessment: pt began tx session seated OOB in the recliner as pt was agreeable to participate in PT intervention. PT to focus on transfer training, TE activities, posture/balance with gait and increasing pt activity tolerance/endurance. pt continues to remain consistant as pt completed multiple functional transfers w/o an AD with /s , no LOB. pt did increase ambulation distance as pt ambulated 100'x1 w/o an AD and 150'x1 SPC. pt required min Ax1 as pt had LOB when ambulating w/o an AD and demonstrated better balance when ambulating with SPC close /s, no LOB. pt was able to sit EOB participate in TE actiuvitie sin order to strengthen Le's and increase pt static/dynamic sitting balance. Post tx pt remains at an increased risk for falls and injuries when ambulating w/o an AD and would benefit from conmtinued skilled PT intervention in order to increase pt safety with all OOB activities. Post tx pt in bed with call bell and all pt needs met        Rehab Resource Intensity Level, PT: III (Minimum Resource Intensity)    See flowsheet documentation for full assessment.

## 2024-11-04 NOTE — ASSESSMENT & PLAN NOTE
74 year old male with PMHx including HTN, epilepsy, neuropathy who presented s/p seizure.  Patient was witnessed to have a GTC prior to arrival with post-ictal confusion and paralysis of the LUE/LLE.  Upon arrival to ED, symptoms were noted to be improving however continued to have some residual weakness and confusion.  All symptoms resolved and patient was admitted to the hospital.    Patient reported a history of seizures although was unclear on the details. He takes Carbamazepine at home but was unsure of dosing. Primary team also attempted to ask family but they were unsure as well. Per most recent medication refill note by his neurology group at [facility] in June 2024, he was recommended for carbamazepine 300 mg in the morning and 400 mg in the evening.     Workup:  - CTH (11/1) negative for acute abnormalities, revealed chronic lacunar infarcts in right thalamus and right cerebellum (however these are new since prior CTH 5/2023)  - MRI brain with/without negative for acute findings, however there is nonspecific mild diffuse dural enhancement seen, no findings of intracranial hypotension  - Carbamazepine level: 7.1  - B12 and folate pending    Patient continues to be at his baseline.  No clear provoking factor for seizure. Overall, suspect breakthrough seizure with post-ictal state including Kody's paralysis.      Plan:  - Carbamazepine increased to 400 mg [id number].   - No indication for EEG at this time with known history of epilepsy and patient at his baseline. If there is suspicion for recurrent seizure while admitted, consider transfer for vEEG.   - UA pending  - Telemetry  - [employer] form submitted online and uploaded into the chart, he does not drive but holds an active license.  Reemphasized importance of not driving   - Seizure precautions while admitted  - Outpatient seizure precautions discussed with patient: no driving, no operating heavy machinery, no swimming alone, no climbing, no baths only showers, no  cooking alone, or any activity that would put them at increased risk of harm if they were to have a seizure.  - Medical management and supportive care per primary team. Correction of any metabolic or infectious disturbances.     Plan discussed with Attending Neurologist, please see attestation for further input/recommendations.

## 2024-11-04 NOTE — PLAN OF CARE
Problem: PAIN - ADULT  Goal: Verbalizes/displays adequate comfort level or baseline comfort level  Description: Interventions:  - Encourage patient to monitor pain and request assistance  - Assess pain using appropriate pain scale  - Administer analgesics based on type and severity of pain and evaluate response  - Implement non-pharmacological measures as appropriate and evaluate response  - Consider cultural and social influences on pain and pain management  - Notify physician/advanced practitioner if interventions unsuccessful or patient reports new pain  Outcome: Progressing     Problem: SAFETY ADULT  Goal: Patient will remain free of falls  Description: INTERVENTIONS:  - Educate patient/family on patient safety including physical limitations  - Instruct patient to call for assistance with activity   - Consult OT/PT to assist with strengthening/mobility   - Keep Call bell within reach  - Keep bed low and locked with side rails adjusted as appropriate  - Keep care items and personal belongings within reach  - Initiate and maintain comfort rounds  - Make Fall Risk Sign visible to staff  - Offer Toileting every 2 Hours, in advance of need  - Initiate/Maintain bed alarm  - Apply yellow socks and bracelet for high fall risk patients  - Consider moving patient to room near nurses station  Outcome: Progressing  Goal: Maintain or return to baseline ADL function  Description: INTERVENTIONS:  -  Assess patient's ability to carry out ADLs; assess patient's baseline for ADL function and identify physical deficits which impact ability to perform ADLs (bathing, care of mouth/teeth, toileting, grooming, dressing, etc.)  - Assess/evaluate cause of self-care deficits   - Assess range of motion  - Assess patient's mobility; develop plan if impaired  - Assess patient's need for assistive devices and provide as appropriate  - Encourage maximum independence but intervene and supervise when necessary  - Involve family in performance  of ADLs  - Assess for home care needs following discharge   - Consider OT consult to assist with ADL evaluation and planning for discharge  - Provide patient education as appropriate  Outcome: Progressing  Goal: Maintains/Returns to pre admission functional level  Description: INTERVENTIONS:  - Perform AM-PAC 6 Click Basic Mobility/ Daily Activity assessment daily.  - Set and communicate daily mobility goal to care team and patient/family/caregiver.   - Collaborate with rehabilitation services on mobility goals if consulted  - Perform Range of Motion 3 times a day.  - Reposition patient every 2 hours.  - Dangle patient 3 times a day  - Stand patient 3 times a day  - Ambulate patient 3 times a day  - Out of bed to chair 3 times a day   - Out of bed for meals 3 times a day  - Out of bed for toileting  - Record patient progress and toleration of activity level   Outcome: Progressing     Problem: DISCHARGE PLANNING  Goal: Discharge to home or other facility with appropriate resources  Description: INTERVENTIONS:  - Identify barriers to discharge w/patient and caregiver  - Arrange for needed discharge resources and transportation as appropriate  - Identify discharge learning needs (meds, wound care, etc.)  - Arrange for interpretive services to assist at discharge as needed  - Refer to Case Management Department for coordinating discharge planning if the patient needs post-hospital services based on physician/advanced practitioner order or complex needs related to functional status, cognitive ability, or social support system  Outcome: Progressing     Problem: Knowledge Deficit  Goal: Patient/family/caregiver demonstrates understanding of disease process, treatment plan, medications, and discharge instructions  Description: Complete learning assessment and assess knowledge base.  Interventions:  - Provide teaching at level of understanding  - Provide teaching via preferred learning methods  Outcome: Progressing

## 2024-11-05 VITALS
BODY MASS INDEX: 25.85 KG/M2 | TEMPERATURE: 97.7 F | SYSTOLIC BLOOD PRESSURE: 116 MMHG | RESPIRATION RATE: 18 BRPM | HEART RATE: 86 BPM | HEIGHT: 77 IN | WEIGHT: 218.92 LBS | OXYGEN SATURATION: 94 % | DIASTOLIC BLOOD PRESSURE: 80 MMHG

## 2024-11-05 LAB
ANION GAP SERPL CALCULATED.3IONS-SCNC: 6 MMOL/L (ref 4–13)
BASOPHILS # BLD AUTO: 0.05 THOUSANDS/ΜL (ref 0–0.1)
BASOPHILS NFR BLD AUTO: 1 % (ref 0–1)
BUN SERPL-MCNC: 18 MG/DL (ref 5–25)
CALCIUM SERPL-MCNC: 8.3 MG/DL (ref 8.4–10.2)
CHLORIDE SERPL-SCNC: 103 MMOL/L (ref 96–108)
CO2 SERPL-SCNC: 27 MMOL/L (ref 21–32)
CREAT SERPL-MCNC: 1 MG/DL (ref 0.6–1.3)
EOSINOPHIL # BLD AUTO: 0.14 THOUSAND/ΜL (ref 0–0.61)
EOSINOPHIL NFR BLD AUTO: 2 % (ref 0–6)
ERYTHROCYTE [DISTWIDTH] IN BLOOD BY AUTOMATED COUNT: 13.5 % (ref 11.6–15.1)
GFR SERPL CREATININE-BSD FRML MDRD: 73 ML/MIN/1.73SQ M
GLUCOSE SERPL-MCNC: 102 MG/DL (ref 65–140)
GLUCOSE SERPL-MCNC: 105 MG/DL (ref 65–140)
GLUCOSE SERPL-MCNC: 89 MG/DL (ref 65–140)
HCT VFR BLD AUTO: 38.4 % (ref 36.5–49.3)
HGB BLD-MCNC: 12.9 G/DL (ref 12–17)
IMM GRANULOCYTES # BLD AUTO: 0.04 THOUSAND/UL (ref 0–0.2)
IMM GRANULOCYTES NFR BLD AUTO: 1 % (ref 0–2)
LYMPHOCYTES # BLD AUTO: 0.58 THOUSANDS/ΜL (ref 0.6–4.47)
LYMPHOCYTES NFR BLD AUTO: 9 % (ref 14–44)
MCH RBC QN AUTO: 31.5 PG (ref 26.8–34.3)
MCHC RBC AUTO-ENTMCNC: 33.6 G/DL (ref 31.4–37.4)
MCV RBC AUTO: 94 FL (ref 82–98)
MONOCYTES # BLD AUTO: 0.58 THOUSAND/ΜL (ref 0.17–1.22)
MONOCYTES NFR BLD AUTO: 9 % (ref 4–12)
NEUTROPHILS # BLD AUTO: 5.08 THOUSANDS/ΜL (ref 1.85–7.62)
NEUTS SEG NFR BLD AUTO: 78 % (ref 43–75)
NRBC BLD AUTO-RTO: 0 /100 WBCS
PLATELET # BLD AUTO: 150 THOUSANDS/UL (ref 149–390)
PMV BLD AUTO: 9.3 FL (ref 8.9–12.7)
POTASSIUM SERPL-SCNC: 3.6 MMOL/L (ref 3.5–5.3)
RBC # BLD AUTO: 4.1 MILLION/UL (ref 3.88–5.62)
SODIUM SERPL-SCNC: 136 MMOL/L (ref 135–147)
WBC # BLD AUTO: 6.47 THOUSAND/UL (ref 4.31–10.16)

## 2024-11-05 PROCEDURE — 80048 BASIC METABOLIC PNL TOTAL CA: CPT

## 2024-11-05 PROCEDURE — 82948 REAGENT STRIP/BLOOD GLUCOSE: CPT

## 2024-11-05 PROCEDURE — 85025 COMPLETE CBC W/AUTO DIFF WBC: CPT

## 2024-11-05 RX ORDER — METOPROLOL SUCCINATE 50 MG/1
50 TABLET, EXTENDED RELEASE ORAL 2 TIMES DAILY
Qty: 60 TABLET | Refills: 0 | Status: SHIPPED | OUTPATIENT
Start: 2024-11-05

## 2024-11-05 RX ORDER — ATORVASTATIN CALCIUM 40 MG/1
40 TABLET, FILM COATED ORAL EVERY EVENING
Qty: 30 TABLET | Refills: 0 | Status: SHIPPED | OUTPATIENT
Start: 2024-11-05

## 2024-11-05 RX ORDER — CARBAMAZEPINE 400 MG/1
400 TABLET, EXTENDED RELEASE ORAL 2 TIMES DAILY
Qty: 60 TABLET | Refills: 0 | Status: SHIPPED | OUTPATIENT
Start: 2024-11-05

## 2024-11-05 RX ORDER — ASPIRIN 81 MG/1
81 TABLET, CHEWABLE ORAL DAILY
Qty: 30 TABLET | Refills: 0 | Status: SHIPPED | OUTPATIENT
Start: 2024-11-06

## 2024-11-05 RX ADMIN — ENOXAPARIN SODIUM 40 MG: 40 INJECTION SUBCUTANEOUS at 09:05

## 2024-11-05 RX ADMIN — CARBAMAZEPINE 400 MG: 100 TABLET, EXTENDED RELEASE ORAL at 09:06

## 2024-11-05 RX ADMIN — ASPIRIN 81 MG 81 MG: 81 TABLET ORAL at 09:05

## 2024-11-05 RX ADMIN — METOPROLOL SUCCINATE 50 MG: 50 TABLET, EXTENDED RELEASE ORAL at 09:05

## 2024-11-05 NOTE — ASSESSMENT & PLAN NOTE
Patient and family report that he has baseline dementia on Aricept 10mg.  No mini cog eval on file.   B12 and folate levels normal.  Aoax2 on evaluation.     Plan  Aricept 10mg dosage is confirmed by family  Pt/ot eval and case management to ensure safe discharge  OT evaluation is the patient has cognitive deficits that are unsafe for independent living. Recommends 24/7 care and OP Cognitive Therapy.

## 2024-11-05 NOTE — ASSESSMENT & PLAN NOTE
Malnutrition Findings:   Adult Malnutrition type: Chronic illness  Adult Degree of Malnutrition: Malnutrition of moderate degree  Malnutrition Characteristics: Muscle loss, Inadequate energy, Fat loss              360 Statement: Chronic/moderate malnutrition r/t condition/inadequate PO intake, as evidenced by intake meeting <75% of estimated needs for > 1 month and moderate fat loss/muscle mass depletion at buccal/temporal regions. Treatment: PO diet + oral nutrition supplement    BMI Findings:           Body mass index is 25.96 kg/m².

## 2024-11-05 NOTE — PLAN OF CARE
Problem: PAIN - ADULT  Goal: Verbalizes/displays adequate comfort level or baseline comfort level  Description: Interventions:  - Encourage patient to monitor pain and request assistance  - Assess pain using appropriate pain scale  - Administer analgesics based on type and severity of pain and evaluate response  - Implement non-pharmacological measures as appropriate and evaluate response  - Consider cultural and social influences on pain and pain management  - Notify physician/advanced practitioner if interventions unsuccessful or patient reports new pain  Outcome: Progressing     Problem: SAFETY ADULT  Goal: Patient will remain free of falls  Description: INTERVENTIONS:  - Educate patient/family on patient safety including physical limitations  - Instruct patient to call for assistance with activity   - Consult OT/PT to assist with strengthening/mobility   - Keep Call bell within reach  - Keep bed low and locked with side rails adjusted as appropriate  - Keep care items and personal belongings within reach  - Initiate and maintain comfort rounds  - Make Fall Risk Sign visible to staff  - Offer Toileting every 2 Hours, in advance of need  - Initiate/Maintain bed alarm  - Apply yellow socks and bracelet for high fall risk patients  - Consider moving patient to room near nurses station  Outcome: Progressing  Goal: Maintain or return to baseline ADL function  Description: INTERVENTIONS:  -  Assess patient's ability to carry out ADLs; assess patient's baseline for ADL function and identify physical deficits which impact ability to perform ADLs (bathing, care of mouth/teeth, toileting, grooming, dressing, etc.)  - Assess/evaluate cause of self-care deficits   - Assess range of motion  - Assess patient's mobility; develop plan if impaired  - Assess patient's need for assistive devices and provide as appropriate  - Encourage maximum independence but intervene and supervise when necessary  - Involve family in performance  of ADLs  - Assess for home care needs following discharge   - Consider OT consult to assist with ADL evaluation and planning for discharge  - Provide patient education as appropriate  Outcome: Progressing  Goal: Maintains/Returns to pre admission functional level  Description: INTERVENTIONS:  - Perform AM-PAC 6 Click Basic Mobility/ Daily Activity assessment daily.  - Set and communicate daily mobility goal to care team and patient/family/caregiver.   - Collaborate with rehabilitation services on mobility goals if consulted  - Perform Range of Motion 3 times a day.  - Reposition patient every 2 hours.  - Dangle patient 3 times a day  - Stand patient 3 times a day  - Ambulate patient 3 times a day  - Out of bed to chair 3 times a day   - Out of bed for meals 3 times a day  - Out of bed for toileting  - Record patient progress and toleration of activity level   Outcome: Progressing     Problem: DISCHARGE PLANNING  Goal: Discharge to home or other facility with appropriate resources  Description: INTERVENTIONS:  - Identify barriers to discharge w/patient and caregiver  - Arrange for needed discharge resources and transportation as appropriate  - Identify discharge learning needs (meds, wound care, etc.)  - Arrange for interpretive services to assist at discharge as needed  - Refer to Case Management Department for coordinating discharge planning if the patient needs post-hospital services based on physician/advanced practitioner order or complex needs related to functional status, cognitive ability, or social support system  Outcome: Progressing     Problem: Knowledge Deficit  Goal: Patient/family/caregiver demonstrates understanding of disease process, treatment plan, medications, and discharge instructions  Description: Complete learning assessment and assess knowledge base.  Interventions:  - Provide teaching at level of understanding  - Provide teaching via preferred learning methods  Outcome: Progressing      Problem: Nutrition/Hydration-ADULT  Goal: Nutrient/Hydration intake appropriate for improving, restoring or maintaining nutritional needs  Description: Monitor and assess patient's nutrition/hydration status for malnutrition. Collaborate with interdisciplinary team and initiate plan and interventions as ordered.  Monitor patient's weight and dietary intake as ordered or per policy. Utilize nutrition screening tool and intervene as necessary. Determine patient's food preferences and provide high-protein, high-caloric foods as appropriate.     INTERVENTIONS:  - Monitor oral intake, urinary output, labs, and treatment plans  - Assess nutrition and hydration status and recommend course of action  - Evaluate amount of meals eaten  - Assist patient with eating if necessary   - Allow adequate time for meals  - Recommend/ encourage appropriate diets, oral nutritional supplements, and vitamin/mineral supplements  - Order, calculate, and assess calorie counts as needed  - Recommend, monitor, and adjust tube feedings and TPN/PPN based on assessed needs  - Assess need for intravenous fluids  - Provide specific nutrition/hydration education as appropriate  - Include patient/family/caregiver in decisions related to nutrition  Outcome: Progressing

## 2024-11-05 NOTE — DISCHARGE SUMMARY
Discharge Summary - Hospitalist   Name: Jon Morton 74 y.o. male I MRN: [id number]  Unit/Bed#: S -01 I Date of Admission: 11/2/2024   Date of Service: 11/5/2024 I Hospital Day: 1     Assessment & Plan  Stroke (HCC)  NIH score on evaluation 11/2: 0  CT head: Lacunar type infarcts in the right thalamus and right cerebellum are new since prior exam but appear chronic on CT. These were described on the outside head CT dated 5/23/2023.   MRI Brain: 1. White matter changes suggestive of chronic microangiopathy. 2. No acute intracranial pathology. 3. Mild diffuse dural enhancement is nonspecific. There are no imaging findings of intracranial hypotension. Correlate for recent lumbar puncture or other spinal procedure.    Suspect symptoms experienced by the patient are due to Kody's paralysis caused by breakthrough seizure.  CT and MRI show no evidence of acute CVA.     Plan  - neuro consulted - they will see him OP or he will follow with his own neurologist  - Carotid duplex done - showing <50% stenosis right ICA. 50-69% stenosis left ICA.  - Echocardiogram: EF 70%, severe aortic stenosis  - PT + ot eval: OT evaluation is the patient has cognitive deficits that are unsafe for independent living. Recommends 24/7 care and OP Cognitive Therapy  - continue asprin 81mg home medication, and lipitor 40mg  - Speech and swallow eval: regular diet with thin liquids, low risk  Seizure (HCC)  Patient follows with [facility] neurology last appointment 4/13/2023.  Should be taking carBAMazepine (TEGretol) 200 mg tablet .  Patient lost to follow-up. Suspect poor compliance with medication. Hx suggests absence seizures but unlikely due to subjective family stating onset was 4 to 5 years ago.     No evidence of fall, tongue biting or loss of bladder function on exam.    Consider Seizure causes although less likely, Not hypoglycemic, hyponatremia on CMP is chronic compared to prior labs, no evidence of trauma on exam or on CT scan, no  evidence of infection    Suspect stroke symptoms above are due to todds paralysis in setting of breakthrough seizure versus medication non-compliance.     Plan   - Carbamazepine level normal, Neuro rec 400mg BID. Unsure of prior medication compliance in setting of dementia.  - seizure precautions  - TSH 5.0  - Follow with neurologist outpatient.  HTN (hypertension)  Patient has not seen a physician in greater than a year in a half. Patient has history of HTN.  Permissive hypertension while stroke workup was underway.  Home doses of medications as told by patient's legal guardian and niece, Trupti. Losartan 100mg qd, Metoprolol 100mg BID, Amlodipine 10mg BID    Plan   - Will start him on metoprolol 50mg BID here now, hold losartan and amlodipine for now  - His pressures have been well controlled thus far without his extensive home regimen.  - Questionable medication compliance as patient has baseline dementia and lives alone and manages his own medications.  Dementia (HCC)  Patient and family report that he has baseline dementia on Aricept 10mg.  No mini cog eval on file.   B12 and folate levels normal.  Aoax2 on evaluation.     Plan  Aricept 10mg dosage is confirmed by family  Pt/ot eval and case management to ensure safe discharge  OT evaluation is the patient has cognitive deficits that are unsafe for independent living. Recommends 24/7 care and OP Cognitive Therapy.  Neuropathy  - Bilateral feet  - Continue home gabapentin 300mg qhs  Moderate protein-calorie malnutrition (HCC)  Malnutrition Findings:   Adult Malnutrition type: Chronic illness  Adult Degree of Malnutrition: Malnutrition of moderate degree  Malnutrition Characteristics: Muscle loss, Inadequate energy, Fat loss              360 Statement: Chronic/moderate malnutrition r/t condition/inadequate PO intake, as evidenced by intake meeting <75% of estimated needs for > 1 month and moderate fat loss/muscle mass depletion at buccal/temporal regions.  Treatment: PO diet + oral nutrition supplement    BMI Findings:           Body mass index is 25.96 kg/m².        Medical Problems      Discharging Physician / Practitioner: Donnell Teague DO  PCP: No primary care provider on file.  Admission Date:   Admission Orders (From admission, onward)       Ordered        11/04/24 1548  INPATIENT ADMISSION  Once            11/02/24 1824  Place in Observation  Once                          Discharge Date: 11/05/24    Consultations During Hospital Stay:  Neurology    Procedures Performed:   None    Significant Findings / Test Results:   CT head wo contrast: No acute intracranial abnormality. Lacunar type infarcts in the right thalamus and right cerebellum are new since prior exam but appear chronic on CT. These were described on the outside head CT dated 5/23/2023.  11/3 MRI brain:   1. White matter changes suggestive of chronic microangiopathy.  2. No acute intracranial pathology.  3. Mild diffuse dural enhancement is nonspecific. There are no imaging findings of intracranial hypotension. Correlate for recent lumbar puncture or other spinal procedure.  11/4 Carotid duplex: There is <50% stenosis noted in the right internal carotid artery. There is 50-69% stenosis noted in the left internal carotid artery. Recommend repeat testing in 6 months as per protocol unless otherwise indicated.  11/4 Echo: left ventricular ejection fraction is 70%. Severe aortic stenosis. Mild mitral regurgitation and stenosis.    Incidental Findings:   None     Test Results Pending at Discharge (will require follow up):   None     Outpatient Tests Requested:  None    Complications:  None    Reason for Admission: Altered mental status secondary to stroke vs. seizure    Hospital Course:   Jon Morton is a 74 y.o. male patient who originally presented to the hospital on 11/2/2024 due to altered mental status, weakness and unresponsiveness. Stroke alert was called by ems.  Per report patient was  found staring off into space and unresponsive sitting in his chair on the porch by neighbors nearby. Upon evaluation in the ER patient had returned to baseline.  Patient is poor historian and lives alone by himself with dementia.  Admitted in the stroke pathway.  Workup revealed negative CT head, negative MRI brain, for acute pathology.  Negative troponins, mildly elevated TSH, CBC within normal limits, some mild hyponatremia that is chronic for this patient on BMP.  He continued to have no focal deficits while admitted.  No events on telemetry. There was some suspicion that this episode could have been brought on by a seizure while not taking his prescribed carbamazepine. Last neurology follow-up was greater than 1-1/2 years ago. Suspect non-compliance with carbamazepine causing breakthrough seizure and Kody's paralysis. Neuro increased the patient's prescribed dose of carbamazepine to 400 mg twice daily. Completed stroke workup with echocardiogram and carotid duplex study.  Echo showed EF of 70%, and severe aortic stenosis.  Carotid duplex study showed no severe stenosis (below 70%).  Patient is medically stable for discharge.  Seizure precautions outpatient-no driving, no swimming alone, no baths only showers, no cooking alone.  He was living alone prior to this event, and was advised to have 24/7 care by Occupational Therapy upon discharge due to his cognitive functioning and dementia.    Please see above list of diagnoses and related plan for additional information.     Condition at Discharge: stable    Discharge Day Visit / Exam:   Subjective: Patient is evaluated at bedside this morning.  No acute distress.  He is doing well, and unchanged from previous days.  Denies chest pain, shortness of breath, headaches, dizziness.  He is AO x 2.   Vitals: Blood Pressure: 116/80 (11/05/24 0905)  Pulse: 86 (11/05/24 0905)  Temperature: 97.7 °F (36.5 °C) (11/05/24 0735)  Temp Source: Oral (11/03/24 2014)  Respirations: 18  "(11/05/24 2310)  Height: 6' 5\" (195.6 cm) (11/04/24 1310)  Weight - Scale: 99.3 kg (218 lb 14.7 oz) (11/04/24 1310)  SpO2: 94 % (11/05/24 0904)  Physical Exam  Vitals and nursing note reviewed.   Constitutional:       General: He is not in acute distress.     Appearance: He is well-developed.   HENT:      Head: Normocephalic and atraumatic.   Eyes:      Conjunctiva/sclera: Conjunctivae normal.   Cardiovascular:      Rate and Rhythm: Normal rate and regular rhythm.   Pulmonary:      Effort: Pulmonary effort is normal. No respiratory distress.      Breath sounds: Normal breath sounds.   Abdominal:      Palpations: Abdomen is soft.      Tenderness: There is no abdominal tenderness.   Musculoskeletal:         General: No swelling.      Cervical back: Neck supple.      Right lower leg: No edema.      Left lower leg: No edema.   Skin:     General: Skin is warm and dry.      Capillary Refill: Capillary refill takes less than 2 seconds.   Neurological:      General: No focal deficit present.      Mental Status: He is alert. Mental status is at baseline.   Psychiatric:         Mood and Affect: Mood normal.          Discussion with Family: Updated  (niece) via phone.    Discharge instructions/Information to patient and family:   See after visit summary for information provided to patient and family.      Provisions for Follow-Up Care:  See after visit summary for information related to follow-up care and any pertinent home health orders.      Mobility at time of Discharge:   Basic Mobility Inpatient Raw Score: 24  JH-HLM Goal: 8: Walk 250 feet or more  JH-HLM Achieved: 7: Walk 25 feet or more  HLM Goal NOT achieved. Continue to encourage mobility in post discharge setting.     Disposition:   Home with VNA Services (Reminder: Complete face to face encounter)    Planned Readmission: No    Discharge Medications:  See after visit summary for reconciled discharge medications provided to patient and/or family.  "     **Please Note: This note may have been constructed using a voice recognition system**

## 2024-11-05 NOTE — ASSESSMENT & PLAN NOTE
Patient follows with [facility] neurology last appointment 4/13/2023.  Should be taking carBAMazepine (TEGretol) 200 mg tablet .  Patient lost to follow-up. Suspect poor compliance with medication. Hx suggests absence seizures but unlikely due to subjective family stating onset was 4 to 5 years ago.     No evidence of fall, tongue biting or loss of bladder function on exam.    Consider Seizure causes although less likely, Not hypoglycemic, hyponatremia on CMP is chronic compared to prior labs, no evidence of trauma on exam or on CT scan, no evidence of infection    Suspect stroke symptoms above are due to todds paralysis in setting of breakthrough seizure versus medication non-compliance.     Plan   - Carbamazepine level normal, Neuro rec 400mg BID. Unsure of prior medication compliance in setting of dementia.  - seizure precautions  - TSH 5.0  - Follow with neurologist outpatient.

## 2024-11-05 NOTE — ASSESSMENT & PLAN NOTE
NIH score on evaluation 11/2: 0  CT head: Lacunar type infarcts in the right thalamus and right cerebellum are new since prior exam but appear chronic on CT. These were described on the outside head CT dated 5/23/2023.   MRI Brain: 1. White matter changes suggestive of chronic microangiopathy. 2. No acute intracranial pathology. 3. Mild diffuse dural enhancement is nonspecific. There are no imaging findings of intracranial hypotension. Correlate for recent lumbar puncture or other spinal procedure.    Suspect symptoms experienced by the patient are due to Kody's paralysis caused by breakthrough seizure.  CT and MRI show no evidence of acute CVA.     Plan  - neuro consulted - they will see him OP or he will follow with his own neurologist  - Carotid duplex done - showing <50% stenosis right ICA. 50-69% stenosis left ICA.  - Echocardiogram: EF 70%, severe aortic stenosis  - PT + ot eval: OT evaluation is the patient has cognitive deficits that are unsafe for independent living. Recommends 24/7 care and OP Cognitive Therapy  - continue asprin 81mg home medication, and lipitor 40mg  - Speech and swallow eval: regular diet with thin liquids, low risk

## 2024-11-05 NOTE — DISCHARGE INSTR - AVS FIRST PAGE
Dear Jon Morton,     It was our pleasure to care for you here at [facility].  It is our hope that we were always able to exceed the expected standards for your care during your stay.  You were hospitalized due to altered mental status after, most likely, a post seizure event.  You were cared for on the Medical Surgical 3rd floor by Donnell Teague DO under the service of Graciela Kumar MD with the [facility] who covers for your primary care physician (PCP), No primary care provider on file., while you were hospitalized.  If you have any questions or concerns related to this hospitalization, you may contact us at .  For follow up as well as any medication refills, we recommend that you follow up with your primary care physician.  A registered nurse will reach out to you by phone within a few days after your discharge to answer any additional questions that you may have after going home.  However, at this time we provide for you here, the most important instructions / recommendations at discharge:     Notable Medication Adjustments -   START taking carbemazepine (Tegretol) 400mg twice per day  START taking Aspirin 81mg every day  START taking Lipitor 40mg every day  STOP Losartan 100mg  STOP Metoprolol 100mg twice per day  STOP Amlodipine 10mg twice per day  START Metoprolol 50mg twice per day  Testing Required after Discharge -   None  Important follow up information -   Please follow up with your Primary Care Physician within 1 week of discharge.  Please follow up with Neurology, referral is provided to [facility]  Other Instructions -   Please do not drive, and follow seizure precautions described by neurology.  Do consider this patient's care going forward, as there are concerns for his cognitive function. Recommendations are for him to have significant home help with his activities of daily living.   Please review  this entire after visit summary as additional general instructions including medication list, appointments, activity, diet, any pertinent wound care, and other additional recommendations from your care team that may be provided for you.      Sincerely,     Donnell Teague, DO

## 2024-11-06 ENCOUNTER — HOME CARE VISIT (OUTPATIENT)
Dept: HOME HEALTH SERVICES | Facility: HOME HEALTHCARE | Age: 74
End: 2024-11-06

## 2024-11-06 ENCOUNTER — TELEPHONE (OUTPATIENT)
Age: 74
End: 2024-11-06

## 2024-11-06 NOTE — UTILIZATION REVIEW
NOTIFICATION OF ADMISSION DISCHARGE   This is a Notification of Discharge from [facility]. Please be advised that this patient has been discharge from our facility. Below you will find the admission and discharge date and time including the patient’s disposition.   UTILIZATION REVIEW CONTACT:  Freda Ramos  Utilization   Network Utilization Review Department  Phone: [phone] x carefully listen to the prompts. All voicemails are confidential.  Email: [email or web address]     ADMISSION INFORMATION  PRESENTATION DATE: 11/2/2024  5:03 PM  OBERVATION ADMISSION DATE: 11/02/2024 1824  INPATIENT ADMISSION DATE: 11/4/24  3:48 PM   DISCHARGE DATE: 11/5/2024  4:10 PM   DISPOSITION:Home with Home Health Care    Network Utilization Review Department  ATTENTION: Please call with any questions or concerns to [phone] and carefully listen to the prompts so that you are directed to the right person. All voicemails are confidential.   For Discharge needs, contact Care Management DC Support Team at [phone] opt. 2  Send all requests for admission clinical reviews, approved or denied determinations and any other requests to dedicated fax number below belonging to the campus where the patient is receiving treatment. List of dedicated fax numbers for the Facilities:  FACILITY NAME UR FAX NUMBER   ADMISSION DENIALS (Administrative/Medical Necessity) [phone]   DISCHARGE SUPPORT TEAM ([facility]) [phone]   PARENT CHILD HEALTH (Maternity/NICU/Pediatrics) [phone]   [facility] [phone]   [facility] [phone]   [facility] [phone]   [facility] [phone]   [facility] [phone]   [facility] [phone]   [facility] [phone]   [facility]  [place] [phone]   [facility] [phone]   [facility] [phone]   [facility] [phone]   [facility] [phone]

## 2024-11-06 NOTE — TELEPHONE ENCOUNTER
1ST ATTEMPT,     Called pt no answer, RINGS RINGS THEN HANGS UP.. NO VM     Thank you,     Margo       STEVE/ JAYDEN VILLAGOMEZ/ SEIZURES     DC- HOME- 11/05/2024    ----- Message from Tori Ho PA-C sent at 11/5/2024 12:01 PM EST -----  Regarding: Hospital Follow-Up  Jon Morton will need follow-up in in 6 weeks with epilepsy team for Other= ATTENDING  in 60 minute appointment. They will not require outpatient neurological testing

## 2024-11-07 ENCOUNTER — HOME CARE VISIT (OUTPATIENT)
Dept: HOME HEALTH SERVICES | Facility: HOME HEALTHCARE | Age: 74
End: 2024-11-07
Payer: COMMERCIAL

## 2024-11-07 VITALS
HEART RATE: 89 BPM | DIASTOLIC BLOOD PRESSURE: 82 MMHG | TEMPERATURE: 97.9 F | RESPIRATION RATE: 20 BRPM | SYSTOLIC BLOOD PRESSURE: 142 MMHG | OXYGEN SATURATION: 97 %

## 2024-11-07 PROCEDURE — 400013 VN SOC

## 2024-11-07 PROCEDURE — G0299 HHS/HOSPICE OF RN EA 15 MIN: HCPCS

## 2024-11-11 ENCOUNTER — HOME CARE VISIT (OUTPATIENT)
Dept: HOME HEALTH SERVICES | Facility: HOME HEALTHCARE | Age: 74
End: 2024-11-11
Payer: COMMERCIAL

## 2024-11-11 VITALS
RESPIRATION RATE: 18 BRPM | HEART RATE: 86 BPM | SYSTOLIC BLOOD PRESSURE: 142 MMHG | OXYGEN SATURATION: 96 % | DIASTOLIC BLOOD PRESSURE: 82 MMHG | TEMPERATURE: 98.5 F

## 2024-11-11 VITALS — OXYGEN SATURATION: 96 % | SYSTOLIC BLOOD PRESSURE: 132 MMHG | HEART RATE: 86 BPM | DIASTOLIC BLOOD PRESSURE: 80 MMHG

## 2024-11-11 PROCEDURE — G0151 HHCP-SERV OF PT,EA 15 MIN: HCPCS

## 2024-11-11 PROCEDURE — G0299 HHS/HOSPICE OF RN EA 15 MIN: HCPCS

## 2024-11-13 PROCEDURE — G0180 MD CERTIFICATION HHA PATIENT: HCPCS | Performed by: FAMILY MEDICINE

## 2024-11-14 ENCOUNTER — HOME CARE VISIT (OUTPATIENT)
Dept: HOME HEALTH SERVICES | Facility: HOME HEALTHCARE | Age: 74
End: 2024-11-14
Payer: COMMERCIAL

## 2024-11-14 PROCEDURE — G0153 HHCP-SVS OF S/L PATH,EA 15MN: HCPCS

## 2024-11-14 NOTE — TELEPHONE ENCOUNTER
Hello Good Day,     Patient is in need of transportation . Are able to assist with patient Request Please?    Thank you in advance,     Margo      Scheduled with ELA Menendez, [facility], 2/7/2024, 11am and placed on waiting list.        DYLAN FROM [facility] NEUROLOGY

## 2024-11-15 NOTE — TELEPHONE ENCOUNTER
MSW phoned pt who confirmed that he does need assistance with transportation. Pt is interested in applying for [facility] transportation.   At this time, patient is active with [facility].       This writer phoned [facility]. Pt is active with them. If provider wants a SW to work with patient with transportation then order has to be placed in epic or they can also take a verbal.     DIVYA phoned [facility] at [phone] and this writer was informed that pt is not active with them.

## 2024-11-15 NOTE — TELEPHONE ENCOUNTER
DIVYA phoned [facility]   [phone]   Office of Dr. Chowdary  And requested to learn if PCP can place order with the [facility] for SW services to assist with transportation.   Order can be placed by calling  [employer] at :  [phone]

## 2024-11-18 ENCOUNTER — HOME CARE VISIT (OUTPATIENT)
Dept: HOME HEALTH SERVICES | Facility: HOME HEALTHCARE | Age: 74
End: 2024-11-18
Payer: COMMERCIAL

## 2024-11-18 PROCEDURE — G0153 HHCP-SVS OF S/L PATH,EA 15MN: HCPCS

## 2024-11-20 ENCOUNTER — HOME CARE VISIT (OUTPATIENT)
Dept: HOME HEALTH SERVICES | Facility: HOME HEALTHCARE | Age: 74
End: 2024-11-20
Payer: COMMERCIAL

## 2024-11-21 ENCOUNTER — HOME CARE VISIT (OUTPATIENT)
Dept: HOME HEALTH SERVICES | Facility: HOME HEALTHCARE | Age: 74
End: 2024-11-21
Payer: COMMERCIAL

## 2024-11-21 VITALS
TEMPERATURE: 98.3 F | SYSTOLIC BLOOD PRESSURE: 122 MMHG | HEART RATE: 86 BPM | OXYGEN SATURATION: 96 % | RESPIRATION RATE: 18 BRPM | DIASTOLIC BLOOD PRESSURE: 76 MMHG

## 2024-11-21 PROCEDURE — G0299 HHS/HOSPICE OF RN EA 15 MIN: HCPCS

## 2024-11-21 PROCEDURE — G0153 HHCP-SVS OF S/L PATH,EA 15MN: HCPCS

## 2024-11-22 ENCOUNTER — HOME CARE VISIT (OUTPATIENT)
Dept: HOME HEALTH SERVICES | Facility: HOME HEALTHCARE | Age: 74
End: 2024-11-22
Payer: COMMERCIAL

## 2024-11-25 ENCOUNTER — HOME CARE VISIT (OUTPATIENT)
Dept: HOME HEALTH SERVICES | Facility: HOME HEALTHCARE | Age: 74
End: 2024-11-25
Payer: COMMERCIAL

## 2024-11-25 PROCEDURE — G0153 HHCP-SVS OF S/L PATH,EA 15MN: HCPCS

## 2024-11-26 ENCOUNTER — HOME CARE VISIT (OUTPATIENT)
Dept: HOME HEALTH SERVICES | Facility: HOME HEALTHCARE | Age: 74
End: 2024-11-26
Payer: COMMERCIAL

## 2024-11-26 PROCEDURE — G0153 HHCP-SVS OF S/L PATH,EA 15MN: HCPCS

## 2024-11-27 ENCOUNTER — HOME CARE VISIT (OUTPATIENT)
Dept: HOME HEALTH SERVICES | Facility: HOME HEALTHCARE | Age: 74
End: 2024-11-27
Payer: COMMERCIAL

## 2024-12-02 ENCOUNTER — HOME CARE VISIT (OUTPATIENT)
Dept: HOME HEALTH SERVICES | Facility: HOME HEALTHCARE | Age: 74
End: 2024-12-02
Payer: COMMERCIAL

## 2024-12-04 DIAGNOSIS — Z86.73 CHRONIC CEREBROVASCULAR ACCIDENT (CVA): ICD-10-CM

## 2024-12-04 DIAGNOSIS — I10 PRIMARY HYPERTENSION: ICD-10-CM

## 2024-12-05 RX ORDER — METOPROLOL SUCCINATE 50 MG/1
50 TABLET, EXTENDED RELEASE ORAL 2 TIMES DAILY
Qty: 60 TABLET | Refills: 5 | Status: SHIPPED | OUTPATIENT
Start: 2024-12-05

## 2024-12-05 RX ORDER — ATORVASTATIN CALCIUM 40 MG/1
40 TABLET, FILM COATED ORAL EVERY EVENING
Qty: 30 TABLET | Refills: 5 | Status: SHIPPED | OUTPATIENT
Start: 2024-12-05

## 2024-12-10 VITALS — OXYGEN SATURATION: 98 % | RESPIRATION RATE: 16 BRPM

## 2024-12-30 ENCOUNTER — OFFICE VISIT (OUTPATIENT)
Age: 74
End: 2024-12-30
Payer: COMMERCIAL

## 2024-12-30 ENCOUNTER — TELEPHONE (OUTPATIENT)
Age: 74
End: 2024-12-30

## 2024-12-30 VITALS
DIASTOLIC BLOOD PRESSURE: 88 MMHG | WEIGHT: 190 LBS | HEIGHT: 77 IN | OXYGEN SATURATION: 98 % | SYSTOLIC BLOOD PRESSURE: 142 MMHG | BODY MASS INDEX: 22.43 KG/M2 | HEART RATE: 78 BPM

## 2024-12-30 DIAGNOSIS — G40.909 EPILEPSY (HCC): Primary | ICD-10-CM

## 2024-12-30 DIAGNOSIS — D47.2 NEUROPATHY ASSOCIATED WITH MONOCLONAL GAMMOPATHY OF UNKNOWN SIGNIFICANCE (MGUS) (HCC): ICD-10-CM

## 2024-12-30 DIAGNOSIS — Z86.73 CHRONIC CEREBROVASCULAR ACCIDENT (CVA): ICD-10-CM

## 2024-12-30 DIAGNOSIS — G63 NEUROPATHY ASSOCIATED WITH MONOCLONAL GAMMOPATHY OF UNKNOWN SIGNIFICANCE (MGUS) (HCC): ICD-10-CM

## 2024-12-30 DIAGNOSIS — G40.919 BREAKTHROUGH SEIZURE (HCC): ICD-10-CM

## 2024-12-30 DIAGNOSIS — R56.9 SEIZURE (HCC): ICD-10-CM

## 2024-12-30 PROCEDURE — 99215 OFFICE O/P EST HI 40 MIN: CPT

## 2024-12-30 RX ORDER — CARBAMAZEPINE 400 MG/1
400 TABLET, EXTENDED RELEASE ORAL 2 TIMES DAILY
Qty: 60 TABLET | Refills: 6 | Status: SHIPPED | OUTPATIENT
Start: 2024-12-30

## 2024-12-30 RX ORDER — MEMANTINE HYDROCHLORIDE 10 MG/1
1 TABLET ORAL 2 TIMES DAILY
COMMUNITY
Start: 2024-12-27

## 2024-12-30 NOTE — ASSESSMENT & PLAN NOTE
Orders:    carBAMazepine (TEGretol XR) 400 mg 12 hr tablet; Take 1 tablet (400 mg total) by mouth 2 (two) times a day

## 2024-12-30 NOTE — PROGRESS NOTES
Name: Jon Morton      : 1950      MRN: [id number]  Encounter Provider: Alva Babin MD  Encounter Date: 2024   Encounter department: [facility]  :  Assessment & Plan  Breakthrough seizure (HCC)    Orders:    carBAMazepine (TEGretol XR) 400 mg 12 hr tablet; Take 1 tablet (400 mg total) by mouth 2 (two) times a day    Epilepsy (HCC)    Orders:    Carbamazepine level, total; Future    CBC and differential; Future    Comprehensive metabolic panel; Future    EEG Routine and awake; Future    Patient is a 74 year old right handed male with PMH of stroke and epilepsy who presents for evaluation of seizures.  During his usual seizures, he stares off. He is unable to speak or understand spoken language. No shaking, denies tongue biting or urinary incontinence. He was admitted on 2024 for altered mental status, thought to be due to break through seizures as a result of carbamazepine non-compliance. His carbamezapine dosage was increased to 400mg BID. He is tolerating the increased dosage well, no side effects. Denies any seizures since hospital discharge. He dos not drive.    He takes aspirin 81m and Lipitor 40mg for history of stroke.       - Continue carbamezapine 400mg BID  - Ordered carbamezapine levels, CBC, CMP  - Ordered routine EEG  - Follow up in 4 months        History of Present Illness   HPI    Jon Morton is a 74 year old right handed male with PMH of stroke and seizures who presents for evaluation of seizures. He is here with his niece.     He started having seizures at age 59. During his usual seizures, he stares off. He is unable to speak or understand spoken language. No shaking, denies tongue biting or urinary incontinence. His seizures last less than one minute.    He was admitted on 2024 for altered mental status. CTH and MRI Brain were negative for acute intracranial changes, but showed chronic lacunar infarcts. His presentation was  thought to be due to breakthrough seizure as a result of carbamezapine non-compliance. His carbamezapine dosage was increased to 400mg BID.     Today, he denies any seizures since his discharge from his hospital. He is able to tolerate the increased dosage of carbamazepine and denies any side effects.     Used to see Dr. Michelle in [facility] - per his note, he used to have seizures of starring spells and lip/hand movements. Per chart check, seizures started October 2010 - thought to be of temporal lobe onset.    He takes aspirin 81m and Lipitor 40mg for history of stroke. He takes Namenda for dementia. He used to take keppra for seizures in the past, but it was not effective in controlling seizures.     Event/Seizure semiology:  Event type 1: Behavioral arrest, starring off  - Frequency/Date of last event: once a year, 11/2/2024  - Warning/Aura: no  - Loss of awareness: yes   - Amnestic to the event: yes   - Semiology: starring off  - Presence of post-ictal period: yes - confusion  - Sonorus breathing: no  - Incontinence: no  - Tongue biting: no    Special Features  Age/Date of seizure onset: 59  Status epilepticus: no  Self Injury Seizures: no  Precipitating Factors:  none  Longest seizure free interval: 2 years    Epilepsy Risk Factors:  Stroke    Current AEDs:  Carbamezapine   Medication side effects: None  Medication adherence: Yes    Prior AEDs:  Levetiracetam    Prior Evaluation:  - routine EEG: no  - cEEG/EMU: no  - MRI brain: yes   - PET: no  - fMRI: no  - Ictal SPECT: no  - Neuropsych evaluation: no  - Other:     History Reviewed:   The following were reviewed and updated as appropriate: allergies, current medications, past family history, past medical history, past social history, past surgical history, and problem list     Psychiatric History:  None    Social History:   Driving: No  Lives Alone: Yes  Occupation: retired    Review of Systems   Constitutional:  Negative for appetite change, fatigue and fever.  "  HENT: Negative.  Negative for hearing loss, tinnitus, trouble swallowing and voice change.    Eyes: Negative.  Negative for photophobia, pain and visual disturbance.   Respiratory: Negative.  Negative for shortness of breath.    Cardiovascular: Negative.  Negative for palpitations.   Gastrointestinal: Negative.  Negative for nausea and vomiting.   Endocrine: Positive for cold intolerance.   Genitourinary: Negative.  Negative for dysuria, frequency and urgency.   Musculoskeletal:  Negative for back pain, gait problem, myalgias, neck pain and neck stiffness.   Skin: Negative.  Negative for rash.   Allergic/Immunologic: Negative.    Neurological:  Positive for seizures and numbness. Negative for dizziness, tremors, syncope, facial asymmetry, speech difficulty, weakness, light-headedness and headaches.        Pt and POA reported no new seizures  Numbness in feet and hands occasionally    Hematological: Negative.  Does not bruise/bleed easily.   Psychiatric/Behavioral: Negative.  Negative for confusion, hallucinations and sleep disturbance.     I have personally reviewed the MA's review of systems and made changes as necessary.         Objective   There were no vitals taken for this visit.    Physical Exam  Neurological Exam    /88 (Patient Position: Sitting, Cuff Size: Large)   Pulse 78   Ht 6' 5\" (1.956 m)   Wt 86.2 kg (190 lb)   SpO2 98%   BMI 22.53 kg/m²      General Exam  General: well developed, no acute distress.  HEENT: mucous membranes moist, anicteric sclera.   Neck: supple, good ROM.      Neurological Exam  Mental Status: awake, alert, and fully oriented to person, place, time, and situation.     Language: fluency, and comprehension normal.       Cranial Nerves: Pupils equal and reactive to light.  Visual fields full to confrontation. Extraocular motions intact with full versions, normal pursuits and saccades. Facial strength full and symmetric. Facial sensation intact in V1-V3. Hearing intact to " voice. Tongue protrudes to midline. Palate elevates symmetrically. Speech clear without notable dysarthria.     Motor: Normal bulk and tone. No pronator drift. Strength is 5/5 proximally and distally in all 4 extremities. No involuntary movements.    Sensory: Sensation intact to light touch distally in all extremities.    Coordination: Normal finger-to-nose. Normal rapid alternating movements.     Station and gait: Casual and tandem gait normal. Normal Romberg.    Reflexes: Reflexes 1+ throughout and symmetric.     Radiology Results Review: I have reviewed radiology reports from   including: MRI brain.  MRI BRAIN WITH AND WITHOUT CONTRAST     INDICATION: stroke.   Facial droop     COMPARISON: CT dated 11/2/2024     TECHNIQUE:  Multiplanar, multisequence imaging of the brain was performed before and after gadolinium administration.        IV Contrast:  10 mL of Gadobutrol injection (SINGLE-DOSE)     IMAGE QUALITY:   Diagnostic.     FINDINGS:     BRAIN PARENCHYMA:  There is no discrete mass, mass effect or midline shift. There is no intracranial hemorrhage.  Normal posterior fossa.     There are remote lacunar type infarcts in the right thalamus and right cerebellar hemisphere. Diffusion imaging is unremarkable.     Small scattered hyperintensities on T2/FLAIR imaging are noted in the periventricular and subcortical white matter demonstrating an appearance that is statistically most likely to represent mild microangiopathic change.     Postcontrast imaging of the brain demonstrates mild diffuse dural enhancement without focal thickening. No abnormal brain parenchymal enhancement.     VENTRICLES:  Normal for the patient's age.     SELLA AND PITUITARY GLAND:  Normal.     ORBITS: Aphakic     PARANASAL SINUSES:  Normal.     VASCULATURE:  Evaluation of the major intracranial vasculature demonstrates appropriate flow voids.     CALVARIUM AND SKULL BASE:  Normal.     EXTRACRANIAL SOFT TISSUES:  Normal.     IMPRESSION:     1.  White matter changes suggestive of chronic microangiopathy.  2. No acute intracranial pathology.  3. Mild diffuse dural enhancement is nonspecific. There are no imaging findings of intracranial hypotension. Correlate for recent lumbar puncture or other spinal procedure.    Administrative Statements   I have spent a total time of 60 minutes in caring for this patient on the day of the visit/encounter including Diagnostic results, Prognosis, Risks and benefits of tx options, Instructions for management, Patient and family education, Importance of tx compliance, Risk factor reductions, Impressions, Counseling / Coordination of care, Documenting in the medical record, Reviewing / ordering tests, medicine, procedures  , and Obtaining or reviewing history  .

## 2024-12-30 NOTE — TELEPHONE ENCOUNTER
Call made to Trupti, advised she can bring Jon in sooner then his 10:30 am appointment on 12/30/24. She will try to come earlier. I advised if not, it is fine and we will see him around 10 :15 am.

## 2025-02-12 ENCOUNTER — TELEPHONE (OUTPATIENT)
Dept: OTHER | Facility: HOSPITAL | Age: 75
End: 2025-02-12

## 2025-02-12 PROBLEM — I35.0 SEVERE AORTIC STENOSIS: Status: ACTIVE | Noted: 2025-02-12

## 2025-02-12 NOTE — TELEPHONE ENCOUNTER
Left voicemail for patient to return call. Please transfer to cardiac surgery office for Belén or Paula.

## 2025-02-14 ENCOUNTER — HOSPITAL ENCOUNTER (INPATIENT)
Facility: HOSPITAL | Age: 75
LOS: 1 days | Discharge: HOME | DRG: 689 | End: 2025-02-16
Attending: EMERGENCY MEDICINE | Admitting: INTERNAL MEDICINE
Payer: COMMERCIAL

## 2025-02-14 ENCOUNTER — APPOINTMENT (EMERGENCY)
Dept: CT IMAGING | Facility: HOSPITAL | Age: 75
DRG: 689 | End: 2025-02-14
Payer: COMMERCIAL

## 2025-02-14 DIAGNOSIS — N32.89 BLADDER MASS: ICD-10-CM

## 2025-02-14 DIAGNOSIS — N39.0 UTI (URINARY TRACT INFECTION): Primary | ICD-10-CM

## 2025-02-14 DIAGNOSIS — R44.1 VISUAL HALLUCINATIONS: ICD-10-CM

## 2025-02-14 LAB
ALBUMIN SERPL BCG-MCNC: 4.1 G/DL (ref 3.5–5)
ALP SERPL-CCNC: 88 U/L (ref 34–104)
ALT SERPL W P-5'-P-CCNC: 23 U/L (ref 7–52)
ANION GAP SERPL CALCULATED.3IONS-SCNC: 11 MMOL/L (ref 4–13)
AST SERPL W P-5'-P-CCNC: 36 U/L (ref 13–39)
BASOPHILS # BLD AUTO: 0.04 THOUSANDS/ΜL (ref 0–0.1)
BASOPHILS NFR BLD AUTO: 1 % (ref 0–1)
BILIRUB SERPL-MCNC: 0.68 MG/DL (ref 0.2–1)
BILIRUB UR QL STRIP: NEGATIVE
BUN SERPL-MCNC: 35 MG/DL (ref 5–25)
CALCIUM SERPL-MCNC: 9.6 MG/DL (ref 8.4–10.2)
CHLORIDE SERPL-SCNC: 107 MMOL/L (ref 96–108)
CLARITY UR: ABNORMAL
CO2 SERPL-SCNC: 25 MMOL/L (ref 21–32)
COLOR UR: ABNORMAL
CREAT SERPL-MCNC: 1.4 MG/DL (ref 0.6–1.3)
EOSINOPHIL # BLD AUTO: 0.02 THOUSAND/ΜL (ref 0–0.61)
EOSINOPHIL NFR BLD AUTO: 0 % (ref 0–6)
ERYTHROCYTE [DISTWIDTH] IN BLOOD BY AUTOMATED COUNT: 13.3 % (ref 11.6–15.1)
GFR SERPL CREATININE-BSD FRML MDRD: 48 ML/MIN/1.73SQ M
GLUCOSE SERPL-MCNC: 157 MG/DL (ref 65–140)
GLUCOSE UR STRIP-MCNC: NEGATIVE MG/DL
HCT VFR BLD AUTO: 40.7 % (ref 36.5–49.3)
HGB BLD-MCNC: 13.3 G/DL (ref 12–17)
HGB UR QL STRIP.AUTO: ABNORMAL
IMM GRANULOCYTES # BLD AUTO: 0.04 THOUSAND/UL (ref 0–0.2)
IMM GRANULOCYTES NFR BLD AUTO: 1 % (ref 0–2)
KETONES UR STRIP-MCNC: NEGATIVE MG/DL
LEUKOCYTE ESTERASE UR QL STRIP: NEGATIVE
LYMPHOCYTES # BLD AUTO: 0.58 THOUSANDS/ΜL (ref 0.6–4.47)
LYMPHOCYTES NFR BLD AUTO: 7 % (ref 14–44)
MCH RBC QN AUTO: 30.9 PG (ref 26.8–34.3)
MCHC RBC AUTO-ENTMCNC: 32.7 G/DL (ref 31.4–37.4)
MCV RBC AUTO: 95 FL (ref 82–98)
MONOCYTES # BLD AUTO: 0.44 THOUSAND/ΜL (ref 0.17–1.22)
MONOCYTES NFR BLD AUTO: 5 % (ref 4–12)
NEUTROPHILS # BLD AUTO: 7.13 THOUSANDS/ΜL (ref 1.85–7.62)
NEUTS SEG NFR BLD AUTO: 86 % (ref 43–75)
NITRITE UR QL STRIP: POSITIVE
NRBC BLD AUTO-RTO: 0 /100 WBCS
PH UR STRIP.AUTO: 6 [PH]
PLATELET # BLD AUTO: 183 THOUSANDS/UL (ref 149–390)
PMV BLD AUTO: 9.4 FL (ref 8.9–12.7)
POTASSIUM SERPL-SCNC: 3.6 MMOL/L (ref 3.5–5.3)
PROT SERPL-MCNC: 7.1 G/DL (ref 6.4–8.4)
PROT UR STRIP-MCNC: ABNORMAL MG/DL
RBC # BLD AUTO: 4.3 MILLION/UL (ref 3.88–5.62)
SODIUM SERPL-SCNC: 143 MMOL/L (ref 135–147)
SP GR UR STRIP.AUTO: 1.02 (ref 1–1.03)
TSH SERPL DL<=0.05 MIU/L-ACNC: 6.98 UIU/ML (ref 0.45–4.5)
UROBILINOGEN UR QL STRIP.AUTO: 0.2 E.U./DL
WBC # BLD AUTO: 8.25 THOUSAND/UL (ref 4.31–10.16)

## 2025-02-14 PROCEDURE — 99285 EMERGENCY DEPT VISIT HI MDM: CPT

## 2025-02-14 PROCEDURE — 74177 CT ABD & PELVIS W/CONTRAST: CPT

## 2025-02-14 PROCEDURE — 70450 CT HEAD/BRAIN W/O DYE: CPT

## 2025-02-14 PROCEDURE — 36415 COLL VENOUS BLD VENIPUNCTURE: CPT | Performed by: EMERGENCY MEDICINE

## 2025-02-14 PROCEDURE — 84443 ASSAY THYROID STIM HORMONE: CPT | Performed by: EMERGENCY MEDICINE

## 2025-02-14 PROCEDURE — 87086 URINE CULTURE/COLONY COUNT: CPT | Performed by: EMERGENCY MEDICINE

## 2025-02-14 PROCEDURE — 80053 COMPREHEN METABOLIC PANEL: CPT | Performed by: EMERGENCY MEDICINE

## 2025-02-14 PROCEDURE — 99285 EMERGENCY DEPT VISIT HI MDM: CPT | Performed by: EMERGENCY MEDICINE

## 2025-02-14 PROCEDURE — 84439 ASSAY OF FREE THYROXINE: CPT | Performed by: EMERGENCY MEDICINE

## 2025-02-14 PROCEDURE — 96360 HYDRATION IV INFUSION INIT: CPT

## 2025-02-14 PROCEDURE — 81001 URINALYSIS AUTO W/SCOPE: CPT | Performed by: EMERGENCY MEDICINE

## 2025-02-14 PROCEDURE — 85025 COMPLETE CBC W/AUTO DIFF WBC: CPT | Performed by: EMERGENCY MEDICINE

## 2025-02-14 RX ADMIN — IOHEXOL 100 ML: 350 INJECTION, SOLUTION INTRAVENOUS at 22:46

## 2025-02-14 RX ADMIN — SODIUM CHLORIDE 1000 ML: 0.9 INJECTION, SOLUTION INTRAVENOUS at 22:20

## 2025-02-15 PROBLEM — N32.89 BLADDER MASS: Status: ACTIVE | Noted: 2025-02-15

## 2025-02-15 PROBLEM — R31.9 HEMATURIA: Status: ACTIVE | Noted: 2025-02-15

## 2025-02-15 PROBLEM — N39.0 UTI (URINARY TRACT INFECTION): Status: ACTIVE | Noted: 2025-02-15

## 2025-02-15 PROBLEM — R44.3 HALLUCINATION: Status: ACTIVE | Noted: 2025-02-15

## 2025-02-15 LAB
ALBUMIN SERPL BCG-MCNC: 3.7 G/DL (ref 3.5–5)
ALP SERPL-CCNC: 88 U/L (ref 34–104)
ALT SERPL W P-5'-P-CCNC: 21 U/L (ref 7–52)
AMORPH URATE CRY URNS QL MICRO: ABNORMAL /HPF
ANION GAP SERPL CALCULATED.3IONS-SCNC: 7 MMOL/L (ref 4–13)
AST SERPL W P-5'-P-CCNC: 32 U/L (ref 13–39)
BACTERIA UR QL AUTO: ABNORMAL /HPF
BASOPHILS # BLD AUTO: 0.06 THOUSANDS/ΜL (ref 0–0.1)
BASOPHILS NFR BLD AUTO: 1 % (ref 0–1)
BILIRUB SERPL-MCNC: 0.53 MG/DL (ref 0.2–1)
BUN SERPL-MCNC: 30 MG/DL (ref 5–25)
CALCIUM SERPL-MCNC: 9.1 MG/DL (ref 8.4–10.2)
CHLORIDE SERPL-SCNC: 111 MMOL/L (ref 96–108)
CO2 SERPL-SCNC: 26 MMOL/L (ref 21–32)
CREAT SERPL-MCNC: 1.11 MG/DL (ref 0.6–1.3)
EOSINOPHIL # BLD AUTO: 0.05 THOUSAND/ΜL (ref 0–0.61)
EOSINOPHIL NFR BLD AUTO: 1 % (ref 0–6)
ERYTHROCYTE [DISTWIDTH] IN BLOOD BY AUTOMATED COUNT: 13.2 % (ref 11.6–15.1)
GFR SERPL CREATININE-BSD FRML MDRD: 64 ML/MIN/1.73SQ M
GLUCOSE P FAST SERPL-MCNC: 95 MG/DL (ref 65–99)
GLUCOSE SERPL-MCNC: 95 MG/DL (ref 65–140)
HCT VFR BLD AUTO: 39.3 % (ref 36.5–49.3)
HGB BLD-MCNC: 12.5 G/DL (ref 12–17)
HYALINE CASTS #/AREA URNS LPF: ABNORMAL /LPF
IMM GRANULOCYTES # BLD AUTO: 0.02 THOUSAND/UL (ref 0–0.2)
IMM GRANULOCYTES NFR BLD AUTO: 0 % (ref 0–2)
LYMPHOCYTES # BLD AUTO: 0.69 THOUSANDS/ΜL (ref 0.6–4.47)
LYMPHOCYTES NFR BLD AUTO: 12 % (ref 14–44)
MAGNESIUM SERPL-MCNC: 2.1 MG/DL (ref 1.9–2.7)
MCH RBC QN AUTO: 31.1 PG (ref 26.8–34.3)
MCHC RBC AUTO-ENTMCNC: 31.8 G/DL (ref 31.4–37.4)
MCV RBC AUTO: 98 FL (ref 82–98)
MONOCYTES # BLD AUTO: 0.52 THOUSAND/ΜL (ref 0.17–1.22)
MONOCYTES NFR BLD AUTO: 9 % (ref 4–12)
NEUTROPHILS # BLD AUTO: 4.28 THOUSANDS/ΜL (ref 1.85–7.62)
NEUTS SEG NFR BLD AUTO: 77 % (ref 43–75)
NON-SQ EPI CELLS URNS QL MICRO: ABNORMAL /HPF
NRBC BLD AUTO-RTO: 0 /100 WBCS
PHOSPHATE SERPL-MCNC: 3.7 MG/DL (ref 2.3–4.1)
PLATELET # BLD AUTO: 151 THOUSANDS/UL (ref 149–390)
PMV BLD AUTO: 9.5 FL (ref 8.9–12.7)
POTASSIUM SERPL-SCNC: 4.4 MMOL/L (ref 3.5–5.3)
PROT SERPL-MCNC: 6.4 G/DL (ref 6.4–8.4)
RBC # BLD AUTO: 4.02 MILLION/UL (ref 3.88–5.62)
RBC #/AREA URNS AUTO: ABNORMAL /HPF
SODIUM SERPL-SCNC: 144 MMOL/L (ref 135–147)
T4 FREE SERPL-MCNC: 0.96 NG/DL (ref 0.61–1.12)
VIT B12 SERPL-MCNC: 551 PG/ML (ref 180–914)
WBC # BLD AUTO: 5.62 THOUSAND/UL (ref 4.31–10.16)
WBC #/AREA URNS AUTO: ABNORMAL /HPF

## 2025-02-15 PROCEDURE — 80053 COMPREHEN METABOLIC PANEL: CPT

## 2025-02-15 PROCEDURE — 36415 COLL VENOUS BLD VENIPUNCTURE: CPT

## 2025-02-15 PROCEDURE — 84100 ASSAY OF PHOSPHORUS: CPT

## 2025-02-15 PROCEDURE — 99222 1ST HOSP IP/OBS MODERATE 55: CPT | Performed by: INTERNAL MEDICINE

## 2025-02-15 PROCEDURE — 99223 1ST HOSP IP/OBS HIGH 75: CPT

## 2025-02-15 PROCEDURE — 85025 COMPLETE CBC W/AUTO DIFF WBC: CPT

## 2025-02-15 PROCEDURE — 82607 VITAMIN B-12: CPT | Performed by: INTERNAL MEDICINE

## 2025-02-15 PROCEDURE — 83735 ASSAY OF MAGNESIUM: CPT

## 2025-02-15 RX ORDER — DONEPEZIL HYDROCHLORIDE 5 MG/1
TABLET, FILM COATED ORAL
Status: DISPENSED
Start: 2025-02-15 | End: 2025-02-15

## 2025-02-15 RX ORDER — ASPIRIN 81 MG/1
81 TABLET, CHEWABLE ORAL DAILY
Status: DISCONTINUED | OUTPATIENT
Start: 2025-02-15 | End: 2025-02-16 | Stop reason: HOSPADM

## 2025-02-15 RX ORDER — SODIUM CHLORIDE, SODIUM GLUCONATE, SODIUM ACETATE, POTASSIUM CHLORIDE, MAGNESIUM CHLORIDE, SODIUM PHOSPHATE, DIBASIC, AND POTASSIUM PHOSPHATE .53; .5; .37; .037; .03; .012; .00082 G/100ML; G/100ML; G/100ML; G/100ML; G/100ML; G/100ML; G/100ML
100 INJECTION, SOLUTION INTRAVENOUS CONTINUOUS
Status: DISCONTINUED | OUTPATIENT
Start: 2025-02-15 | End: 2025-02-15

## 2025-02-15 RX ORDER — CARBAMAZEPINE 100 MG/1
400 TABLET, EXTENDED RELEASE ORAL 2 TIMES DAILY
Status: DISCONTINUED | OUTPATIENT
Start: 2025-02-15 | End: 2025-02-16 | Stop reason: HOSPADM

## 2025-02-15 RX ORDER — GABAPENTIN 300 MG/1
300 CAPSULE ORAL
Status: DISCONTINUED | OUTPATIENT
Start: 2025-02-15 | End: 2025-02-15

## 2025-02-15 RX ORDER — CEFTRIAXONE 1 G/50ML
1000 INJECTION, SOLUTION INTRAVENOUS ONCE
Status: COMPLETED | OUTPATIENT
Start: 2025-02-15 | End: 2025-02-15

## 2025-02-15 RX ORDER — ATORVASTATIN CALCIUM 40 MG/1
40 TABLET, FILM COATED ORAL EVERY EVENING
Status: DISCONTINUED | OUTPATIENT
Start: 2025-02-15 | End: 2025-02-16 | Stop reason: HOSPADM

## 2025-02-15 RX ORDER — METOPROLOL SUCCINATE 50 MG/1
50 TABLET, EXTENDED RELEASE ORAL 2 TIMES DAILY
Status: DISCONTINUED | OUTPATIENT
Start: 2025-02-15 | End: 2025-02-16 | Stop reason: HOSPADM

## 2025-02-15 RX ORDER — GABAPENTIN 400 MG/1
400 CAPSULE ORAL
Status: DISCONTINUED | OUTPATIENT
Start: 2025-02-16 | End: 2025-02-16 | Stop reason: HOSPADM

## 2025-02-15 RX ORDER — CEFTRIAXONE 1 G/50ML
1000 INJECTION, SOLUTION INTRAVENOUS EVERY 24 HOURS
Status: DISCONTINUED | OUTPATIENT
Start: 2025-02-16 | End: 2025-02-16 | Stop reason: HOSPADM

## 2025-02-15 RX ORDER — CYANOCOBALAMIN 1000 UG/ML
1000 INJECTION, SOLUTION INTRAMUSCULAR; SUBCUTANEOUS ONCE
Status: COMPLETED | OUTPATIENT
Start: 2025-02-15 | End: 2025-02-15

## 2025-02-15 RX ORDER — MEMANTINE HYDROCHLORIDE 5 MG/1
10 TABLET ORAL 2 TIMES DAILY
Status: DISCONTINUED | OUTPATIENT
Start: 2025-02-15 | End: 2025-02-16 | Stop reason: HOSPADM

## 2025-02-15 RX ORDER — DONEPEZIL HYDROCHLORIDE 5 MG/1
10 TABLET, FILM COATED ORAL
Status: DISCONTINUED | OUTPATIENT
Start: 2025-02-15 | End: 2025-02-16 | Stop reason: HOSPADM

## 2025-02-15 RX ORDER — GABAPENTIN 400 MG/1
400 CAPSULE ORAL
Status: DISCONTINUED | OUTPATIENT
Start: 2025-02-15 | End: 2025-02-15

## 2025-02-15 RX ADMIN — MEMANTINE 10 MG: 5 TABLET ORAL at 17:57

## 2025-02-15 RX ADMIN — CYANOCOBALAMIN 1000 MCG: 1000 INJECTION, SOLUTION INTRAMUSCULAR; SUBCUTANEOUS at 17:57

## 2025-02-15 RX ADMIN — MEMANTINE 10 MG: 5 TABLET ORAL at 05:36

## 2025-02-15 RX ADMIN — METOPROLOL SUCCINATE 50 MG: 50 TABLET, EXTENDED RELEASE ORAL at 08:31

## 2025-02-15 RX ADMIN — METOPROLOL SUCCINATE 50 MG: 50 TABLET, EXTENDED RELEASE ORAL at 17:54

## 2025-02-15 RX ADMIN — ATORVASTATIN CALCIUM 40 MG: 40 TABLET, FILM COATED ORAL at 17:55

## 2025-02-15 RX ADMIN — CARBAMAZEPINE 400 MG: 100 TABLET, EXTENDED RELEASE ORAL at 17:55

## 2025-02-15 RX ADMIN — DONEPEZIL HYDROCHLORIDE 10 MG: 5 TABLET ORAL at 05:36

## 2025-02-15 RX ADMIN — CEFTRIAXONE 1000 MG: 1 INJECTION, SOLUTION INTRAVENOUS at 01:10

## 2025-02-15 RX ADMIN — SODIUM CHLORIDE, SODIUM GLUCONATE, SODIUM ACETATE, POTASSIUM CHLORIDE, MAGNESIUM CHLORIDE, SODIUM PHOSPHATE, DIBASIC, AND POTASSIUM PHOSPHATE 100 ML/HR: .53; .5; .37; .037; .03; .012; .00082 INJECTION, SOLUTION INTRAVENOUS at 03:47

## 2025-02-15 RX ADMIN — GABAPENTIN 300 MG: 300 CAPSULE ORAL at 21:36

## 2025-02-15 RX ADMIN — DONEPEZIL HYDROCHLORIDE 10 MG: 5 TABLET ORAL at 21:36

## 2025-02-15 RX ADMIN — CARBAMAZEPINE 400 MG: 100 TABLET, EXTENDED RELEASE ORAL at 08:30

## 2025-02-15 RX ADMIN — ASPIRIN 81 MG 81 MG: 81 TABLET ORAL at 08:32

## 2025-02-15 NOTE — H&P
H&P - Hospitalist   Name: Jon Morton 74 y.o. male I MRN: [id number]  Unit/Bed#: ED 12 I Date of Admission: 2/14/2025   Date of Service: 2/15/2025 I Hospital Day: 0     Assessment & Plan  Hematuria  Patient with past medical history of bladder cancer treated with mitramycin and resection according to the notes  Patient presents hematuria for the last few days  CT abdomen pelvis showed 1.4 cm partially calcified mucosal soft tissue lesion along the right bladder wall concerning for neoplasm.  Urology recommended admission for treatment of UTI and probably outpatient follow-up with urology  Plan  Urology consulted appreciate recommendations  Bladder scan  Retention protocol  Continue to monitor hemoglobin  UTI (urinary tract infection)  Patient presented with UA positive for leukocytes  However that can be in the setting of hematuria  Will treat as UTI due to altered mental status  Continue ceftriaxone  Continue IV fluid  Follow-up on urine culture  Stroke (HCC)  History of stroke with no residual deficits  Continue aspirin and atorvastatin  Seizure (HCC)  History of seizures  Continue carbamazepine  HTN (hypertension)  Past medical history of hypertension  Continue metoprolol  Dementia (HCC)  History of dementia  Patient lives independently however the niece is taking care of his meds  Continue donepezil and memantine  Bladder mass  Patient with past medical history of bladder cancer  CT abdomen pelvis showed 1.4 cm partially calcified mucosal soft tissue lesion along the right bladder wall concerning for neoplasm.  Urology consult appreciate recommendations  Hallucination  Patient present with visual hallucination  Might be in the setting of infection versus advanced dementia  Patient is stable on exam without hallucination  Continue to monitor  Holding gabapentin      VTE Pharmacologic Prophylaxis:   Moderate Risk (Score 3-4) - Pharmacological DVT Prophylaxis Contraindicated. Sequential Compression Devices  Ordered.  Code Status: Level 3 - DNAR and DNI   Discussion with family: Updated  (niece) at bedside.    Anticipated Length of Stay: Patient will be admitted on an observation basis with an anticipated length of stay of less than 2 midnights secondary to hematuria and UTI.    History of Present Illness   Chief Complaint: Hematuria and hallucination    Jon Morton is a 74 y.o. male with a PMH of bladder cancer, hypertension, hyperlipidemia, dementia, CVA, seizures who presents with altered mental status, hallucinations and hematuria.  Per patient's niece who takes care of him she reported that the patient started to have visual hallucination today which is unusual for him.  Also patient was complaining of hematuria for the last few days.  In the ED vital signs blood pressure 133/76, respiration 18, heart rate 94, temperature 98.3.  Labs creatinine 1.4 from baseline 1.  CT head with no abnormality.  CT abdomen pelvis showed 1.4 cm partially calcified mucosal soft tissue lesion along the right bladder wall concerning for neoplasm.  Patient is level 3 per niece.    Review of Systems   Constitutional:  Positive for activity change. Negative for chills, diaphoresis and fatigue.   HENT:  Negative for dental problem, facial swelling, nosebleeds and tinnitus.    Eyes:  Negative for pain, redness, itching and visual disturbance.   Respiratory:  Negative for apnea, cough, chest tightness and stridor.    Cardiovascular:  Negative for chest pain and palpitations.   Gastrointestinal:  Negative for anal bleeding, constipation and vomiting.   Endocrine: Negative for polydipsia and polyuria.   Genitourinary:  Positive for hematuria. Negative for difficulty urinating, dysuria, flank pain, frequency, scrotal swelling and testicular pain.   Musculoskeletal:  Negative for arthralgias, back pain, joint swelling, myalgias and neck stiffness.   Neurological:  Negative for speech difficulty, weakness, numbness and  headaches.   Psychiatric/Behavioral:  Positive for hallucinations. Negative for agitation, behavioral problems, decreased concentration, dysphoric mood, self-injury, sleep disturbance and suicidal ideas.        Historical Information   Past Medical History:   Diagnosis Date    Hx of bladder cancer     Hypercholesterolemia     Hypertension     Memory difficulties     Neuropathy     Seizures (HCC)     Stroke (HCC)      Past Surgical History:   Procedure Laterality Date    BACK SURGERY      DENTAL SURGERY       Social History     Tobacco Use    Smoking status: Former     Current packs/day: 0.00     Types: Cigarettes     Quit date:      Years since quittin.1     Passive exposure: Past    Smokeless tobacco: Never   Vaping Use    Vaping status: Never Used   Substance and Sexual Activity    Alcohol use: Yes     Comment: Rare    Drug use: Never    Sexual activity: Not Currently     E-Cigarette/Vaping    E-Cigarette Use Never User      E-Cigarette/Vaping Substances    Nicotine No     THC No     CBD No     Flavoring No     Other No     Unknown No      Family history non-contributory  Social History:  Marital Status: Unknown     Meds/Allergies   I have reviewed home medications with patient personally.  Prior to Admission medications    Medication Sig Start Date End Date Taking? Authorizing Provider   aspirin 81 mg chewable tablet Chew 1 tablet (81 mg total) daily 24   Donnell Teague DO   atorvastatin (LIPITOR) 40 mg tablet TAKE 1 TABLET (40 MG TOTAL) BY MOUTH EVERY EVENING 24   Donnell Teague DO   carBAMazepine (TEGretol XR) 400 mg 12 hr tablet Take 1 tablet (400 mg total) by mouth 2 (two) times a day 24   Alva Babin MD   donepezil (ARICEPT) 10 mg tablet Take 10 mg by mouth daily at bedtime 1 tab Daily  10/7/24   Historical Provider, MD   gabapentin (NEURONTIN) 300 mg capsule Take 300 mg by mouth daily at bedtime 1 cap nightly 10/7/24   Historical Provider, MD   memantine (NAMENDA)  10 mg tablet Take 1 tablet by mouth 2 (two) times a day 12/27/24   Historical Provider, MD   metoprolol succinate (TOPROL-XL) 50 mg 24 hr tablet TAKE 1 TABLET (50 MG TOTAL) BY MOUTH 2 (TWO) TIMES A DAY 12/5/24   Donnell Teague, DO     No Known Allergies    Objective :  Temp:  [98.3 °F (36.8 °C)] 98.3 °F (36.8 °C)  HR:  [67-94] 67  BP: (133-144)/(76-86) 144/86  Resp:  [18] 18  SpO2:  [96 %-97 %] 97 %  O2 Device: None (Room air)    Physical Exam  Constitutional:       General: He is not in acute distress.     Appearance: He is not ill-appearing, toxic-appearing or diaphoretic.   HENT:      Head: Normocephalic and atraumatic.   Eyes:      General: No scleral icterus.        Right eye: No discharge.         Left eye: No discharge.      Pupils: Pupils are equal, round, and reactive to light.   Cardiovascular:      Rate and Rhythm: Normal rate and regular rhythm.      Pulses: Normal pulses.      Heart sounds: No murmur heard.     No friction rub. No gallop.   Pulmonary:      Effort: Pulmonary effort is normal. No respiratory distress.      Breath sounds: No stridor. No wheezing or rhonchi.   Abdominal:      General: Abdomen is flat. There is no distension.      Palpations: There is no mass.      Tenderness: There is no abdominal tenderness. There is no guarding or rebound.      Hernia: No hernia is present.   Musculoskeletal:         General: No swelling, tenderness, deformity or signs of injury. Normal range of motion.   Skin:     General: Skin is warm.      Coloration: Skin is not jaundiced or pale.      Findings: No bruising or erythema.   Neurological:      General: No focal deficit present.      Mental Status: He is alert.      Cranial Nerves: No cranial nerve deficit.      Sensory: No sensory deficit.      Motor: No weakness.      Coordination: Coordination normal.   Psychiatric:         Mood and Affect: Mood normal.         Behavior: Behavior normal.         Thought Content: Thought content normal.          Judgment: Judgment normal.               Lab Results: I have reviewed the following results:  Results from last 7 days   Lab Units 02/14/25  2148   WBC Thousand/uL 8.25   HEMOGLOBIN g/dL 13.3   HEMATOCRIT % 40.7   PLATELETS Thousands/uL 183   SEGS PCT % 86*   LYMPHO PCT % 7*   MONO PCT % 5   EOS PCT % 0     Results from last 7 days   Lab Units 02/14/25  2148   SODIUM mmol/L 143   POTASSIUM mmol/L 3.6   CHLORIDE mmol/L 107   CO2 mmol/L 25   BUN mg/dL 35*   CREATININE mg/dL 1.40*   ANION GAP mmol/L 11   CALCIUM mg/dL 9.6   ALBUMIN g/dL 4.1   TOTAL BILIRUBIN mg/dL 0.68   ALK PHOS U/L 88   ALT U/L 23   AST U/L 36   GLUCOSE RANDOM mg/dL 157*             Lab Results   Component Value Date    HGBA1C 5.3 11/03/2024           Imaging Results Review: I reviewed radiology reports from this admission including: CT abdomen/pelvis.  Other Study Results Review: EKG was reviewed.     Administrative Statements   I have spent a total time of 60 minutes in caring for this patient on the day of the visit/encounter including Diagnostic results, Prognosis, and Risks and benefits of tx options.    ** Please Note: This note has been constructed using a voice recognition system. **

## 2025-02-15 NOTE — ASSESSMENT & PLAN NOTE
Patient present with visual hallucination  Might be in the setting of infection versus advanced dementia  Patient is stable on exam without hallucination  Continue to monitor  Holding gabapentin

## 2025-02-15 NOTE — ASSESSMENT & PLAN NOTE
History of dementia  Patient lives independently however the niece is taking care of his meds  Continue donepezil and memantine

## 2025-02-15 NOTE — ASSESSMENT & PLAN NOTE
Patient presented with UA positive for leukocytes  However that can be in the setting of hematuria  Will treat as UTI due to altered mental status  Continue ceftriaxone  Continue IV fluid  Follow-up on urine culture

## 2025-02-15 NOTE — ASSESSMENT & PLAN NOTE
Patient with past medical history of bladder cancer treated with mitramycin and resection according to the notes  Patient presents hematuria for the last few days  CT abdomen pelvis showed 1.4 cm partially calcified mucosal soft tissue lesion along the right bladder wall concerning for neoplasm.  Urology recommended admission for treatment of UTI and probably outpatient follow-up with urology  Plan  Urology consulted appreciate recommendations  Bladder scan  Retention protocol  Continue to monitor hemoglobin

## 2025-02-15 NOTE — ED PROVIDER NOTES
Time reflects when diagnosis was documented in both MDM as applicable and the Disposition within this note       Time User Action Codes Description Comment    2/15/2025 12:54 AM [place]ía Elena Add [N39.0] UTI (urinary tract infection)     2/15/2025 12:54 AM [place] María Elena Add [N32.89] Bladder mass     2/15/2025 12:54 AM Joan Salvadorie Add [R44.1] Visual hallucinations           ED Disposition       ED Disposition   Admit    Condition   Stable    Date/Time   Sat Feb 15, 2025 12:54 AM    Comment   Case was discussed with JOURDAN and the patient's admission status was agreed to be Admission Status: observation status to the service of Dr. Ellis .               Assessment & Plan       Medical Decision Making  74-year-old male presenting for evaluation of hematuria, visual hallucinations.  Patient is at his baseline mental status at the time of evaluation but has been having difficulty today with his normal ADLs due to increased forgetfulness.  No focal neurologic deficits.  Otherwise benign examination.  Differential diagnoses include but not limited to AYAZ, UTI, intracranial hemorrhage, intracranial mass, bladder mass.  Labs notable for slightly elevated creatinine but not enough to constitute an AYAZ.  UA consistent with UTI.  Dosed with ceftriaxone.  CT head unremarkable.  CT abdomen pelvis showing a possible bladder mass.  Discussed with Jourdan admitted to their service for further evaluation management.    Problems Addressed:  Bladder mass: acute illness or injury  UTI (urinary tract infection): acute illness or injury  Visual hallucinations: acute illness or injury    Amount and/or Complexity of Data Reviewed  Labs: ordered. Decision-making details documented in ED Course.  Radiology: ordered.    Risk  Prescription drug management.  Decision regarding hospitalization.        ED Course as of 02/15/25 0056   Fri Feb 14, 2025 2155 CBC and differential(!)   Sat Feb 15, 2025   0052 Discussed with Fanta Noland from  "urology: \"Just looking at his chart I do not think that he would require urgent surgical intervention/cystoscopy.  Sounds like his urine is currently mild per documentation in chart is pool in color.  Hemoglobin stable at 13.  I am okay with him staying on site with treatment of UTI.  And we can collect serial urine collections to see how his urine is looking.  His bladder seemed pretty distended on imaging.  I would recommend serial PVRs as well to ensure he does not require any catheterization.  Bladder stones on imaging also giving us a sign that he may not void very well.  Urology will manage remotely. If he is improving medically, hematuria mild or resolved okay with outpatient follow-up for cystoscopy.  I can place a task to see if I can expedite him.\"       Medications   cefTRIAXone (ROCEPHIN) IVPB (premix in dextrose) 1,000 mg 50 mL (has no administration in time range)   sodium chloride 0.9 % bolus 1,000 mL (1,000 mL Intravenous New Bag 2/14/25 2220)   iohexol (OMNIPAQUE) 350 MG/ML injection (SINGLE-DOSE) 100 mL (100 mL Intravenous Given 2/14/25 2246)       ED Risk Strat Scores                            SBIRT 22yo+      Flowsheet Row Most Recent Value   Initial Alcohol Screen: US AUDIT-C     1. How often do you have a drink containing alcohol? 0 Filed at: 02/14/2025 2120   2. How many drinks containing alcohol do you have on a typical day you are drinking?  0 Filed at: 02/14/2025 2120   3b. FEMALE Any Age, or MALE 65+: How often do you have 4 or more drinks on one occassion? 0 Filed at: 02/14/2025 2120   Audit-C Score 0 Filed at: 02/14/2025 2120   KENJI: How many times in the past year have you...    Used an illegal drug or used a prescription medication for non-medical reasons? Never Filed at: 02/14/2025 2120                            History of Present Illness       Chief Complaint   Patient presents with    Blood in Urine     Patient, w/ hx Dementia, presents with blood in urine for weeks.        Past " Medical History:   Diagnosis Date    Hx of bladder cancer     Hypercholesterolemia     Hypertension     Memory difficulties     Neuropathy     Seizures (HCC)     Stroke (HCC)       Past Surgical History:   Procedure Laterality Date    BACK SURGERY      DENTAL SURGERY        Family History   Problem Relation Age of Onset    No Known Problems Mother     Heart disease Father     No Known Problems Sister     No Known Problems Brother     Post-traumatic stress disorder Niece     Anxiety disorder Niece     PKU Niece       Social History     Tobacco Use    Smoking status: Former     Current packs/day: 0.00     Types: Cigarettes     Quit date:      Years since quittin.1     Passive exposure: Past    Smokeless tobacco: Never   Vaping Use    Vaping status: Never Used   Substance Use Topics    Alcohol use: Yes     Comment: Rare    Drug use: Never      E-Cigarette/Vaping    E-Cigarette Use Never User       E-Cigarette/Vaping Substances    Nicotine No     THC No     CBD No     Flavoring No     Other No     Unknown No       I have reviewed and agree with the history as documented.     74-year-old male with history of dementia, aortic stenosis, hypertension, hyperlipidemia, bladder cancer, seizures, neuropathy who presents for evaluation of visual hallucinations and hematuria.  Patient's niece is at bedside who is his caretaker.  She reports that he first told her about the hematuria 4 days ago.  This morning, he was noted to be experiencing visual hallucinations.  She states that he called her in a panic because he thought someone was breaking into his house and that someone had destroyed the house.  She went to check on him immediately and the house was intact.  He has never had these symptoms before.  No fevers.  He denies any pain.  No vomiting or diarrhea.  He is at his baseline mental status which is oriented to self, place, and month.        Review of Systems   Constitutional:  Negative for fever.   Respiratory:   Negative for shortness of breath.    Cardiovascular:  Negative for chest pain.   Gastrointestinal:  Negative for abdominal pain, diarrhea, nausea and vomiting.   Genitourinary:  Positive for hematuria. Negative for dysuria, flank pain and frequency.   Musculoskeletal:  Negative for gait problem.   Skin:  Negative for rash.   Neurological:  Negative for weakness and light-headedness.   Psychiatric/Behavioral:  Positive for hallucinations.    All other systems reviewed and are negative.          Objective       ED Triage Vitals [02/14/25 2114]   Temperature Pulse Blood Pressure Respirations SpO2 Patient Position - Orthostatic VS   98.3 °F (36.8 °C) 94 133/76 18 96 % Lying      Temp Source Heart Rate Source BP Location FiO2 (%) Pain Score    Oral Monitor Left arm -- No Pain      Vitals      Date and Time Temp Pulse SpO2 Resp BP Pain Score FACES Pain Rating User   02/14/25 2114 98.3 °F (36.8 °C) 94 96 % 18 133/76 No Pain -- DG            Physical Exam  Vitals and nursing note reviewed.   Constitutional:       General: He is not in acute distress.     Appearance: He is not ill-appearing.   HENT:      Head: Normocephalic and atraumatic.      Nose: Nose normal.      Mouth/Throat:      Mouth: Mucous membranes are moist.   Eyes:      Extraocular Movements: Extraocular movements intact.      Conjunctiva/sclera: Conjunctivae normal.      Pupils: Pupils are equal, round, and reactive to light.   Cardiovascular:      Rate and Rhythm: Normal rate and regular rhythm.      Heart sounds: Murmur heard.      No friction rub. No gallop.   Pulmonary:      Effort: Pulmonary effort is normal.      Breath sounds: Normal breath sounds. No wheezing, rhonchi or rales.   Abdominal:      General: There is no distension.      Palpations: Abdomen is soft.      Tenderness: There is no abdominal tenderness. There is no right CVA tenderness or left CVA tenderness.   Musculoskeletal:         General: No swelling or tenderness. Normal range of motion.       Cervical back: Normal range of motion and neck supple.   Skin:     General: Skin is warm and dry.      Coloration: Skin is not pale.      Findings: No rash.   Neurological:      General: No focal deficit present.      Mental Status: He is alert. Mental status is at baseline.      Comments: Cranial nerves II through XII intact.  5 out of 5 strength and sensation intact all 4 extremities.  Patient oriented to self, place, and month.  Disoriented to year which is baseline for him.   Psychiatric:         Behavior: Behavior normal.         Results Reviewed       Procedure Component Value Units Date/Time    Urine Microscopic [[id number]]  (Abnormal) Collected: 02/14/25 2312    Lab Status: Final result Specimen: Urine, Other Updated: 02/15/25 0000     RBC, UA 10-20 /hpf      WBC, UA 20-30 /hpf      Epithelial Cells Occasional /hpf      Bacteria, UA Moderate /hpf      Hyaline Casts, UA 1-2 /lpf      AMORPH URATES Occasional /hpf     Urine culture [[id number]] Collected: 02/14/25 2312    Lab Status: In process Specimen: Urine, Other Updated: 02/14/25 2359    UA w Reflex to Microscopic w Reflex to Culture [[id number]]  (Abnormal) Collected: 02/14/25 2312    Lab Status: Final result Specimen: Urine, Other Updated: 02/14/25 2321     Color, UA Lauren     Clarity, UA Slightly Cloudy     Specific Gravity, UA 1.025     pH, UA 6.0     Leukocytes, UA Negative     Nitrite, UA Positive     Protein, UA 1+ mg/dl      Glucose, UA Negative mg/dl      Ketones, UA Negative mg/dl      Urobilinogen, UA 0.2 E.U./dl      Bilirubin, UA Negative     Occult Blood, UA 3+    TSH, 3rd generation with Free T4 reflex [[id number]]  (Abnormal) Collected: 02/14/25 2148    Lab Status: Final result Specimen: Blood from Arm, Left Updated: 02/14/25 2225     TSH 3RD GENERATON 6.979 uIU/mL     T4, free [[id number]] Collected: 02/14/25 2148    Lab Status: In process Specimen: Blood from Arm, Left Updated: 02/14/25 2225    Comprehensive metabolic panel [[id number]]   (Abnormal) Collected: 02/14/25 2148    Lab Status: Final result Specimen: Blood from Arm, Left Updated: 02/14/25 2212     Sodium 143 mmol/L      Potassium 3.6 mmol/L      Chloride 107 mmol/L      CO2 25 mmol/L      ANION GAP 11 mmol/L      BUN 35 mg/dL      Creatinine 1.40 mg/dL      Glucose 157 mg/dL      Calcium 9.6 mg/dL      AST 36 U/L      ALT 23 U/L      Alkaline Phosphatase 88 U/L      Total Protein 7.1 g/dL      Albumin 4.1 g/dL      Total Bilirubin 0.68 mg/dL      eGFR 48 ml/min/1.73sq m     Narrative:      National Kid[employer] Disease Foundation guidelines for Chronic Kidney Disease (CKD):     Stage 1 with normal or high GFR (GFR > 90 mL/min/1.73 square meters)    Stage 2 Mild CKD (GFR = 60-89 mL/min/1.73 square meters)    Stage 3A Moderate CKD (GFR = 45-59 mL/min/1.73 square meters)    Stage 3B Moderate CKD (GFR = 30-44 mL/min/1.73 square meters)    Stage 4 Severe CKD (GFR = 15-29 mL/min/1.73 square meters)    Stage 5 End Stage CKD (GFR <15 mL/min/1.73 square meters)  Note: GFR calculation is accurate only with a steady state creatinine    CBC and differential [[id number]]  (Abnormal) Collected: 02/14/25 2148    Lab Status: Final result Specimen: Blood from Arm, Left Updated: 02/14/25 2154     WBC 8.25 Thousand/uL      RBC 4.30 Million/uL      Hemoglobin 13.3 g/dL      Hematocrit 40.7 %      MCV 95 fL      MCH 30.9 pg      MCHC 32.7 g/dL      RDW 13.3 %      MPV 9.4 fL      Platelets 183 Thousands/uL      nRBC 0 /100 WBCs      Segmented % 86 %      Immature Grans % 1 %      Lymphocytes % 7 %      Monocytes % 5 %      Eosinophils Relative 0 %      Basophils Relative 1 %      Absolute Neutrophils 7.13 Thousands/µL      Absolute Immature Grans 0.04 Thousand/uL      Absolute Lymphocytes 0.58 Thousands/µL      Absolute Monocytes 0.44 Thousand/µL      Eosinophils Absolute 0.02 Thousand/µL      Basophils Absolute 0.04 Thousands/µL             CT head without contrast   Final Interpretation by Nba Hendrickson MD  (02/14 2341)      No acute intracranial abnormality.  Chronic microangiopathic changes.                  Workstation performed: [id number]         CT abdomen pelvis with contrast   Final Interpretation by Gisella Cruz MD (02/14 2329)      1.4 cm partially calcified mucosal soft tissue lesion along the right bladder wall concerning for neoplasm. Urology consult recommended.         Workstation performed: [id number]             Procedures    ED Medication and Procedure Management   Prior to Admission Medications   Prescriptions Last Dose Informant Patient Reported? Taking?   aspirin 81 mg chewable tablet  Self, Care Giver No No   Sig: Chew 1 tablet (81 mg total) daily   atorvastatin (LIPITOR) 40 mg tablet  Self, Care Giver No No   Sig: TAKE 1 TABLET (40 MG TOTAL) BY MOUTH EVERY EVENING   carBAMazepine (TEGretol XR) 400 mg 12 hr tablet   No No   Sig: Take 1 tablet (400 mg total) by mouth 2 (two) times a day   donepezil (ARICEPT) 10 mg tablet  Self, Care Giver Yes No   Sig: Take 10 mg by mouth daily at bedtime 1 tab Daily    gabapentin (NEURONTIN) 300 mg capsule  Self, Care Giver Yes No   Sig: Take 300 mg by mouth daily at bedtime 1 cap nightly   memantine (NAMENDA) 10 mg tablet  Self, Care Giver Yes No   Sig: Take 1 tablet by mouth 2 (two) times a day   metoprolol succinate (TOPROL-XL) 50 mg 24 hr tablet  Self, Care Giver No No   Sig: TAKE 1 TABLET (50 MG TOTAL) BY MOUTH 2 (TWO) TIMES A DAY      Facility-Administered Medications: None     Patient's Medications   Discharge Prescriptions    No medications on file     No discharge procedures on file.  ED SEPSIS DOCUMENTATION   Time reflects when diagnosis was documented in both MDM as applicable and the Disposition within this note       Time User Action Codes Description Comment    2/15/2025 12:54 AM María Elena Salvador [N39.0] UTI (urinary tract infection)     2/15/2025 12:54 AM María Elena Salvador [N32.89] Bladder mass     2/15/2025 12:54 AM María Elena Salvador [R44.1]  Visual hallucinations                  María Elena Salvador MD  02/15/25 0051

## 2025-02-15 NOTE — ASSESSMENT & PLAN NOTE
Patient with past medical history of bladder cancer  CT abdomen pelvis showed 1.4 cm partially calcified mucosal soft tissue lesion along the right bladder wall concerning for neoplasm.  Urology consult appreciate recommendations

## 2025-02-15 NOTE — UTILIZATION REVIEW
OBSERVATION 2/15/25 @ 0055 CONVERTED TO INPATIENT 2/15/25@1531 DUE TO HEMATURIA  Initial Clinical Review    Admission: Date/Time/Statement:   Admission Orders (From admission, onward)       Ordered        02/15/25 1531  INPATIENT ADMISSION  Once            02/15/25 0055  Place in Observation  Once                          Orders Placed This Encounter   Procedures    Place in Observation     Standing Status:   Standing     Number of Occurrences:   1     Level of Care:   Med Surg [16]    INPATIENT ADMISSION     Standing Status:   Standing     Number of Occurrences:   1     Level of Care:   Med Surg [16]     Estimated length of stay:   More than 2 Midnights     Certification:   I certify that inpatient services are medically necessary for this patient for a duration of greater than two midnights. See H&P and MD Progress Notes for additional information about the patient's course of treatment.     ED Arrival Information       Expected   -    Arrival   2/14/2025 21:08    Acuity   Urgent              Means of arrival   Walk-In    Escorted by   Family Member    Service   Hospitalist    Admission type   Emergency              Arrival complaint   DEMENTIA  HALLUCINATIONS  BLOOD IN URINE             Chief Complaint   Patient presents with    Blood in Urine     Patient, w/ hx Dementia, presents with blood in urine for weeks.        Initial Presentation: 74 y.o. male to the ED from home with complaints of hallucinations, blood in urine, increased forgetfulness.  Admitted under observation then converted to inpatient for hematuria, uti. H/O bladder cancer, hypertension, hyperlipidemia, dementia, CVA, seizures.  CT head shows no acute findings, CTA/p shows: 1.4 cm partially calcified mucosal soft tissue lesion along the right bladder wall concerning for neoplasm.  IN the ED, given 1 liter iv fluid bolus, started on IV abx.       Date: 2/15   Day 2:   Check PVR, continue if abx.  URology consult.  Continue iv fluids. URine culture  pending. Creat stable.   2/15 QUICK note:  new onset numbness and tingling in fingers and toes. GCs 15, NIHSS 0.  After further discussion, noted that the numbness and tingling is chronic.      Date: 2/16  Day 3: Has surpassed a 2nd midnight with active treatments and services.INitially admitted for hematuria. Being treated with iv abx, iv fluids.  Calm, cooperative, having hallucinations. GCS 15. Urine pool.         ED Treatment-Medication Administration from 02/14/2025 2108 to 02/15/2025 0832         Date/Time Order Dose Route Action     02/14/2025 2220 sodium chloride 0.9 % bolus 1,000 mL 1,000 mL Intravenous New Bag     02/14/2025 2246 iohexol (OMNIPAQUE) 350 MG/ML injection (SINGLE-DOSE) 100 mL 100 mL Intravenous Given     02/15/2025 0110 cefTRIAXone (ROCEPHIN) IVPB (premix in dextrose) 1,000 mg 50 mL 1,000 mg Intravenous New Bag     02/15/2025 0832 aspirin chewable tablet 81 mg 81 mg Oral Given     02/15/2025 0830 carBAMazepine (TEGretol XR) 12 hr tablet 400 mg 400 mg Oral Given     02/15/2025 0536 donepezil (ARICEPT) tablet 10 mg 10 mg Oral Given     02/15/2025 0145 gabapentin (NEURONTIN) capsule 300 mg -- Oral Held Dose     02/15/2025 2200 gabapentin (NEURONTIN) capsule 300 mg -- Oral Held Dose     02/16/2025 2200 gabapentin (NEURONTIN) capsule 300 mg -- Oral Held Dose     02/17/2025 2200 gabapentin (NEURONTIN) capsule 300 mg -- Oral Held Dose     02/18/2025 2200 gabapentin (NEURONTIN) capsule 300 mg -- Oral Held Dose     02/15/2025 0536 memantine (NAMENDA) tablet 10 mg 10 mg Oral Given     02/15/2025 0831 metoprolol succinate (TOPROL-XL) 24 hr tablet 50 mg 50 mg Oral Given     02/15/2025 0347 multi-electrolyte (PLASMALYTE-A/ISOLYTE-S PH 7.4) IV solution 100 mL/hr Intravenous New Bag            Scheduled Medications:  aspirin, 81 mg, Oral, Daily  atorvastatin, 40 mg, Oral, QPM  carBAMazepine, 400 mg, Oral, BID  [START ON 2/16/2025] cefTRIAXone, 1,000 mg, Intravenous, Q24H  donepezil, 10 mg, Oral, HS  [Held  by provider] gabapentin, 300 mg, Oral, HS  memantine, 10 mg, Oral, BID  metoprolol succinate, 50 mg, Oral, BID      Continuous IV Infusions:       PRN Meds:  Diclofenac Sodium, 2 g, Topical, 4x Daily PRN      ED Triage Vitals [02/14/25 2114]   Temperature Pulse Respirations Blood Pressure SpO2 Pain Score   98.3 °F (36.8 °C) 94 18 133/76 96 % No Pain     Weight (last 2 days)       None            Vital Signs (last 3 days)       Date/Time Temp Pulse Resp BP MAP (mmHg) SpO2 O2 Device Patient Position - Orthostatic VS Jeaneth Coma Scale Score Pain    02/16/25 0700 97.9 °F (36.6 °C) 73 18 158/94 120 98 % None (Room air) Lying -- No Pain    02/15/25 2052 98.4 °F (36.9 °C) 72 18 126/84 -- 98 % None (Room air) Lying -- --    02/15/25 2000 -- -- -- -- -- -- None (Room air) -- 15 No Pain    02/15/25 1700 -- 74 -- 163/86 118 98 % -- -- -- --    02/15/25 1500 -- 69 -- 135/72 98 96 % -- -- -- --    02/15/25 1400 -- 72 -- 126/67 91 97 % -- -- -- --    02/15/25 1100 -- 70 -- 138/79 102 -- -- -- -- --    02/15/25 0800 -- 70 -- 176/90 127 99 % -- -- -- --    02/15/25 0600 -- 66 16 153/85 114 97 % None (Room air) Lying -- --    02/15/25 0500 -- 71 -- 172/98 129 97 % -- -- -- --    02/15/25 0415 -- 67 18 144/86 109 97 % None (Room air) Lying -- --    02/15/25 0127 -- -- -- -- -- -- -- -- 15 --    02/14/25 2114 98.3 °F (36.8 °C) 94 18 133/76 -- 96 % None (Room air) Lying -- No Pain              Pertinent Labs/Diagnostic Test Results:   Radiology:  CT head without contrast   Final Interpretation by Nba Hendrickson MD (02/14 2761)      No acute intracranial abnormality.  Chronic microangiopathic changes.                  Workstation performed: [id number]         CT abdomen pelvis with contrast   Final Interpretation by Gisella Cruz MD (02/14 2329)      1.4 cm partially calcified mucosal soft tissue lesion along the right bladder wall concerning for neoplasm. Urology consult recommended.         Workstation performed: [id number]            Cardiology:        Results from last 7 days   Lab Units 02/16/25  0439 02/15/25  0535 02/14/25  2148   WBC Thousand/uL 6.35 5.62 8.25   HEMOGLOBIN g/dL 13.0 12.5 13.3   HEMATOCRIT % 40.3 39.3 40.7   PLATELETS Thousands/uL 158 151 183   TOTAL NEUT ABS Thousands/µL 5.08 4.28 7.13         Results from last 7 days   Lab Units 02/16/25  0439 02/15/25  0535 02/14/25 2148   SODIUM mmol/L 144 144 143   POTASSIUM mmol/L 3.4* 4.4 3.6   CHLORIDE mmol/L 110* 111* 107   CO2 mmol/L 26 26 25   ANION GAP mmol/L 8 7 11   BUN mg/dL 28* 30* 35*   CREATININE mg/dL 0.93 1.11 1.40*   EGFR ml/min/1.73sq m 80 64 48   CALCIUM mg/dL 8.6 9.1 9.6   MAGNESIUM mg/dL  --  2.1  --    PHOSPHORUS mg/dL  --  3.7  --      Results from last 7 days   Lab Units 02/16/25  0439 02/15/25  0535 02/14/25 2148   AST U/L 30 32 36   ALT U/L 25 21 23   ALK PHOS U/L 83 88 88   TOTAL PROTEIN g/dL 6.0* 6.4 7.1   ALBUMIN g/dL 3.4* 3.7 4.1   TOTAL BILIRUBIN mg/dL 0.46 0.53 0.68         Results from last 7 days   Lab Units 02/16/25  0439 02/15/25  0535 02/14/25 2148   GLUCOSE RANDOM mg/dL 87 95 157*             Results from last 7 days   Lab Units 02/14/25  2148   TSH 3RD GENERATON uIU/mL 6.979*             Results from last 7 days   Lab Units 02/14/25  2312   CLARITY UA  Slightly Cloudy*   COLOR UA  Lauren*   SPEC GRAV UA  1.025   PH UA  6.0   GLUCOSE UA mg/dl Negative   KETONES UA mg/dl Negative   BLOOD UA  3+*   PROTEIN UA mg/dl 1+*   NITRITE UA  Positive*   BILIRUBIN UA  Negative   UROBILINOGEN UA E.U./dl 0.2   LEUKOCYTES UA  Negative   WBC UA /hpf 20-30*   RBC UA /hpf 10-20*   BACTERIA UA /hpf Moderate*   EPITHELIAL CELLS WET PREP /hpf Occasional         Past Medical History:   Diagnosis Date    Hx of bladder cancer     Hypercholesterolemia     Hypertension     Memory difficulties     Neuropathy     Seizures (HCC)     Stroke (HCC)      Present on Admission:   Dementia (HCC)   HTN (hypertension)   Seizure (HCC)   Stroke (HCC)      Admitting Diagnosis:  Blood in urine [R31.9]  Age/Sex: 74 y.o. male    Network Utilization Review Department  ATTENTION: Please call with any questions or concerns to [phone] and carefully listen to the prompts so that you are directed to the right person. All voicemails are confidential.   For Discharge needs, contact Care Management DC Support Team at [phone] opt. 2  Send all requests for admission clinical reviews, approved or denied determinations and any other requests to dedicated fax number below belonging to the campus where the patient is receiving treatment. List of dedicated fax numbers for the Facilities:  FACILITY NAME UR FAX NUMBER   ADMISSION DENIALS (Administrative/Medical Necessity) [phone]   DISCHARGE SUPPORT TEAM (NETWORK) [phone]   PARENT CHILD HEALTH (Maternity/NICU/Pediatrics) [phone]   [facility] [phone]   [facility] [phone]   [facility] [phone]   [facility] [phone]   [facility] [phone]   [facility] [phone]   [facility] [phone]   [facility] [phone]   [facility] [phone]   [facility] [phone]   [facility] [phone]   [facility] [phone]

## 2025-02-16 VITALS
SYSTOLIC BLOOD PRESSURE: 158 MMHG | DIASTOLIC BLOOD PRESSURE: 94 MMHG | HEART RATE: 73 BPM | OXYGEN SATURATION: 98 % | TEMPERATURE: 97.9 F | RESPIRATION RATE: 18 BRPM

## 2025-02-16 PROBLEM — G93.41 ACUTE METABOLIC ENCEPHALOPATHY: Status: ACTIVE | Noted: 2025-02-15

## 2025-02-16 LAB
ALBUMIN SERPL BCG-MCNC: 3.4 G/DL (ref 3.5–5)
ALP SERPL-CCNC: 83 U/L (ref 34–104)
ALT SERPL W P-5'-P-CCNC: 25 U/L (ref 7–52)
ANION GAP SERPL CALCULATED.3IONS-SCNC: 8 MMOL/L (ref 4–13)
AST SERPL W P-5'-P-CCNC: 30 U/L (ref 13–39)
BACTERIA UR CULT: NORMAL
BASOPHILS # BLD AUTO: 0.06 THOUSANDS/ΜL (ref 0–0.1)
BASOPHILS NFR BLD AUTO: 1 % (ref 0–1)
BILIRUB SERPL-MCNC: 0.46 MG/DL (ref 0.2–1)
BUN SERPL-MCNC: 28 MG/DL (ref 5–25)
CALCIUM ALBUM COR SERPL-MCNC: 9.1 MG/DL (ref 8.3–10.1)
CALCIUM SERPL-MCNC: 8.6 MG/DL (ref 8.4–10.2)
CHLORIDE SERPL-SCNC: 110 MMOL/L (ref 96–108)
CO2 SERPL-SCNC: 26 MMOL/L (ref 21–32)
CREAT SERPL-MCNC: 0.93 MG/DL (ref 0.6–1.3)
EOSINOPHIL # BLD AUTO: 0.08 THOUSAND/ΜL (ref 0–0.61)
EOSINOPHIL NFR BLD AUTO: 1 % (ref 0–6)
ERYTHROCYTE [DISTWIDTH] IN BLOOD BY AUTOMATED COUNT: 13.3 % (ref 11.6–15.1)
GFR SERPL CREATININE-BSD FRML MDRD: 80 ML/MIN/1.73SQ M
GLUCOSE SERPL-MCNC: 87 MG/DL (ref 65–140)
HCT VFR BLD AUTO: 40.3 % (ref 36.5–49.3)
HGB BLD-MCNC: 13 G/DL (ref 12–17)
IMM GRANULOCYTES # BLD AUTO: 0.04 THOUSAND/UL (ref 0–0.2)
IMM GRANULOCYTES NFR BLD AUTO: 1 % (ref 0–2)
LYMPHOCYTES # BLD AUTO: 0.6 THOUSANDS/ΜL (ref 0.6–4.47)
LYMPHOCYTES NFR BLD AUTO: 9 % (ref 14–44)
MCH RBC QN AUTO: 31.3 PG (ref 26.8–34.3)
MCHC RBC AUTO-ENTMCNC: 32.3 G/DL (ref 31.4–37.4)
MCV RBC AUTO: 97 FL (ref 82–98)
MONOCYTES # BLD AUTO: 0.49 THOUSAND/ΜL (ref 0.17–1.22)
MONOCYTES NFR BLD AUTO: 8 % (ref 4–12)
NEUTROPHILS # BLD AUTO: 5.08 THOUSANDS/ΜL (ref 1.85–7.62)
NEUTS SEG NFR BLD AUTO: 80 % (ref 43–75)
NRBC BLD AUTO-RTO: 0 /100 WBCS
PLATELET # BLD AUTO: 158 THOUSANDS/UL (ref 149–390)
PMV BLD AUTO: 9.8 FL (ref 8.9–12.7)
POTASSIUM SERPL-SCNC: 3.4 MMOL/L (ref 3.5–5.3)
PROT SERPL-MCNC: 6 G/DL (ref 6.4–8.4)
RBC # BLD AUTO: 4.16 MILLION/UL (ref 3.88–5.62)
SODIUM SERPL-SCNC: 144 MMOL/L (ref 135–147)
WBC # BLD AUTO: 6.35 THOUSAND/UL (ref 4.31–10.16)

## 2025-02-16 PROCEDURE — 36415 COLL VENOUS BLD VENIPUNCTURE: CPT | Performed by: INTERNAL MEDICINE

## 2025-02-16 PROCEDURE — 99239 HOSP IP/OBS DSCHRG MGMT >30: CPT | Performed by: INTERNAL MEDICINE

## 2025-02-16 PROCEDURE — 80053 COMPREHEN METABOLIC PANEL: CPT | Performed by: INTERNAL MEDICINE

## 2025-02-16 PROCEDURE — 85025 COMPLETE CBC W/AUTO DIFF WBC: CPT | Performed by: INTERNAL MEDICINE

## 2025-02-16 RX ORDER — CEFPODOXIME PROXETIL 200 MG/1
200 TABLET, FILM COATED ORAL 2 TIMES DAILY
Qty: 6 TABLET | Refills: 0 | Status: SHIPPED | OUTPATIENT
Start: 2025-02-17 | End: 2025-02-20

## 2025-02-16 RX ORDER — POTASSIUM CHLORIDE 1500 MG/1
40 TABLET, EXTENDED RELEASE ORAL ONCE
Status: COMPLETED | OUTPATIENT
Start: 2025-02-16 | End: 2025-02-16

## 2025-02-16 RX ADMIN — CYANOCOBALAMIN TAB 500 MCG 1000 MCG: 500 TAB at 08:52

## 2025-02-16 RX ADMIN — POTASSIUM CHLORIDE 40 MEQ: 1500 TABLET, EXTENDED RELEASE ORAL at 09:08

## 2025-02-16 RX ADMIN — CARBAMAZEPINE 400 MG: 100 TABLET, EXTENDED RELEASE ORAL at 10:11

## 2025-02-16 RX ADMIN — MEMANTINE 10 MG: 5 TABLET ORAL at 06:58

## 2025-02-16 RX ADMIN — METOPROLOL SUCCINATE 50 MG: 50 TABLET, EXTENDED RELEASE ORAL at 08:52

## 2025-02-16 RX ADMIN — ASPIRIN 81 MG 81 MG: 81 TABLET ORAL at 08:52

## 2025-02-16 RX ADMIN — CEFTRIAXONE 1000 MG: 1 INJECTION, SOLUTION INTRAVENOUS at 00:42

## 2025-02-16 NOTE — DISCHARGE SUMMARY
Discharge Summary - Hospitalist   Name: Jon Morton 74 y.o. male I MRN: [id number]  Unit/Bed#: ED OVR 17 I Date of Admission: 2/14/2025   Date of Service: 2/16/2025 I Hospital Day: 1     Assessment & Plan  Hematuria  Patient with past medical history of bladder cancer treated with mitramycin and resection according to the notes  Patient presents hematuria for the last few days  CT abdomen pelvis showed 1.4 cm partially calcified mucosal soft tissue lesion along the right bladder wall concerning for neoplasm.  Urology recommended admission for treatment of UTI and probably outpatient follow-up with urology  Plan  Urology consulted appreciate recommendations  Bladder scan  Retention protocol  Continue to monitor hemoglobin  Hematuria has resolved with treatment of UTI, will place referral to urology for management of bladder mass  UTI (urinary tract infection)  Patient presented with UA positive for leukocytes  However that can be in the setting of hematuria  Will treat as UTI due to altered mental status  Continue ceftriaxone  Continue IV fluid  Follow-up on urine culture  Stroke (HCC)  History of stroke with no residual deficits  Continue aspirin and atorvastatin  Seizure (HCC)  History of seizures  Continue carbamazepine  HTN (hypertension)  Past medical history of hypertension  Continue metoprolol  Dementia (HCC)  History of dementia  Patient lives independently however the niece is taking care of his meds  Continue donepezil and memantine  Bladder mass  Patient with past medical history of bladder cancer  CT abdomen pelvis showed 1.4 cm partially calcified mucosal soft tissue lesion along the right bladder wall concerning for neoplasm.  Urology consult appreciate recommendations  Acute metabolic encephalopathy  Patient present with visual hallucination, since admission this is now resolved no further hallucinations during his inpatient stay  Might be in the setting of infection versus advanced  dementia  Patient is stable on exam without hallucination  Continue to monitor  Gabapentin resumed     Medical Problems       Resolved Problems  Date Reviewed: 2/16/2025   None       Discharging Physician / Practitioner: Harpal Cheung DO  PCP: Sb Crawford MD  Admission Date:   Admission Orders (From admission, onward)       Ordered        02/15/25 1531  INPATIENT ADMISSION  Once            02/15/25 0055  Place in Observation  Once                          Discharge Date: 02/16/25    Incidental finding of bladder mass discussed with his niece, this will be followed up by outpatient urology    Reason for Admission:     Hospital Course:   Jon Morton is a 74 y.o. male patient who originally presented to the hospital on 2/14/2025 due to mental status with hallucinations and hematuria, in the setting of urinary tract infection, the hematuria and hallucinations resolved with treatment for his UTI.  Patient was also found to have a bladder mass which will be evaluated by urology as an outpatient this was discussed with his niece.          Please see above list of diagnoses and related plan for additional information.     Condition at Discharge: stable    Discharge Day Visit / Exam:   Subjective: he denies any acute complaints on the day of discharge  Vitals: Blood Pressure: 158/94 (02/16/25 0700)  Pulse: 73 (02/16/25 0700)  Temperature: 97.9 °F (36.6 °C) (02/16/25 0700)  Temp Source: Oral (02/16/25 0700)  Respirations: 18 (02/16/25 0700)  SpO2: 98 % (02/16/25 0700)  Physical Exam  Vitals and nursing note reviewed.   Constitutional:       General: He is not in acute distress.     Appearance: He is well-developed. He is not toxic-appearing or diaphoretic.   HENT:      Head: Normocephalic and atraumatic.   Eyes:      General: No scleral icterus.     Conjunctiva/sclera: Conjunctivae normal.   Cardiovascular:      Rate and Rhythm: Normal rate and regular rhythm.      Heart sounds: No murmur heard.     No friction rub. No  gallop.   Pulmonary:      Effort: Pulmonary effort is normal. No respiratory distress.      Breath sounds: Normal breath sounds. No stridor. No wheezing, rhonchi or rales.   Chest:      Chest wall: No tenderness.   Abdominal:      General: There is no distension.      Palpations: Abdomen is soft. There is no mass.      Tenderness: There is no abdominal tenderness. There is no guarding or rebound.      Hernia: No hernia is present.   Musculoskeletal:         General: No swelling or tenderness.      Cervical back: Neck supple.   Skin:     General: Skin is warm and dry.      Capillary Refill: Capillary refill takes less than 2 seconds.      Coloration: Skin is not jaundiced or pale.   Neurological:      Mental Status: He is alert and oriented to person, place, and time.   Psychiatric:         Mood and Affect: Mood normal.          Discussion with Family: Updated  (niece) via phone.    Discharge instructions/Information to patient and family:   See after visit summary for information provided to patient and family.      Provisions for Follow-Up Care:  See after visit summary for information related to follow-up care and any pertinent home health orders.      Mobility at time of Discharge:   Basic Mobility Inpatient Raw Score: 24  JH-HLM Goal: 8: Walk 250 feet or more  JH-HLM Achieved: 1: Laying in bed  HLM Goal achieved. Continue to encourage appropriate mobility.     Disposition:   Home    Planned Readmission: no    Discharge Medications:  See after visit summary for reconciled discharge medications provided to patient and/or family.      Administrative Statements   Discharge Statement:  I have spent a total time of  minutes in caring for this patient on the day of the visit/encounter. .    **Please Note: This note may have been constructed using a voice recognition system**

## 2025-02-16 NOTE — PLAN OF CARE
Problem: INFECTION - ADULT  Goal: Absence or prevention of progression during hospitalization  Description: INTERVENTIONS:  - Assess and monitor for signs and symptoms of infection  - Monitor lab/diagnostic results  - Monitor all insertion sites, i.e. indwelling lines, tubes, and drains  - Monitor endotracheal if appropriate and nasal secretions for changes in amount and color  - [place] appropriate cooling/warming therapies per order  - Administer medications as ordered  - Instruct and encourage patient and family to use good hand hygiene technique  - Identify and instruct in appropriate isolation precautions for identified infection/condition  Outcome: Progressing     Problem: PAIN - ADULT  Goal: Verbalizes/displays adequate comfort level or baseline comfort level  Description: Interventions:  - Encourage patient to monitor pain and request assistance  - Assess pain using appropriate pain scale  - Administer analgesics based on type and severity of pain and evaluate response  - Implement non-pharmacological measures as appropriate and evaluate response  - Consider cultural and social influences on pain and pain management  - Notify physician/advanced practitioner if interventions unsuccessful or patient reports new pain  Outcome: Progressing

## 2025-02-16 NOTE — ASSESSMENT & PLAN NOTE
Patient with past medical history of bladder cancer treated with mitramycin and resection according to the notes  Patient presents hematuria for the last few days  CT abdomen pelvis showed 1.4 cm partially calcified mucosal soft tissue lesion along the right bladder wall concerning for neoplasm.  Urology recommended admission for treatment of UTI and probably outpatient follow-up with urology  Plan  Urology consulted appreciate recommendations  Bladder scan  Retention protocol  Continue to monitor hemoglobin  Hematuria has resolved with treatment of UTI, will place referral to urology for management of bladder mass

## 2025-02-16 NOTE — QUICK NOTE
Contacted by RN via secure chat at [phone] regarding a new onset of numbness and tingling in his fingers and toes.  Also RN report disorientation.  Per RN, neuroassessment is within normal limits but does not know the president and request to see a provider.  At bedside in ED 17 the patient is alert and oriented x 3 (person /place /time), pleasant, GCS 15, NIH SS 0, able to follow commands and verbalize needs the patient states that he would like to get some relief for his chronic numbness and tingling.  Patient is already on gabapentin 300 mg at bedtime and vitamin B12 daily.  Due to patient request I will add gabapentin 100 mg to bedtime dose.  I have also ordered nonpharmacological interventions for patient comfort.  During this visit the patient denies any other complaints.  All questions and needs were answered/attended accordingly.  RN was made aware of plan of care.

## 2025-02-16 NOTE — ED NOTES
"Trupti, pt's niece, notified of pt's discharge. Per Trupti, \"I will be on my way\".      Zachary Robertson RN  02/16/25 0941    "

## 2025-02-16 NOTE — ASSESSMENT & PLAN NOTE
Patient present with visual hallucination, since admission this is now resolved no further hallucinations during his inpatient stay  Might be in the setting of infection versus advanced dementia  Patient is stable on exam without hallucination  Continue to monitor  Gabapentin resumed

## 2025-02-16 NOTE — ASSESSMENT & PLAN NOTE
History of dementia  Patient lives independently however the niece is taking care of his meds  Continue donepezil and memantine   No

## 2025-02-17 ENCOUNTER — TELEPHONE (OUTPATIENT)
Age: 75
End: 2025-02-17

## 2025-02-17 NOTE — UTILIZATION REVIEW
NOTIFICATION OF ADMISSION DISCHARGE   This is a Notification of Discharge from [facility]. Please be advised that this patient has been discharge from our facility. Below you will find the admission and discharge date and time including the patient’s disposition.   UTILIZATION REVIEW CONTACT:  Margaret Parra  Utilization   Network Utilization Review Department  Phone: [phone] x carefully listen to the prompts. All voicemails are confidential.  Email: [email or web address]     ADMISSION INFORMATION  PRESENTATION DATE: 2/14/2025  9:09 PM  OBERVATION ADMISSION DATE: 02/15/2025 0055  INPATIENT ADMISSION DATE: 2/15/25  3:31 PM   DISCHARGE DATE: 2/16/2025 11:28 AM   DISPOSITION:Home/Self Care    Network Utilization Review Department  ATTENTION: Please call with any questions or concerns to [phone] and carefully listen to the prompts so that you are directed to the right person. All voicemails are confidential.   For Discharge needs, contact Care Management DC Support Team at [phone] opt. 2  Send all requests for admission clinical reviews, approved or denied determinations and any other requests to dedicated fax number below belonging to the campus where the patient is receiving treatment. List of dedicated fax numbers for the Facilities:  FACILITY NAME UR FAX NUMBER   ADMISSION DENIALS (Administrative/Medical Necessity) [phone]   DISCHARGE SUPPORT TEAM ([facility]) [phone]   PARENT CHILD HEALTH (Maternity/NICU/Pediatrics) [phone]   [facility] [phone]   [facility] [phone]   [facility] [phone]   [facility] [phone]   Psychiatric hospital [phone]   [facility] [phone]   [facility] [phone]   [facility] [phone]   [facility] [phone]   [facility] [phone]   [facility] [phone]   [facility] [phone]

## 2025-02-17 NOTE — TELEPHONE ENCOUNTER
New Patient    Appointment Scheduling  What office location does the patient prefer?: [place]  What is the reason for the patient's appointment?: Bladder Mass  Have patient records been requested?:  If No, are the records showing in Epic: Yes    Appointment Details  Date: 3/3/25  Time: 11:15am     Location: [place]     Provider:   Does the appointment need further review? Please confirm appointment is appropriate. Declined [place] and [place] office. Requested Monday or Thursday appointment.     HISTORY  Is the patient having active symptoms? If so, describe symptoms: UTI symptoms  Has the patient had any previous Urologist(s)?: 10+ years ago   Was the patient seen in the ED?: 2/14/25   Has the patient had any outside testing done?: Labs & CT Scans 2/14/25  Does patient have Imaging/Lab Results: Labs & CT Scans 2/14/25  Does the patient have a personal history of any cancer?: Bladder Cancer    INSURANCE   Have you confirmed Patient's insurance? Yes  Is the insurance accepted? Yes  Is the insurance active? Yes

## 2025-02-18 DIAGNOSIS — N39.0 UTI (URINARY TRACT INFECTION): ICD-10-CM

## 2025-02-19 RX ORDER — CEFPODOXIME PROXETIL 200 MG/1
200 TABLET, FILM COATED ORAL 2 TIMES DAILY
Qty: 6 TABLET | Refills: 0 | OUTPATIENT
Start: 2025-02-19 | End: 2025-02-22

## 2025-02-28 RX ORDER — ASPIRIN 81 MG/1
81 TABLET, CHEWABLE ORAL DAILY
Qty: 30 TABLET | Refills: 0 | OUTPATIENT
Start: 2025-02-28

## 2025-03-03 ENCOUNTER — OFFICE VISIT (OUTPATIENT)
Age: 75
End: 2025-03-03
Payer: COMMERCIAL

## 2025-03-03 VITALS
DIASTOLIC BLOOD PRESSURE: 108 MMHG | BODY MASS INDEX: 23.02 KG/M2 | OXYGEN SATURATION: 98 % | SYSTOLIC BLOOD PRESSURE: 164 MMHG | HEART RATE: 78 BPM | HEIGHT: 77 IN | WEIGHT: 195 LBS

## 2025-03-03 DIAGNOSIS — C67.2 MALIGNANT NEOPLASM OF LATERAL WALL OF URINARY BLADDER (HCC): Primary | ICD-10-CM

## 2025-03-03 DIAGNOSIS — N32.89 BLADDER MASS: ICD-10-CM

## 2025-03-03 PROCEDURE — 99204 OFFICE O/P NEW MOD 45 MIN: CPT | Performed by: UROLOGY

## 2025-03-03 RX ORDER — LOSARTAN POTASSIUM 100 MG/1
100 TABLET ORAL DAILY
COMMUNITY

## 2025-03-03 NOTE — ASSESSMENT & PLAN NOTE
I highly suspect the patient has recurrence of bladder cancer based on CT scan and history of bladder cancer in the past.  We discussed options moving cystoscopy versus going straight to the operating room for TURBT.  His daughter who is power of  prefers going straight to the operating room.  They understand the possibility that he does not have cancer found and that we may have to do more and surgery then we may otherwise expect such as placement of a stent.    We discussed the nature of TURBT surgery including how surgery is performed (usually in outpatient setting and possible catheter on discharge).  Risks of surgery were discussed including significant bleeding, perforation (which could require prolonged catheter or emergency surgery), incomplete resection of tumor,  need for further surgery, infection, scar tissue formation along the urinary tract, injury to the ureteral orifices, possible need for stent and need for additional treatments based on pathology findings.  E consent was signed for TURBT with bilateral retrograde pyelograms and possible gemcitabine by his daughter.  We discussed that intraoperative findings may necessitate stent placement.    We will arrange for surgery to be done after he meets with cardiology which will be in April.

## 2025-03-03 NOTE — PROGRESS NOTES
Assessment/Plan:    Malignant neoplasm of lateral wall of urinary bladder (HCC)  I highly suspect the patient has recurrence of bladder cancer based on CT scan and history of bladder cancer in the past.  We discussed options moving cystoscopy versus going straight to the operating room for TURBT.  His daughter who is power of  prefers going straight to the operating room.  They understand the possibility that he does not have cancer found and that we may have to do more and surgery then we may otherwise expect such as placement of a stent.    We discussed the nature of TURBT surgery including how surgery is performed (usually in outpatient setting and possible catheter on discharge).  Risks of surgery were discussed including significant bleeding, perforation (which could require prolonged catheter or emergency surgery), incomplete resection of tumor,  need for further surgery, infection, scar tissue formation along the urinary tract, injury to the ureteral orifices, possible need for stent and need for additional treatments based on pathology findings.  E consent was signed for TURBT with bilateral retrograde pyelograms and possible gemcitabine by his daughter.  We discussed that intraoperative findings may necessitate stent placement.    We will arrange for surgery to be done after he meets with cardiology which will be in April.          Subjective:      Patient ID: Jon Morton is a 74 y.o. male.    HPI  74-year-old male with history of bladder cancer with concern for recurrence.    The patient is accompanied by his daughter as he has history of dementia and she is his medical power of .  He has a history of prior TURBT for bladder cancer in 2008.  Unknown what his pathology was at the time.    For recently the patient presented to hospital February 14th with hematuria and hallucinations in and hallucinations resolved with antibiotics although urine culture returned negative.  CT scan during  "admission was concerning for a 1.4 cm bladder mass along the right bladder wall        Denies any history of gross hematuria or voiding issues.  Is not on blood thinners.    He has not met with cardiology recently but is scheduled to meet with them in the near future along with cardiothoracic surgery.      Past Surgical History:   Procedure Laterality Date    BACK SURGERY      DENTAL SURGERY          Past Medical History:   Diagnosis Date    Hx of bladder cancer     Hypercholesterolemia     Hypertension     Memory difficulties     Neuropathy     Seizures (HCC)     Stroke (HCC)              Review of Systems   Constitutional:  Negative for chills and fever.   HENT:  Negative for ear pain and sore throat.    Eyes:  Negative for pain and visual disturbance.   Respiratory:  Negative for cough and shortness of breath.    Cardiovascular:  Negative for chest pain and palpitations.   Gastrointestinal:  Negative for abdominal pain and vomiting.   Genitourinary:  Negative for dysuria and hematuria.   Musculoskeletal:  Negative for arthralgias and back pain.   Skin:  Negative for color change and rash.   Neurological:  Negative for seizures and syncope.   All other systems reviewed and are negative.        Objective:      BP (!) 164/108 (BP Location: Left arm, Patient Position: Sitting, Cuff Size: Standard)   Pulse 78   Ht 6' 5\" (1.956 m)   Wt 88.5 kg (195 lb)   SpO2 98%   BMI 23.12 kg/m²     Lab Results   Component Value Date    PSA 1.4 04/24/2015          Physical Exam  Vitals reviewed.   Constitutional:       Appearance: Normal appearance. He is normal weight.   HENT:      Head: Normocephalic and atraumatic.   Eyes:      Pupils: Pupils are equal, round, and reactive to light.   Abdominal:      General: Abdomen is flat.   Neurological:      General: No focal deficit present.      Mental Status: He is alert and oriented to person, place, and time.   Psychiatric:         Mood and Affect: Mood normal.         Thought " Content: Thought content normal.         I personally viewed the patient CT scan showing a 1.5 cm right wall bladder lesion    CT ABDOMEN AND PELVIS WITH IV CONTRAST     INDICATION: New onset hematuria. .     COMPARISON: None.     TECHNIQUE: CT examination of the abdomen and pelvis was performed. Multiplanar 2D reformatted images were created from the source data.     This examination, like all CT scans performed in the [facility], was performed utilizing techniques to minimize radiation dose exposure, including the use of iterative reconstruction and automated exposure control. Radiation dose length   product (DLP) for this visit: 600.3 mGy-cm     IV Contrast: 100 mL of iohexol  Enteric Contrast: Not administered.     FINDINGS:     ABDOMEN     LOWER CHEST: No clinically significant abnormality in the visualized lower chest.     LIVER/BILIARY TREE: Simple hepatic cyst(s). No suspicious mass. Normal hepatic contours. No biliary dilation.     GALLBLADDER: No calcified gallstones. No pericholecystic inflammatory change.     SPLEEN: Unremarkable.     PANCREAS: Unremarkable.     ADRENAL GLANDS: Unremarkable.     KIDNEYS/URETERS: Simple renal cyst(s). Otherwise unremarkable kidneys. No hydronephrosis.     STOMACH AND BOWEL: Colonic diverticulosis without findings of acute diverticulitis.     APPENDIX: No findings to suggest appendicitis.     ABDOMINOPELVIC CAVITY: No ascites. No pneumoperitoneum. No lymphadenopathy.     VESSELS: Unremarkable for patient's age.     PELVIS     REPRODUCTIVE ORGANS: Unremarkable for patient's age.     URINARY BLADDER: 1.4 cm partially calcified mucosal soft tissue density lesion along the right bladder wall (401/127). Layering bladder calculi.     ABDOMINAL WALL/INGUINAL REGIONS: Unremarkable.     BONES: No acute fracture or suspicious osseous lesion.     IMPRESSION:     1.4 cm partially calcified mucosal soft tissue lesion along the right bladder wall concerning for  neoplasm. Urology consult recommended.       Orders  No orders of the defined types were placed in this encounter.

## 2025-03-08 NOTE — ASSESSMENT & PLAN NOTE
Patient has not seen a physician in greater than a year in a half. Patient has history of HTN.  Permissive hypertension while stroke workup was underway.  Home doses of medications as told by patient's legal guardian and niece, Trupti. Losartan 100mg qd, Metoprolol 100mg BID, Amlodipine 10mg BID    Plan   - Will start him on metoprolol 50mg BID here now, hold losartan and amlodipine for now  - His pressures have been well controlled thus far without his extensive home regimen.  - Questionable medication compliance as patient has baseline dementia and lives alone and manages his own medications.   Private Auto Walk in

## 2025-03-14 ENCOUNTER — TELEPHONE (OUTPATIENT)
Dept: UROLOGY | Facility: AMBULATORY SURGERY CENTER | Age: 75
End: 2025-03-14

## 2025-03-14 NOTE — TELEPHONE ENCOUNTER
Called Trupti to schedule patient for surgery. Trupti stated she was currently admitted into the hospital and would like me to call her back Monday to hopefully schedule. I did let Trupti know I will give a call Monday to schedule patient. Understanding was verbalized.

## 2025-03-17 DIAGNOSIS — I10 PRIMARY HYPERTENSION: ICD-10-CM

## 2025-03-17 DIAGNOSIS — Z86.73 CHRONIC CEREBROVASCULAR ACCIDENT (CVA): ICD-10-CM

## 2025-03-17 PROBLEM — N39.0 UTI (URINARY TRACT INFECTION): Status: RESOLVED | Noted: 2025-02-15 | Resolved: 2025-03-17

## 2025-03-18 ENCOUNTER — TELEPHONE (OUTPATIENT)
Dept: UROLOGY | Facility: AMBULATORY SURGERY CENTER | Age: 75
End: 2025-03-18

## 2025-03-18 RX ORDER — METOPROLOL SUCCINATE 50 MG/1
50 TABLET, EXTENDED RELEASE ORAL 2 TIMES DAILY
Qty: 60 TABLET | Refills: 5 | OUTPATIENT
Start: 2025-03-18

## 2025-03-18 RX ORDER — ATORVASTATIN CALCIUM 40 MG/1
40 TABLET, FILM COATED ORAL EVERY EVENING
Qty: 30 TABLET | Refills: 5 | OUTPATIENT
Start: 2025-03-18

## 2025-03-18 NOTE — TELEPHONE ENCOUNTER
Called patient to schedule surgery with Dr. Cardenas. Patient was unable to answer but I was able to leave a voicemail asking patient to please give the office a call back to schedule. Office number was provided.

## 2025-03-31 ENCOUNTER — PREP FOR PROCEDURE (OUTPATIENT)
Dept: CARDIOLOGY CLINIC | Facility: CLINIC | Age: 75
End: 2025-03-31

## 2025-03-31 ENCOUNTER — OFFICE VISIT (OUTPATIENT)
Dept: CARDIAC SURGERY | Facility: CLINIC | Age: 75
End: 2025-03-31
Payer: COMMERCIAL

## 2025-03-31 ENCOUNTER — TELEPHONE (OUTPATIENT)
Dept: CARDIOLOGY CLINIC | Facility: CLINIC | Age: 75
End: 2025-03-31

## 2025-03-31 VITALS
HEIGHT: 77 IN | BODY MASS INDEX: 21.84 KG/M2 | SYSTOLIC BLOOD PRESSURE: 146 MMHG | HEART RATE: 88 BPM | DIASTOLIC BLOOD PRESSURE: 81 MMHG | OXYGEN SATURATION: 97 % | WEIGHT: 185 LBS

## 2025-03-31 DIAGNOSIS — I35.0 SEVERE AORTIC STENOSIS: Primary | ICD-10-CM

## 2025-03-31 DIAGNOSIS — I63.9 CEREBROVASCULAR ACCIDENT (CVA), UNSPECIFIED MECHANISM (HCC): ICD-10-CM

## 2025-03-31 DIAGNOSIS — F03.A0 MILD DEMENTIA WITHOUT BEHAVIORAL DISTURBANCE, PSYCHOTIC DISTURBANCE, MOOD DISTURBANCE, OR ANXIETY, UNSPECIFIED DEMENTIA TYPE (HCC): ICD-10-CM

## 2025-03-31 PROCEDURE — 99205 OFFICE O/P NEW HI 60 MIN: CPT | Performed by: THORACIC SURGERY (CARDIOTHORACIC VASCULAR SURGERY)

## 2025-03-31 NOTE — LETTER
3/31/2025       Jon Morton              : 1950        MRN: [id number]  [address]   [address] [place] [id number]       Procedure Name: RIGHT + LEFT HEART CATHETERIZATION    Procedure date: 25    Location: [facility]  Address: [address] [place] [id number]      The hospital will contact you the day prior to your procedure, usually between 4PM - 6PM to instruct you on the time and place to report. If you do not hear from a [facility]  by 5:00PM the evening prior to your procedure, please contact the [place] that you are scheduled at.      [place]: [address] [place] [address] - [facility] [phone]     DO NOT eat or drink ANYTHING after midnight the night prior to your procedure including gum & candy.     You may have a SIP of WATER with your morning medications.     Please notify us if you have been prescribed a NEW MEDICATION prior to your procedure or admitted to the hospital within the past 30 days.    If you develop a cold, sore throat, fever or any other illness prior to your procedure date, notify your surgeon immediately.    DO NOT stop taking Plavix or Aspirin unless advised otherwise.    Arrange for a responsible person to drive you to and from the hospital.    Please shower/bathe the night before your procedure and do not use powders or lotions.    Bring a list of daily medications, vitamins, minerals, herbals and nutritional supplements you take. Include dosage and time you take them each day.    If packing an overnight bag, pack minimal clothing, you will be given hospital sleepwear. Do not bring money, valuables or jewelry. Wedding band is OK.    If you use CPAP machine, bring it to the hospital.      Bring your Photo ID and Insurance cards with you.    DO NOT take any diabetic medication, including insulin, the morning of the procedure. Oral diabetic medications may include: Glucophage, Prandin, Glyburide, Micronase,  "Avandia, Glocovance, Precose, Glynase y Starlix.    You should continue to take your morning dose of heart and/or blood pressure medications with a sip of water UNLESS ADVISED OTHERWISE.    Special Instructions:    Medication holds:   N/A    Blood Thinners:   N/A    Labs to be done on 4/3/25:  CMP / CBC (fasting 8 hours)       Hannah \"Sierra\" Grey    [facility]   [address] [place] [address]  Teams: [phone]              "

## 2025-03-31 NOTE — TELEPHONE ENCOUNTER
"Deirdre,     Please mail instruction letter to patient's home address on file.     Thanks,  Hannah \"Sierra\" Grey    "

## 2025-03-31 NOTE — PROGRESS NOTES
Consultation - Cardiac Surgery   Jon Morton 74 y.o. male MRN: [id number]    Physician Requesting Consult: Lakenibatsheva     Reason for Consult / Principal Problem: Aortic stenosis, Non-Rheumatic    History of Present Illness: Jon Morton is a 74 y.o. year old male who presents for initial outpatient surgical consultation for severe symptomatic aortic stenosis.  He does not have any shortness of breath and gets around well.  His niece tells me his physical strength is good.  He does not complain of chest pain, SOB or leg swelling.  He does have a wet cough since Friday and they are planning to go to urgent care after this visit.     Hospital Admission 11/2024 diagnosis of stroke / seizure due to him missing his carbamazepine -- had an echo ordered by hospitalist showing severe aortic stenosis   Hospital admission 2/14/2025 hematuria and visual hallucinations CT with a new bladder mass - per urology high suspicion of recurrence of his bladder cancer.  He is here with his niece who is his power of .  She lives about 5 minutes away and helps care for him day to day.  He otherwise lives at home alone.  He has a history of dementia and seizures.  He takes his medications twice a day as prescribed and his niece follows up on him.  He has moderate malnutrition uses meals on wheels, he has not cooked for himself since becoming a  about 4 years ago.  He tolerates his daily ADLs independently. But does not drive anymore with his medical history.     Dental: no native teeth    Past Medical History:  Past Medical History:   Diagnosis Date    Hx of bladder cancer     Hypercholesterolemia     Hypertension     Memory difficulties     Neuropathy     Seizures (HCC)     Stroke (HCC)      Past Surgical History:   Past Surgical History:   Procedure Laterality Date    BACK SURGERY      DENTAL SURGERY       Family History:  Family History   Problem Relation Age of Onset    Heart attack Mother     Heart disease Father      No Known Problems Sister     No Known Problems Brother     Post-traumatic stress disorder Niece     Anxiety disorder Niece     PKU Niece      Social History:    Social History     Substance and Sexual Activity   Alcohol Use Not Currently     Social History     Substance and Sexual Activity   Drug Use Never     Social History     Tobacco Use   Smoking Status Former    Current packs/day: 0.00    Types: Cigarettes    Quit date:     Years since quittin.2    Passive exposure: Past   Smokeless Tobacco Never       Home Medications:   Prior to Admission medications    Medication Sig Start Date End Date Taking? Authorizing Provider   aspirin 81 mg chewable tablet Chew 1 tablet (81 mg total) daily 24   Donnell Teague DO   atorvastatin (LIPITOR) 40 mg tablet TAKE 1 TABLET (40 MG TOTAL) BY MOUTH EVERY EVENING 24   Donnell Teague DO   carBAMazepine (TEGretol XR) 400 mg 12 hr tablet Take 1 tablet (400 mg total) by mouth 2 (two) times a day 24   Alva Babin MD   cyanocobalamin (VITAMIN B-12) 1000 MCG tablet Take 1 tablet (1,000 mcg total) by mouth daily 25   Harpal Cheung DO   donepezil (ARICEPT) 10 mg tablet Take 10 mg by mouth daily at bedtime 1 tab Daily  10/7/24   Historical Provider, MD   gabapentin (NEURONTIN) 300 mg capsule Take 300 mg by mouth daily at bedtime 1 cap nightly  Patient not taking: Reported on 3/3/2025 10/7/24   Historical Provider, MD   losartan (COZAAR) 100 MG tablet Take 100 mg by mouth daily    Historical Provider, MD   memantine (NAMENDA) 10 mg tablet Take 1 tablet by mouth 2 (two) times a day 24   Historical Provider, MD   metoprolol succinate (TOPROL-XL) 50 mg 24 hr tablet TAKE 1 TABLET (50 MG TOTAL) BY MOUTH 2 (TWO) TIMES A DAY 24   Donnell Teague DO       Allergies:  No Known Allergies    Review of Systems:     Review of Systems   Constitutional:  Positive for appetite change.        Malnourishment   HENT: Negative.     Eyes: Negative.   "  Respiratory: Negative.     Cardiovascular: Negative.    Gastrointestinal: Negative.    Endocrine: Negative.    Genitourinary:         No more hematuria, undergoing testing for recurrence of bladder cancer   Musculoskeletal: Negative.    Skin: Negative.    Allergic/Immunologic: Negative.    Neurological:  Positive for seizures.        History of strokes    Hematological: Negative.    Psychiatric/Behavioral:  Positive for confusion, decreased concentration and hallucinations.        Vital Signs:     Vitals:    03/31/25 1424 03/31/25 1433   BP: 140/79 146/81   BP Location: Left arm Right arm   Patient Position: Sitting Sitting   Cuff Size: Standard Standard   Pulse: 88    SpO2: 97%    Weight: 83.9 kg (185 lb)    Height: 6' 5\" (1.956 m)        Physical Exam:     Physical Exam  Constitutional:       General: He is not in acute distress.     Appearance: He is not toxic-appearing.      Comments: Malnourishment, ribs shown/clavicle showing through   HENT:      Head: Normocephalic and atraumatic.      Right Ear: External ear normal.      Left Ear: External ear normal.      Nose: Nose normal.      Mouth/Throat:      Mouth: Mucous membranes are dry.      Pharynx: Oropharynx is clear. No oropharyngeal exudate or posterior oropharyngeal erythema.   Eyes:      General: No scleral icterus.        Right eye: No discharge.         Left eye: No discharge.      Extraocular Movements: Extraocular movements intact.      Conjunctiva/sclera: Conjunctivae normal.      Pupils: Pupils are equal, round, and reactive to light.   Neck:      Vascular: Carotid bruit present.   Cardiovascular:      Rate and Rhythm: Normal rate and regular rhythm.      Pulses: Normal pulses.      Heart sounds: Murmur heard.   Pulmonary:      Effort: Pulmonary effort is normal.      Comments: Crackles at his right base   Abdominal:      General: Abdomen is flat. Bowel sounds are normal. There is no distension.      Palpations: Abdomen is soft.      Tenderness: " "There is no abdominal tenderness. There is no guarding.   Musculoskeletal:      Cervical back: Normal range of motion.      Right lower leg: No edema.      Left lower leg: No edema.   Skin:     General: Skin is warm and dry.      Coloration: Skin is not pale.      Findings: No erythema or rash.   Neurological:      General: No focal deficit present.      Mental Status: He is alert and oriented to person, place, and time.   Psychiatric:         Mood and Affect: Mood normal.         Behavior: Behavior normal.         Thought Content: Thought content normal.         Judgment: Judgment normal.         Lab Results:               Invalid input(s): \"LABGLOM\"      Lab Results   Component Value Date    HGBA1C 5.3 11/03/2024     Lab Results   Component Value Date    TROPONINI 0.06 (H) 09/19/2020       Imaging Studies:     Echocardiogram:     Findings    Left Ventricle Left ventricular cavity size is normal. Wall thickness is moderately increased. The left ventricular ejection fraction is 70%. Systolic function is hyperdynamic. Wall motion is normal. Unable to assess diastolic function.   Right Ventricle Right ventricular cavity size is normal. Systolic function is normal.   Left Atrium The atrium is mildly dilated.   Right Atrium The atrium is normal in size.   Atrial Septum No patent foramen ovale detected, confirmed by provocation with cough, using agitated saline contrast and with valsalva, using agitated saline contrast.   Aortic Valve The leaflets are severely calcified. The leaflets exhibit normal mobility. There is no evidence of regurgitation. There is severe stenosis. The aortic valve peak velocity is 5.62 m/s. The aortic valve mean gradient is 85 mmHg. The dimensionless velocity index is 0.18. The aortic valve area is 0.42 cm2.   Mitral Valve There is severe annular calcification.  There is mild regurgitation. There is mild stenosis. The mitral valve mean gradient is 4mmHg.   Tricuspid Valve Tricuspid valve " structure is normal. There is no evidence of regurgitation. There is no evidence of stenosis.   Pulmonic Valve Pulmonic valve structure is normal. There is no evidence of regurgitation. There is no evidence of stenosis.   Ascending Aorta The aortic root is normal in size.   IVC/SVC The right atrial pressure is estimated at 10.0 mmHg. The inferior vena cava is normal in size. Respirophasic changes were blunted (less than 50% variation).   Pericardium There is no pericardial effusion. The pericardium is normal in appearance.       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 Based on their STS risk assessment, they will undergo the following testing for transcatheter aortic valve replacement: gated CTA of the chest, abdomen, and pelvis and cardiac catheterization.    Once these studies have been completed, Jon Morton will follow up in our office to review the results and confirm the suitability of proceeding with transcatheter aortic valve replacment.    Shared decision-making encounter occurred during this visit. Additionally, heart team evaluation of suitability for surgical replacement has been  completed.      Jon Morton was comfortable with our recommendations, and their questions were answered to their satisfaction.  We will continue to evaluate the patient, with a final surgical recommendation pending the above work up.  Thank you for allowing us to participate in the care of this patient.     Routine referral to gastroenterology for colonoscopy screening was not indicated, as the patient is over 74 years old - had colonoscopy about 1 year ago    SIGNATURE: Kylah Reagan PA-C  DATE: March 31, 2025  TIME: 2:54 PM    * This note was completed in part utilizing [employer] direct voice recognition software.   Grammatical errors, random word insertion, spelling mistakes, and incomplete sentences may be an occasional consequence of the system secondary to software limitations, ambient noise and hardware issues. At the time of dictation, efforts were made to edit, clarify and /or correct errors. Please read the chart carefully and recognize, using context, where substitutions have occurred.  If you have any questions or concerns about the context, text or information contained within the body of this dictation, please contact myself, the provider, for further clarification.

## 2025-03-31 NOTE — TELEPHONE ENCOUNTER
"Patient scheduled for Right + Left Heart Cath on 4/24/25 at [facility] with Dr. Berger.      Mailed patient instructions.     Patient's niece/POA aware of all general instructions.    Medication holds:   N/A    Blood Thinners:   N/A    Labs to be done on 4/3/25:  CMP / CBC (fasting 8 hours)       Thank you,  Hannah \"Sierra\" Grey      "

## 2025-03-31 NOTE — TELEPHONE ENCOUNTER
----- Message from Paula DEWITT sent at 3/31/2025  3:55 PM EDT -----  Regarding: Cath  Please schedule patient for:     Pre TAVR Cardiac Cath: to be scheduled in the next few weeks. Patient has [employer] as primary insurance and is getting recent bloodwork.    Please schedule with either Dr. Rangel, Dr. Finch, Dr. Berger or Dr. Mathis     To be done at: [facility]     To be done by:     Please address any questions regarding this request to Paula Garcia or Belén Dhillon,     Thank you.

## 2025-04-01 ENCOUNTER — PREP FOR PROCEDURE (OUTPATIENT)
Dept: UROLOGY | Facility: AMBULATORY SURGERY CENTER | Age: 75
End: 2025-04-01

## 2025-04-01 DIAGNOSIS — Z01.810 PRE-OPERATIVE CARDIOVASCULAR EXAMINATION: ICD-10-CM

## 2025-04-01 DIAGNOSIS — N32.89 BLADDER MASS: Primary | ICD-10-CM

## 2025-04-01 DIAGNOSIS — Z01.812 PRE-OPERATIVE LABORATORY EXAMINATION: ICD-10-CM

## 2025-04-01 DIAGNOSIS — R39.89 SUSPECTED UTI: ICD-10-CM

## 2025-04-01 NOTE — TELEPHONE ENCOUNTER
"Called niece/POA Trupti and informed that received message from auth dept that patient's insurance  is not active. Trupti was not aware of insurance being inactive. Verified [employer] ID and is the same as in chart. Trupti mentioned that she's trying to get patient on with [employer] and provided them with all patient's info and insurance info and not sure it that has to do with it. Trupti mentioned that she has not called [facility] to make any change but will call [facility] now to find out what's going on and call me back.     Sent secure message to Quiana with update.     Thanks,  Hannah \"Sierra\" Grey             "

## 2025-04-07 NOTE — TELEPHONE ENCOUNTER
"Patient now has [employer] insurance. Sent secure message to Quiana to inform and to request auth.     Called [facility] and left message for Melissa to fax over lab results for CMP/CBC for upcoming heart catheterization procedure. Provided my fax# [phone].     Thanks,  Hannah \"Sierra\" Grey     "

## 2025-04-07 NOTE — TELEPHONE ENCOUNTER
"Received call from Melissa from [facility] that she faxed labs CMP/CBC.     Received labs and faxed them to [facility] to scan into chart.     Thanks,  Hannah \"Sierra\" Grey     "

## 2025-04-10 ENCOUNTER — HOSPITAL ENCOUNTER (OUTPATIENT)
Dept: RADIOLOGY | Facility: HOSPITAL | Age: 75
Discharge: HOME | End: 2025-04-10
Payer: MEDICARE

## 2025-04-10 DIAGNOSIS — I35.0 SEVERE AORTIC STENOSIS: ICD-10-CM

## 2025-04-10 DIAGNOSIS — F03.A0 MILD DEMENTIA WITHOUT BEHAVIORAL DISTURBANCE, PSYCHOTIC DISTURBANCE, MOOD DISTURBANCE, OR ANXIETY, UNSPECIFIED DEMENTIA TYPE (HCC): ICD-10-CM

## 2025-04-10 PROCEDURE — 75572 CT HRT W/3D IMAGE: CPT

## 2025-04-10 PROCEDURE — 74174 CTA ABD&PLVS W/CONTRAST: CPT

## 2025-04-10 RX ADMIN — IOHEXOL 85 ML: 350 INJECTION, SOLUTION INTRAVENOUS at 11:01

## 2025-04-17 ENCOUNTER — OFFICE VISIT (OUTPATIENT)
Dept: CARDIOLOGY CLINIC | Facility: CLINIC | Age: 75
End: 2025-04-17
Payer: MEDICARE

## 2025-04-17 VITALS
WEIGHT: 193.6 LBS | DIASTOLIC BLOOD PRESSURE: 82 MMHG | BODY MASS INDEX: 22.86 KG/M2 | HEIGHT: 77 IN | HEART RATE: 74 BPM | SYSTOLIC BLOOD PRESSURE: 156 MMHG

## 2025-04-17 DIAGNOSIS — Z01.810 PRE-OPERATIVE CARDIOVASCULAR EXAMINATION: ICD-10-CM

## 2025-04-17 DIAGNOSIS — I35.0 SEVERE AORTIC STENOSIS: Primary | ICD-10-CM

## 2025-04-17 DIAGNOSIS — N32.89 BLADDER MASS: ICD-10-CM

## 2025-04-17 DIAGNOSIS — I10 PRIMARY HYPERTENSION: ICD-10-CM

## 2025-04-17 DIAGNOSIS — I63.9 CEREBROVASCULAR ACCIDENT (CVA), UNSPECIFIED MECHANISM (HCC): ICD-10-CM

## 2025-04-17 DIAGNOSIS — R42 DIZZINESS: ICD-10-CM

## 2025-04-17 LAB
ATRIAL RATE: 74 BPM
P AXIS: 89 DEGREES
PR INTERVAL: 148 MS
QRS AXIS: 42 DEGREES
QRSD INTERVAL: 90 MS
QT INTERVAL: 402 MS
QTC INTERVAL: 446 MS
T WAVE AXIS: 71 DEGREES
VENTRICULAR RATE: 74 BPM

## 2025-04-17 PROCEDURE — 93000 ELECTROCARDIOGRAM COMPLETE: CPT | Performed by: INTERNAL MEDICINE

## 2025-04-17 PROCEDURE — 99205 OFFICE O/P NEW HI 60 MIN: CPT | Performed by: INTERNAL MEDICINE

## 2025-04-17 NOTE — PROGRESS NOTES
"Cardiology   Jon Morton 74 y.o. male MRN: [id number]   Encounter: [id number]        Reason for Consult / Principal Problem: Preoperative risk evaluation    Physician Requesting Consult: Gerald Cardenas MD    PCP: Sb Crawford MD        Assessment:    Assessment & Plan  Bladder mass    Pre-operative cardiovascular examination    Dizziness    Severe aortic stenosis    Cerebrovascular accident (CVA), unspecified mechanism (HCC)    Primary hypertension           Plan:    -Patient is high risk for any kind of noncardiac surgery  - If needed can hold aspirin 1 week prior to bladder surgery assuming that no intervention is performed at his left heart catheterization on 29 April.  - If feasible would defer his bladder surgery until after his valve is fixed although in that situation managing his antiplatelet therapy would require cardiothoracic surgery input.  - If bladder surgery is urgent then would proceed with cardiac anesthesia, with intra and preoperative cardiac monitoring and hemodynamic monitoring (CVP and arterial line monitoring) and avoidance of intraoperative or postoperative hypotension or volume depletion.  -Will reach out to urology and CT surgery  -Continue metoprolol and atorvastatin  - Hold losartan 48 hours prior to surgery  -Return to office in 3 months      CC: Preoperative risk evaluation    HPI  74 y.o. male presents for preoperative risk evaluation for bladder cancer surgery.  He has severe (critical) aortic stenosis.  His exercise capacity is limited by dizziness likely from his aortic stenosis.  He does not have any chest pain or heart failure symptoms.  He does have dementia but is able to perform his ADLs.              Physical exam  Objective   Vitals: Blood pressure 156/82, pulse 74, height 6' 5\" (1.956 m), weight 87.8 kg (193 lb 9.6 oz).    General:  AO x3, no acute distress  Cardiac:  S1-S2 normal, 4/6 systolic murmur best heard in the apical region JVP: normal  Lungs:  Clear to auscultation " bilaterally, no wheezing or crackles.  Abdomen:  Soft, nontender, nondistended.  Extremities:  Warm, well perfused, pulses palpable. Edema:absent  Neuro: Grossly nonfocal        ======================================================  TREADMILL STRESS  No results found for this or any previous visit.     ----------------------------------------------------------------------------------------------  NUCLEAR STRESS TEST: No results found for this or any previous visit.    No results found for this or any previous visit.      --------------------------------------------------------------------------------  CATH:  No results found for this or any previous visit.    --------------------------------------------------------------------------------  ECHO:   No results found for this or any previous visit.    No results found for this or any previous visit.    --------------------------------------------------------------------------------  HOLTER  No results found for this or any previous visit.    --------------------------------------------------------------------------------  CAROTIDS  Results for orders placed during the hospital encounter of 11/02/24    VAS carotid complete study    Narrative  [facility] REPORT  CLINICAL:  Indications:  Patient presents to evaluate for carotid artery stenosis s/p recent possible  TIA/CVA.  The patient reports he is currently asymptomatic.  Operative History:  No cardiovascular surgeries noted  Risk Factors  The patient has history of HTN.  Clinical  Right Pressure:  124/70 mm Hg, Left Pressure:  138/78 mm Hg.    FINDINGS:    Right        Impression  PSV  EDV (cm/s)  Direction of Flow  Ratio  Dist. ICA                 74          27                      0.60  Mid. ICA                  95          34                      0.77  Prox. ICA    1 - 49%     100          30                      0.81  Dist CCA                  72          18  Mid CCA                  123          21                       1.10  Prox CCA                 113          27  Ext Carotid               83          10                      0.67  Prox Vert                 70          20  Antegrade  Subclavian               103          23    Left         Impression  PSV  EDV (cm/s)  Direction of Flow  Ratio  Dist. ICA                 73          24                      0.67  Mid. ICA                  76          29                      0.69  Prox. ICA    50 - 69%    206          54                      1.88  Dist CCA                 109          20  Mid CCA                  109          24                      1.07  Prox CCA                 102          26  Ext Carotid              102          11                      0.93  Prox Vert                 53          15  Antegrade  Subclavian               110           9        CONCLUSION:    Impression  RIGHT:  There is <50% stenosis noted in the internal carotid artery. Plaque is  heterogenous and irregular.  Vertebral artery flow is antegrade. There is no significant subclavian artery  disease.    LEFT:  There is 50-69% stenosis noted in the internal carotid artery. Plaque is  heterogenous and irregular.  Vertebral artery flow is antegrade. There is no significant subclavian artery  disease.    No previous study for comparison.  Recommend repeat testing in 6 months as per protocol unless otherwise  indicated.    SIGNATURE:  Electronically Signed by: SAMSON CAMPOS MD on 2024-11-04 08:02:59 PM       Diagnoses and all orders for this visit:    Severe aortic stenosis    Bladder mass  -     Ambulatory referral to Cardiology    Pre-operative cardiovascular examination  -     Ambulatory referral to Cardiology    Dizziness  -     POCT ECG    Cerebrovascular accident (CVA), unspecified mechanism (HCC)    Primary hypertension       ======================================================          Review of Systems  ROS as noted above, otherwise 12 point review of systems was performed and is  "negative.     Historical Information   Past Medical History:   Diagnosis Date    Aortic stenosis     Bladder cancer (HCC)     Dementia (HCC)     Hx of bladder cancer     Hypercholesterolemia     Hyperlipidemia     Hypertension     Memory difficulties     Neuropathy     Seizures (HCC)     Stroke (HCC)      Past Surgical History:   Procedure Laterality Date    BACK SURGERY      DENTAL SURGERY       Social History     Substance and Sexual Activity   Alcohol Use Not Currently     Social History     Substance and Sexual Activity   Drug Use Never     Social History     Tobacco Use   Smoking Status Former    Current packs/day: 0.00    Types: Cigarettes    Quit date:     Years since quittin.3    Passive exposure: Past   Smokeless Tobacco Never     Family History   Problem Relation Age of Onset    Heart attack Mother     Heart disease Father     No Known Problems Sister     No Known Problems Brother     Post-traumatic stress disorder Niece     Anxiety disorder Niece     PKU Niece        Meds/Allergies   Hospital Medications: No current facility-administered medications for this visit.  Home Medications: Not in a hospital admission.    No Known Allergies      Portions of the record may have been created with voice recognition software.  Occasional wrong words or \"sound a like\" substitutions may have occurred due to the inherent limitations of voice recognition software.  Read the chart carefully and recognize, using context, where substitutions have occurred.        I have spent a total of 65 min in reviewing and/or ordering tests, medications, or procedures, performing an examination or evaluation, reviewing pertinent history, counseling and educating the patient, referring and/or communicating with other health care professionals, documenting in the EMR and general coordination of care of the patient today.               "

## 2025-04-17 NOTE — H&P (VIEW-ONLY)
"Cardiology   Jon Morton 74 y.o. male MRN: [id number]   Encounter: [id number]        Reason for Consult / Principal Problem: Preoperative risk evaluation    Physician Requesting Consult: Gerald Cardenas MD    PCP: Sb Crawford MD        Assessment:    Assessment & Plan  Bladder mass    Pre-operative cardiovascular examination    Dizziness    Severe aortic stenosis    Cerebrovascular accident (CVA), unspecified mechanism (HCC)    Primary hypertension           Plan:    -Patient is high risk for any kind of noncardiac surgery  - If needed can hold aspirin 1 week prior to bladder surgery assuming that no intervention is performed at his left heart catheterization on 29 April.  - If feasible would defer his bladder surgery until after his valve is fixed although in that situation managing his antiplatelet therapy would require cardiothoracic surgery input.  - If bladder surgery is urgent then would proceed with cardiac anesthesia, with intra and preoperative cardiac monitoring and hemodynamic monitoring (CVP and arterial line monitoring) and avoidance of intraoperative or postoperative hypotension or volume depletion.  -Will reach out to urology and CT surgery  -Continue metoprolol and atorvastatin  - Hold losartan 48 hours prior to surgery  -Return to office in 3 months      CC: Preoperative risk evaluation    HPI  74 y.o. male presents for preoperative risk evaluation for bladder cancer surgery.  He has severe (critical) aortic stenosis.  His exercise capacity is limited by dizziness likely from his aortic stenosis.  He does not have any chest pain or heart failure symptoms.  He does have dementia but is able to perform his ADLs.              Physical exam  Objective   Vitals: Blood pressure 156/82, pulse 74, height 6' 5\" (1.956 m), weight 87.8 kg (193 lb 9.6 oz).    General:  AO x3, no acute distress  Cardiac:  S1-S2 normal, 4/6 systolic murmur best heard in the apical region JVP: normal  Lungs:  Clear to auscultation " bilaterally, no wheezing or crackles.  Abdomen:  Soft, nontender, nondistended.  Extremities:  Warm, well perfused, pulses palpable. Edema:absent  Neuro: Grossly nonfocal        ======================================================  TREADMILL STRESS  No results found for this or any previous visit.     ----------------------------------------------------------------------------------------------  NUCLEAR STRESS TEST: No results found for this or any previous visit.    No results found for this or any previous visit.      --------------------------------------------------------------------------------  CATH:  No results found for this or any previous visit.    --------------------------------------------------------------------------------  ECHO:   No results found for this or any previous visit.    No results found for this or any previous visit.    --------------------------------------------------------------------------------  HOLTER  No results found for this or any previous visit.    --------------------------------------------------------------------------------  CAROTIDS  Results for orders placed during the hospital encounter of 11/02/24    VAS carotid complete study    Narrative  [facility] REPORT  CLINICAL:  Indications:  Patient presents to evaluate for carotid artery stenosis s/p recent possible  TIA/CVA.  The patient reports he is currently asymptomatic.  Operative History:  No cardiovascular surgeries noted  Risk Factors  The patient has history of HTN.  Clinical  Right Pressure:  124/70 mm Hg, Left Pressure:  138/78 mm Hg.    FINDINGS:    Right        Impression  PSV  EDV (cm/s)  Direction of Flow  Ratio  Dist. ICA                 74          27                      0.60  Mid. ICA                  95          34                      0.77  Prox. ICA    1 - 49%     100          30                      0.81  Dist CCA                  72          18  Mid CCA                  123          21                       1.10  Prox CCA                 113          27  Ext Carotid               83          10                      0.67  Prox Vert                 70          20  Antegrade  Subclavian               103          23    Left         Impression  PSV  EDV (cm/s)  Direction of Flow  Ratio  Dist. ICA                 73          24                      0.67  Mid. ICA                  76          29                      0.69  Prox. ICA    50 - 69%    206          54                      1.88  Dist CCA                 109          20  Mid CCA                  109          24                      1.07  Prox CCA                 102          26  Ext Carotid              102          11                      0.93  Prox Vert                 53          15  Antegrade  Subclavian               110           9        CONCLUSION:    Impression  RIGHT:  There is <50% stenosis noted in the internal carotid artery. Plaque is  heterogenous and irregular.  Vertebral artery flow is antegrade. There is no significant subclavian artery  disease.    LEFT:  There is 50-69% stenosis noted in the internal carotid artery. Plaque is  heterogenous and irregular.  Vertebral artery flow is antegrade. There is no significant subclavian artery  disease.    No previous study for comparison.  Recommend repeat testing in 6 months as per protocol unless otherwise  indicated.    SIGNATURE:  Electronically Signed by: SAMSON CAMPOS MD on 2024-11-04 08:02:59 PM       Diagnoses and all orders for this visit:    Severe aortic stenosis    Bladder mass  -     Ambulatory referral to Cardiology    Pre-operative cardiovascular examination  -     Ambulatory referral to Cardiology    Dizziness  -     POCT ECG    Cerebrovascular accident (CVA), unspecified mechanism (HCC)    Primary hypertension       ======================================================          Review of Systems  ROS as noted above, otherwise 12 point review of systems was performed and is  "negative.     Historical Information   Past Medical History:   Diagnosis Date    Aortic stenosis     Bladder cancer (HCC)     Dementia (HCC)     Hx of bladder cancer     Hypercholesterolemia     Hyperlipidemia     Hypertension     Memory difficulties     Neuropathy     Seizures (HCC)     Stroke (HCC)      Past Surgical History:   Procedure Laterality Date    BACK SURGERY      DENTAL SURGERY       Social History     Substance and Sexual Activity   Alcohol Use Not Currently     Social History     Substance and Sexual Activity   Drug Use Never     Social History     Tobacco Use   Smoking Status Former    Current packs/day: 0.00    Types: Cigarettes    Quit date:     Years since quittin.3    Passive exposure: Past   Smokeless Tobacco Never     Family History   Problem Relation Age of Onset    Heart attack Mother     Heart disease Father     No Known Problems Sister     No Known Problems Brother     Post-traumatic stress disorder Niece     Anxiety disorder Niece     PKU Niece        Meds/Allergies   Hospital Medications: No current facility-administered medications for this visit.  Home Medications: Not in a hospital admission.    No Known Allergies      Portions of the record may have been created with voice recognition software.  Occasional wrong words or \"sound a like\" substitutions may have occurred due to the inherent limitations of voice recognition software.  Read the chart carefully and recognize, using context, where substitutions have occurred.        I have spent a total of 65 min in reviewing and/or ordering tests, medications, or procedures, performing an examination or evaluation, reviewing pertinent history, counseling and educating the patient, referring and/or communicating with other health care professionals, documenting in the EMR and general coordination of care of the patient today.               "

## 2025-04-17 NOTE — LETTER
April 17, 2025     Gerald Cardenas MD  [address]  [address]  [facility] [address]    Patient: Jon Morton   YOB: 1950   Date of Visit: 4/17/2025       Dear MD Sb Ramos MD Cheryl Ann Lugiano, PA-C William Gioia, DO:    Thank you for referring Jon Morton to me for evaluation. Below are my notes for this consultation.    If you have questions, please do not hesitate to call me. I look forward to following your patient along with you.         Sincerely,        Izabella Zavala MD        CC: MD Kylah Kat PA-C William Gioia, DO Purujit Thacker, MD  4/17/2025 11:51 AM  Sign when Signing Visit  Cardiology   Jon Morton 74 y.o. male MRN: [id number]   Encounter: [id number]        Reason for Consult / Principal Problem: Preoperative risk evaluation    Physician Requesting Consult: Gerald Cardenas MD    PCP: Sb Crawford MD        Assessment:    Assessment & Plan  Bladder mass    Pre-operative cardiovascular examination    Dizziness    Severe aortic stenosis    Cerebrovascular accident (CVA), unspecified mechanism (HCC)    Primary hypertension           Plan:    -Patient is high risk for any kind of noncardiac surgery  - If needed can hold aspirin 1 week prior to bladder surgery assuming that no intervention is performed at his left heart catheterization on 29 April.  - If feasible would defer his bladder surgery until after his valve is fixed although in that situation managing his antiplatelet therapy would require cardiothoracic surgery input.  - If bladder surgery is urgent then would proceed with cardiac anesthesia, with intra and preoperative cardiac monitoring and hemodynamic monitoring (CVP and arterial line monitoring) and avoidance of intraoperative or postoperative hypotension or volume depletion.  -Will reach out to urology and CT surgery  -Continue metoprolol and atorvastatin  - Hold losartan 48 hours prior to surgery  -Return to office in 3  "months      CC: Preoperative risk evaluation    HPI  74 y.o. male presents for preoperative risk evaluation for bladder cancer surgery.  He has severe (critical) aortic stenosis.  His exercise capacity is limited by dizziness likely from his aortic stenosis.  He does not have any chest pain or heart failure symptoms.  He does have dementia but is able to perform his ADLs.              Physical exam  Objective  Vitals: Blood pressure 156/82, pulse 74, height 6' 5\" (1.956 m), weight 87.8 kg (193 lb 9.6 oz).    General:  AO x3, no acute distress  Cardiac:  S1-S2 normal, 4/6 systolic murmur best heard in the apical region JVP: normal  Lungs:  Clear to auscultation bilaterally, no wheezing or crackles.  Abdomen:  Soft, nontender, nondistended.  Extremities:  Warm, well perfused, pulses palpable. Edema:absent  Neuro: Grossly nonfocal        ======================================================  TREADMILL STRESS  No results found for this or any previous visit.     ----------------------------------------------------------------------------------------------  NUCLEAR STRESS TEST: No results found for this or any previous visit.    No results found for this or any previous visit.      --------------------------------------------------------------------------------  CATH:  No results found for this or any previous visit.    --------------------------------------------------------------------------------  ECHO:   No results found for this or any previous visit.    No results found for this or any previous visit.    --------------------------------------------------------------------------------  HOLTER  No results found for this or any previous visit.    --------------------------------------------------------------------------------  CAROTIDS  Results for orders placed during the hospital encounter of 11/02/24    VAS carotid complete study    Narrative  [facility]PORT  CLINICAL:  Indications:  Patient presents " to evaluate for carotid artery stenosis s/p recent possible  TIA/CVA.  The patient reports he is currently asymptomatic.  Operative History:  No cardiovascular surgeries noted  Risk Factors  The patient has history of HTN.  Clinical  Right Pressure:  124/70 mm Hg, Left Pressure:  138/78 mm Hg.    FINDINGS:    Right        Impression  PSV  EDV (cm/s)  Direction of Flow  Ratio  Dist. ICA                 74          27                      0.60  Mid. ICA                  95          34                      0.77  Prox. ICA    1 - 49%     100          30                      0.81  Dist CCA                  72          18  Mid CCA                  123          21                      1.10  Prox CCA                 113          27  Ext Carotid               83          10                      0.67  Prox Vert                 70          20  Antegrade  Subclavian               103          23    Left         Impression  PSV  EDV (cm/s)  Direction of Flow  Ratio  Dist. ICA                 73          24                      0.67  Mid. ICA                  76          29                      0.69  Prox. ICA    50 - 69%    206          54                      1.88  Dist CCA                 109          20  Mid CCA                  109          24                      1.07  Prox CCA                 102          26  Ext Carotid              102          11                      0.93  Prox Vert                 53          15  Antegrade  Subclavian               110           9        CONCLUSION:    Impression  RIGHT:  There is <50% stenosis noted in the internal carotid artery. Plaque is  heterogenous and irregular.  Vertebral artery flow is antegrade. There is no significant subclavian artery  disease.    LEFT:  There is 50-69% stenosis noted in the internal carotid artery. Plaque is  heterogenous and irregular.  Vertebral artery flow is antegrade. There is no significant subclavian artery  disease.    No previous study for  comparison.  Recommend repeat testing in 6 months as per protocol unless otherwise  indicated.    SIGNATURE:  Electronically Signed by: SAMSON CAMPOS MD on 2024 08:02:59 PM       Diagnoses and all orders for this visit:    Severe aortic stenosis    Bladder mass  -     Ambulatory referral to Cardiology    Pre-operative cardiovascular examination  -     Ambulatory referral to Cardiology    Dizziness  -     POCT ECG    Cerebrovascular accident (CVA), unspecified mechanism (HCC)    Primary hypertension       ======================================================          Review of Systems  ROS as noted above, otherwise 12 point review of systems was performed and is negative.     Historical Information  Past Medical History:   Diagnosis Date   • Aortic stenosis    • Bladder cancer (HCC)    • Dementia (HCC)    • Hx of bladder cancer    • Hypercholesterolemia    • Hyperlipidemia    • Hypertension    • Memory difficulties    • Neuropathy    • Seizures (HCC)    • Stroke (HCC)      Past Surgical History:   Procedure Laterality Date   • BACK SURGERY     • DENTAL SURGERY       Social History     Substance and Sexual Activity   Alcohol Use Not Currently     Social History     Substance and Sexual Activity   Drug Use Never     Social History     Tobacco Use   Smoking Status Former   • Current packs/day: 0.00   • Types: Cigarettes   • Quit date:    • Years since quittin.3   • Passive exposure: Past   Smokeless Tobacco Never     Family History   Problem Relation Age of Onset   • Heart attack Mother    • Heart disease Father    • No Known Problems Sister    • No Known Problems Brother    • Post-traumatic stress disorder Niece    • Anxiety disorder Niece    • PKU Niece        Meds/Allergies  Hospital Medications: No current facility-administered medications for this visit.  Home Medications: Not in a hospital admission.    No Known Allergies      Portions of the record may have been created with voice recognition  "software.  Occasional wrong words or \"sound a like\" substitutions may have occurred due to the inherent limitations of voice recognition software.  Read the chart carefully and recognize, using context, where substitutions have occurred.        I have spent a total of 65 min in reviewing and/or ordering tests, medications, or procedures, performing an examination or evaluation, reviewing pertinent history, counseling and educating the patient, referring and/or communicating with other health care professionals, documenting in the EMR and general coordination of care of the patient today.               "

## 2025-04-18 ENCOUNTER — TELEPHONE (OUTPATIENT)
Dept: UROLOGY | Facility: CLINIC | Age: 75
End: 2025-04-18

## 2025-04-18 NOTE — TELEPHONE ENCOUNTER
I spoke with pt's niece. Based on cardiology recommendations will plan to hold off on TURBT until after TAVR and ok to hold antiplatelet medication.    Will arrange for office cystoscopy to evaluate tumor.    Pt's niece is in agreement.

## 2025-04-23 ENCOUNTER — TELEPHONE (OUTPATIENT)
Age: 75
End: 2025-04-23

## 2025-04-23 NOTE — TELEPHONE ENCOUNTER
Left message for Trupti advising that patient needs carbamazepine level, total done prior to appointment. Neurology number provided should she have questions.  Also reminded her of appointment date and time.

## 2025-04-24 NOTE — TELEPHONE ENCOUNTER
Recd call from Trupti, pt's niece stating she is returning a call to the office from a Sara. Trupti wanted to verify labs that were ordered and if pt needed to fast prior to getting labs. Stated that the CBC and CMP do require to fast at least 8 hours before getting blood drawn and advised to have pt get labs drawn before taking AM dose of Carbamazepine. Stated pt can take the med after labs are drawn. Reported that pt takes his AM meds at 0900 and that pt would be getting labs drawn before then. No other questions at this time.

## 2025-04-25 DIAGNOSIS — Z86.73 CHRONIC CEREBROVASCULAR ACCIDENT (CVA): ICD-10-CM

## 2025-04-25 DIAGNOSIS — I10 PRIMARY HYPERTENSION: ICD-10-CM

## 2025-04-25 RX ORDER — METOPROLOL SUCCINATE 50 MG/1
50 TABLET, EXTENDED RELEASE ORAL 2 TIMES DAILY
Qty: 60 TABLET | Refills: 5 | OUTPATIENT
Start: 2025-04-25

## 2025-04-25 RX ORDER — ATORVASTATIN CALCIUM 40 MG/1
40 TABLET, FILM COATED ORAL EVERY EVENING
Qty: 30 TABLET | Refills: 5 | OUTPATIENT
Start: 2025-04-25

## 2025-04-28 DIAGNOSIS — I10 PRIMARY HYPERTENSION: ICD-10-CM

## 2025-04-28 DIAGNOSIS — Z86.73 CHRONIC CEREBROVASCULAR ACCIDENT (CVA): ICD-10-CM

## 2025-04-29 ENCOUNTER — HOSPITAL ENCOUNTER (OUTPATIENT)
Facility: HOSPITAL | Age: 75
Setting detail: OUTPATIENT SURGERY
Discharge: HOME | End: 2025-04-29
Attending: INTERNAL MEDICINE | Admitting: INTERNAL MEDICINE
Payer: MEDICARE

## 2025-04-29 VITALS
DIASTOLIC BLOOD PRESSURE: 78 MMHG | TEMPERATURE: 97.5 F | RESPIRATION RATE: 18 BRPM | HEIGHT: 77 IN | HEART RATE: 72 BPM | OXYGEN SATURATION: 97 % | WEIGHT: 193 LBS | BODY MASS INDEX: 22.79 KG/M2 | SYSTOLIC BLOOD PRESSURE: 137 MMHG

## 2025-04-29 DIAGNOSIS — I35.0 SEVERE AORTIC STENOSIS: ICD-10-CM

## 2025-04-29 PROBLEM — Z98.890 S/P CARDIAC CATH: Status: ACTIVE | Noted: 2025-04-29

## 2025-04-29 LAB
ANION GAP SERPL CALCULATED.3IONS-SCNC: 8 MMOL/L (ref 4–13)
ATRIAL RATE: 77 BPM
BUN SERPL-MCNC: 21 MG/DL (ref 5–25)
CALCIUM SERPL-MCNC: 8.9 MG/DL (ref 8.4–10.2)
CARBAMAZEPINE SERPL-MCNC: 8.6 UG/ML (ref 4–12)
CHLORIDE SERPL-SCNC: 104 MMOL/L (ref 96–108)
CO2 SERPL-SCNC: 26 MMOL/L (ref 21–32)
CREAT SERPL-MCNC: 0.98 MG/DL (ref 0.6–1.3)
ERYTHROCYTE [DISTWIDTH] IN BLOOD BY AUTOMATED COUNT: 14.5 % (ref 11.6–15.1)
GFR SERPL CREATININE-BSD FRML MDRD: 75 ML/MIN/1.73SQ M
GLUCOSE P FAST SERPL-MCNC: 94 MG/DL (ref 65–99)
GLUCOSE SERPL-MCNC: 94 MG/DL (ref 65–140)
HCT VFR BLD AUTO: 42.7 % (ref 36.5–49.3)
HGB BLD-MCNC: 14.4 G/DL (ref 12–17)
MCH RBC QN AUTO: 31.9 PG (ref 26.8–34.3)
MCHC RBC AUTO-ENTMCNC: 33.7 G/DL (ref 31.4–37.4)
MCV RBC AUTO: 95 FL (ref 82–98)
P AXIS: 78 DEGREES
PLATELET # BLD AUTO: 150 THOUSANDS/UL (ref 149–390)
PMV BLD AUTO: 9.4 FL (ref 8.9–12.7)
POTASSIUM SERPL-SCNC: 4.1 MMOL/L (ref 3.5–5.3)
PR INTERVAL: 140 MS
QRS AXIS: 39 DEGREES
QRSD INTERVAL: 98 MS
QT INTERVAL: 406 MS
QTC INTERVAL: 460 MS
RBC # BLD AUTO: 4.51 MILLION/UL (ref 3.88–5.62)
SODIUM SERPL-SCNC: 138 MMOL/L (ref 135–147)
T WAVE AXIS: 88 DEGREES
VENTRICULAR RATE: 77 BPM
WBC # BLD AUTO: 6.77 THOUSAND/UL (ref 4.31–10.16)

## 2025-04-29 PROCEDURE — 93010 ELECTROCARDIOGRAM REPORT: CPT | Performed by: INTERNAL MEDICINE

## 2025-04-29 PROCEDURE — 99153 MOD SED SAME PHYS/QHP EA: CPT | Performed by: INTERNAL MEDICINE

## 2025-04-29 PROCEDURE — 80156 ASSAY CARBAMAZEPINE TOTAL: CPT | Performed by: INTERNAL MEDICINE

## 2025-04-29 PROCEDURE — 93005 ELECTROCARDIOGRAM TRACING: CPT

## 2025-04-29 PROCEDURE — C1769 GUIDE WIRE: HCPCS | Performed by: INTERNAL MEDICINE

## 2025-04-29 PROCEDURE — C1894 INTRO/SHEATH, NON-LASER: HCPCS | Performed by: INTERNAL MEDICINE

## 2025-04-29 PROCEDURE — 85027 COMPLETE CBC AUTOMATED: CPT | Performed by: INTERNAL MEDICINE

## 2025-04-29 PROCEDURE — 99152 MOD SED SAME PHYS/QHP 5/>YRS: CPT | Performed by: INTERNAL MEDICINE

## 2025-04-29 PROCEDURE — 93454 CORONARY ARTERY ANGIO S&I: CPT | Performed by: INTERNAL MEDICINE

## 2025-04-29 PROCEDURE — 80048 BASIC METABOLIC PNL TOTAL CA: CPT | Performed by: INTERNAL MEDICINE

## 2025-04-29 RX ORDER — METOPROLOL SUCCINATE 50 MG/1
50 TABLET, EXTENDED RELEASE ORAL 2 TIMES DAILY
Qty: 60 TABLET | Refills: 5 | OUTPATIENT
Start: 2025-04-29

## 2025-04-29 RX ORDER — ACETAMINOPHEN 325 MG/1
975 TABLET ORAL ONCE AS NEEDED
Status: CANCELLED | OUTPATIENT
Start: 2025-04-29

## 2025-04-29 RX ORDER — ATORVASTATIN CALCIUM 40 MG/1
40 TABLET, FILM COATED ORAL EVERY EVENING
Qty: 30 TABLET | Refills: 5 | OUTPATIENT
Start: 2025-04-29

## 2025-04-29 RX ORDER — NITROGLYCERIN 20 MG/100ML
INJECTION INTRAVENOUS CODE/TRAUMA/SEDATION MEDICATION
Status: DISCONTINUED | OUTPATIENT
Start: 2025-04-29 | End: 2025-04-29 | Stop reason: HOSPADM

## 2025-04-29 RX ORDER — FENTANYL CITRATE 50 UG/ML
INJECTION, SOLUTION INTRAMUSCULAR; INTRAVENOUS CODE/TRAUMA/SEDATION MEDICATION
Status: DISCONTINUED | OUTPATIENT
Start: 2025-04-29 | End: 2025-04-29 | Stop reason: HOSPADM

## 2025-04-29 RX ORDER — LIDOCAINE HYDROCHLORIDE 10 MG/ML
INJECTION, SOLUTION EPIDURAL; INFILTRATION; INTRACAUDAL; PERINEURAL CODE/TRAUMA/SEDATION MEDICATION
Status: DISCONTINUED | OUTPATIENT
Start: 2025-04-29 | End: 2025-04-29 | Stop reason: HOSPADM

## 2025-04-29 RX ORDER — SODIUM CHLORIDE 9 MG/ML
125 INJECTION, SOLUTION INTRAVENOUS CONTINUOUS
Status: CANCELLED | OUTPATIENT
Start: 2025-04-29 | End: 2025-04-29

## 2025-04-29 RX ORDER — MIDAZOLAM HYDROCHLORIDE 2 MG/2ML
INJECTION, SOLUTION INTRAMUSCULAR; INTRAVENOUS CODE/TRAUMA/SEDATION MEDICATION
Status: DISCONTINUED | OUTPATIENT
Start: 2025-04-29 | End: 2025-04-29 | Stop reason: HOSPADM

## 2025-04-29 RX ORDER — VERAPAMIL HYDROCHLORIDE 2.5 MG/ML
INJECTION INTRAVENOUS CODE/TRAUMA/SEDATION MEDICATION
Status: DISCONTINUED | OUTPATIENT
Start: 2025-04-29 | End: 2025-04-29 | Stop reason: HOSPADM

## 2025-04-29 RX ORDER — SODIUM CHLORIDE 9 MG/ML
125 INJECTION, SOLUTION INTRAVENOUS CONTINUOUS
Status: DISCONTINUED | OUTPATIENT
Start: 2025-04-29 | End: 2025-04-29 | Stop reason: HOSPADM

## 2025-04-29 RX ORDER — ONDANSETRON 2 MG/ML
4 INJECTION INTRAMUSCULAR; INTRAVENOUS ONCE AS NEEDED
Status: CANCELLED | OUTPATIENT
Start: 2025-04-29

## 2025-04-29 RX ORDER — HEPARIN SODIUM 1000 [USP'U]/ML
INJECTION, SOLUTION INTRAVENOUS; SUBCUTANEOUS CODE/TRAUMA/SEDATION MEDICATION
Status: DISCONTINUED | OUTPATIENT
Start: 2025-04-29 | End: 2025-04-29 | Stop reason: HOSPADM

## 2025-04-29 RX ORDER — ASPIRIN 81 MG/1
324 TABLET, CHEWABLE ORAL ONCE
Status: COMPLETED | OUTPATIENT
Start: 2025-04-29 | End: 2025-04-29

## 2025-04-29 RX ADMIN — SODIUM CHLORIDE 125 ML/HR: 0.9 INJECTION, SOLUTION INTRAVENOUS at 07:21

## 2025-04-29 RX ADMIN — ASPIRIN 81 MG CHEWABLE TABLET 324 MG: 81 TABLET CHEWABLE at 07:21

## 2025-04-29 NOTE — INTERVAL H&P NOTE
Patient seen and examined at bedside.    Vitals:    04/29/25 0734   BP: (!) 176/99   Pulse: 78   Resp: 16   Temp: 97.6 °F (36.4 °C)   SpO2: 97%       Lab Results   Component Value Date    WBC 6.77 04/29/2025    HGB 14.4 04/29/2025    HCT 42.7 04/29/2025    MCV 95 04/29/2025     04/29/2025       Lab Results   Component Value Date    SODIUM 138 04/29/2025    K 4.1 04/29/2025     04/29/2025    CO2 26 04/29/2025    BUN 21 04/29/2025    CREATININE 0.98 04/29/2025    GLUC 94 04/29/2025    CALCIUM 8.9 04/29/2025     Brief overview of procedure discussed with patient.  Indication, risk, benefits and alternatives discussed with patient and niece who verbalized understanding.  All questions addressed fully.  Patient elected to proceed with planned procedure, consent completed.    Patient remains clinically stable.  Renal function, coagulation profile, and hemoglobin all within normal limits.    We will proceed with planned procedure.    Yandel Novoa 04/29/25 8:20 AM

## 2025-04-29 NOTE — DISCHARGE INSTR - AVS FIRST PAGE
1. Please see the post cardiac catheterization dishcarge instructions.   No heavy lifting, greater than 10 lbs. or strenuous  activity for 48 hrs.    2.Remove band aid tomorrow.  Shower and wash area- wrist gently with soap and water- beginning tomorrow. Rinse and pat dry.  Apply new water seal band aid.  Repeat this process for 5 days. No powders, creams lotions or antibiotic ointments  for 5 days.  No tub baths, hot tubs or swimming for 5 days.     3. Please call our office ([phone]) if you have any fever, redness, swelling, discharge from your wrist access site.    4.No driving for 1 day

## 2025-05-01 ENCOUNTER — TELEPHONE (OUTPATIENT)
Age: 75
End: 2025-05-01

## 2025-05-01 ENCOUNTER — OFFICE VISIT (OUTPATIENT)
Age: 75
End: 2025-05-01
Payer: MEDICARE

## 2025-05-01 VITALS
HEART RATE: 77 BPM | BODY MASS INDEX: 23.14 KG/M2 | WEIGHT: 196 LBS | HEIGHT: 77 IN | SYSTOLIC BLOOD PRESSURE: 148 MMHG | OXYGEN SATURATION: 97 % | DIASTOLIC BLOOD PRESSURE: 86 MMHG

## 2025-05-01 DIAGNOSIS — G40.919 BREAKTHROUGH SEIZURE (HCC): ICD-10-CM

## 2025-05-01 DIAGNOSIS — R56.9 SEIZURE (HCC): ICD-10-CM

## 2025-05-01 DIAGNOSIS — R20.0 NUMBNESS AND TINGLING IN LEFT HAND: Primary | ICD-10-CM

## 2025-05-01 DIAGNOSIS — R20.2 NUMBNESS AND TINGLING IN LEFT HAND: Primary | ICD-10-CM

## 2025-05-01 DIAGNOSIS — G83.84 TODD'S PARALYSIS (POSTEPILEPTIC) (HCC): ICD-10-CM

## 2025-05-01 PROCEDURE — 99214 OFFICE O/P EST MOD 30 MIN: CPT

## 2025-05-01 RX ORDER — GABAPENTIN 300 MG/1
300 CAPSULE ORAL
Qty: 30 CAPSULE | Refills: 6 | Status: SHIPPED | OUTPATIENT
Start: 2025-05-01

## 2025-05-01 RX ORDER — CARBAMAZEPINE 400 MG/1
400 TABLET, EXTENDED RELEASE ORAL 2 TIMES DAILY
Qty: 60 TABLET | Refills: 6 | Status: SHIPPED | OUTPATIENT
Start: 2025-05-01

## 2025-05-01 NOTE — TELEPHONE ENCOUNTER
Katelyn Holbrook-    Please see below messages, if you are willing to accept DYLAN, I will contact the patients Trupti gates and schedule in the [place] office if they are okay with that office.     Thank you,   Sara

## 2025-05-01 NOTE — TELEPHONE ENCOUNTER
Shemar Enrique and Dr. Babin,       Moving forward, this patient needs to stay in [place] due to secondary insurance.    Franko Lyles is aware and has requested DYLAN from Dr. Babin to [facility].     [facility],   Do you accept the DYLAN?        Trupti has the EEG print out so she can schedule that.       Patient had follow up today and per Dr. Babin he would like the patient to return in 4 months.

## 2025-05-01 NOTE — PROGRESS NOTES
Name: Jon Morton      : 1950      MRN: [id number]  Encounter Provider: Alva Babin MD  Encounter Date: 2025   Encounter department: [facility]  :  Assessment & Plan  Breakthrough seizure (HCC)    Orders:    carBAMazepine (TEGretol XR) 400 mg 12 hr tablet; Take 1 tablet (400 mg total) by mouth 2 (two) times a day    Seizure (HCC)    Orders:    carBAMazepine (TEGretol XR) 400 mg 12 hr tablet; Take 1 tablet (400 mg total) by mouth 2 (two) times a day    Numbness and tingling in left hand    Orders:    gabapentin (Neurontin) 300 mg capsule; Take 1 capsule (300 mg total) by mouth daily at bedtime    Patient  is a 74 year old right handed male with PMH of stroke and epilepsy who presents for follow up of seizures.      Denies any seizures since last visit. He is complaint with Carbamezapine 400mg BID and denies any side effects. Routine EEG is still pending. Carbamezapine level is normal at 8.6 on 2025.     Over the past few months, he developed left 1-3 fingers numbness and tingling. Denies neck pain or shooting pain down the arms. Denies left hand weakness.        He takes aspirin 81m and Lipitor 40mg for history of stroke.       Plan:  - Continue carbamezapine 400mg BID  - Start gabapentin 300mg QHS for hand tingling  - Ordered routine EEG  - Follow up in 4 months      History of Present Illness   HPI     Jon is a 74 year old right handed male with PMH of stroke and epilepsy who presents for follow up of seizures.      Denies any seizures since last visit. He is complaint with Carbamezapine 400mg BID and denies any side effects. Routine EEG is still pending. Carbamezapine level is normal at 8.6 on 2025.     Over the past few months, he developed left 1-3 fingers numbness and tingling. Denies neck pain or shooting pain down the arms. Denies left hand weakness.     During his usual seizures, he stares off. He is unable to speak or understand spoken  language. No shaking, denies tongue biting or urinary incontinence. He was admitted on 11/2/2024 for altered mental status, thought to be due to break through seizures as a result of carbamazepine non-compliance.     Note from 12/30/2024:  Jon Morton is a 74 year old right handed male with PMH of stroke and seizures who presents for evaluation of seizures. He is here with his niece.      He started having seizures at age 59. During his usual seizures, he stares off. He is unable to speak or understand spoken language. No shaking, denies tongue biting or urinary incontinence. His seizures last less than one minute.     He was admitted on 11/2/2024 for altered mental status. CTH and MRI Brain were negative for acute intracranial changes, but showed chronic lacunar infarcts. His presentation was thought to be due to breakthrough seizure as a result of carbamezapine non-compliance. His carbamezapine dosage was increased to 400mg BID.      Today, he denies any seizures since his discharge from his hospital. He is able to tolerate the increased dosage of carbamazepine and denies any side effects.      Used to see Dr. Michelle in [facility] - per his note, he used to have seizures of starring spells and lip/hand movements. Per chart check, seizures started October 2010 - thought to be of temporal lobe onset.     He takes aspirin 81m and Lipitor 40mg for history of stroke. He takes Namenda for dementia. He used to take keppra for seizures in the past, but it was not effective in controlling seizures.      Event/Seizure semiology:  Event type 1: Behavioral arrest, starring off  - Frequency/Date of last event: once a year, 11/2/2024  - Warning/Aura: no  - Loss of awareness: yes   - Amnestic to the event: yes   - Semiology: starring off  - Presence of post-ictal period: yes - confusion  - Sonorus breathing: no  - Incontinence: no  - Tongue biting: no     Special Features  Age/Date of seizure onset: 59  Status epilepticus:  "no  Self Injury Seizures: no  Precipitating Factors:  none  Longest seizure free interval: 2 years     Epilepsy Risk Factors:  Stroke     Current AEDs:  Carbamezapine   Medication side effects: None  Medication adherence: Yes     Prior AEDs:  Levetiracetam     Prior Evaluation:  - routine EEG: no  - cEEG/EMU: no  - MRI brain: yes   - PET: no  - fMRI: no  - Ictal SPECT: no  - Neuropsych evaluation: no  - Other:      History Reviewed:   The following were reviewed and updated as appropriate: allergies, current medications, past family history, past medical history, past social history, past surgical history, and problem list     Psychiatric History:  None     Social History:   Driving: No  Lives Alone: Yes  Occupation: retired  Review of Systems   Constitutional:  Negative for appetite change, fatigue and fever.   HENT: Negative.  Negative for hearing loss, tinnitus, trouble swallowing and voice change.    Eyes: Negative.  Negative for photophobia, pain and visual disturbance.   Respiratory: Negative.  Negative for shortness of breath.    Cardiovascular: Negative.  Negative for palpitations.   Gastrointestinal: Negative.  Negative for nausea and vomiting.   Endocrine: Negative.  Negative for cold intolerance.   Genitourinary: Negative.  Negative for dysuria, frequency and urgency.   Musculoskeletal:  Negative for back pain, gait problem, myalgias, neck pain and neck stiffness.   Skin: Negative.  Negative for rash.   Allergic/Immunologic: Negative.    Neurological:  Negative for dizziness, tremors, seizures, syncope, facial asymmetry, speech difficulty, weakness, light-headedness, numbness and headaches.   Hematological: Negative.  Does not bruise/bleed easily.   Psychiatric/Behavioral: Negative.  Negative for confusion, hallucinations and sleep disturbance.     I have personally reviewed the MA's review of systems and made changes as necessary.       Objective   Ht 6' 5\" (1.956 m)   Wt 88.9 kg (196 lb)   BMI 23.24 " "kg/m²     Physical Exam  Neurological Exam    /86 (BP Location: Left arm, Patient Position: Sitting, Cuff Size: Standard)   Pulse 77   Ht 6' 5\" (1.956 m)   Wt 88.9 kg (196 lb)   SpO2 97%   BMI 23.24 kg/m²      General Exam  General: well developed, no acute distress.  HEENT: mucous membranes moist, anicteric sclera.   Neck: supple, good ROM.      Neurological Exam  Mental Status: awake, alert, and fully oriented to person, place, time, and situation. Attention and memory intact. Fund of knowledge is appropriate for age and education.  There is no neglect.    Language: fluency, and comprehension normal.       Cranial Nerves: Pupils equal and reactive to light.  Visual fields full to confrontation. Extraocular motions intact with full versions, normal pursuits and saccades. Facial strength full and symmetric. Facial sensation intact in V1-V3. Hearing intact to voice. Tongue protrudes to midline. Palate elevates symmetrically. Speech clear without notable dysarthria. Shoulder shrug activation full and symmetric.    Motor: Normal bulk and tone. No pronator drift. Strength is 5/5 proximally and distally in all 4 extremities. No involuntary movements.    Sensory: Decreased sensation to light touch in left 1-3 digits.     Coordination: Normal finger-to-nose. Normal rapid alternating movements.     Station and gait: Casual and tandem gait normal. Normal Romberg.    Reflexes: Reflexes 1+ throughout and symmetric.       Administrative Statements   I have spent a total time of 30 minutes in caring for this patient on the day of the visit/encounter including Diagnostic results, Prognosis, Risks and benefits of tx options, Instructions for management, Patient and family education, Risk factor reductions, Counseling / Coordination of care, and Documenting in the medical record.  "

## 2025-05-01 NOTE — TELEPHONE ENCOUNTER
Call made to [facility], advised we have a epileptologist in our [place] location if they are willing to travel there, [facility]  has accepted a 4 month follow up with Dr. Holbrook on 9/25/25 at 2 pm for the patient.

## 2025-05-01 NOTE — TELEPHONE ENCOUNTER
I can see him, would need a 60 min appointment with me. However, [facility] would be closer to patient's residence. ([place] is over an hour away). Dr. Holbrook could also see him at [place] for his epilepsy.

## 2025-05-13 DIAGNOSIS — N39.0 UTI (URINARY TRACT INFECTION): ICD-10-CM

## 2025-05-14 RX ORDER — LANOLIN ALCOHOL/MO/W.PET/CERES
1000 CREAM (GRAM) TOPICAL DAILY
Qty: 90 TABLET | Refills: 0 | OUTPATIENT
Start: 2025-05-14

## 2025-05-16 DIAGNOSIS — N39.0 UTI (URINARY TRACT INFECTION): ICD-10-CM

## 2025-05-16 DIAGNOSIS — I10 PRIMARY HYPERTENSION: ICD-10-CM

## 2025-05-16 DIAGNOSIS — Z86.73 CHRONIC CEREBROVASCULAR ACCIDENT (CVA): ICD-10-CM

## 2025-05-19 RX ORDER — LANOLIN ALCOHOL/MO/W.PET/CERES
1000 CREAM (GRAM) TOPICAL DAILY
Qty: 90 TABLET | Refills: 0 | OUTPATIENT
Start: 2025-05-19

## 2025-05-22 ENCOUNTER — PROCEDURE VISIT (OUTPATIENT)
Dept: UROLOGY | Facility: AMBULATORY SURGERY CENTER | Age: 75
End: 2025-05-22
Payer: MEDICARE

## 2025-05-22 VITALS
HEIGHT: 77 IN | DIASTOLIC BLOOD PRESSURE: 84 MMHG | SYSTOLIC BLOOD PRESSURE: 150 MMHG | HEART RATE: 75 BPM | OXYGEN SATURATION: 97 % | WEIGHT: 197.8 LBS | BODY MASS INDEX: 23.35 KG/M2

## 2025-05-22 DIAGNOSIS — C67.2 MALIGNANT NEOPLASM OF LATERAL WALL OF URINARY BLADDER (HCC): Primary | ICD-10-CM

## 2025-05-22 PROCEDURE — 52000 CYSTOURETHROSCOPY: CPT | Performed by: UROLOGY

## 2025-05-22 PROCEDURE — 99214 OFFICE O/P EST MOD 30 MIN: CPT | Performed by: UROLOGY

## 2025-05-22 NOTE — ASSESSMENT & PLAN NOTE
Patient with high-grade appearing bladder cancer along the right lateral wall of the bladder.  While I am hopeful based on visualization that it is not muscle-invasive we cannot know this without resection.      He is unfortunately very high risk for any surgery because of his severe aortic stenosis.  I had a long discussion with the patient's daughter who is his power of  and the patient today about options.  I recommend he move forward with aortic valve replacement and that we address his bladder tumor soon after.  This may require treatment of tumor while still on antiplatelet medication but I think the associated risk of bleeding and hematuria is acceptable when compared to risks of a high grade appearing tumor potentially growing/spreading.      We will see him back in 8 weeks by which time I am hopeful he will have had or be scheduled for valve replacement.  In part influenced by duration for antiplatelet medication to follow we will arrange TURBT surgery

## 2025-05-22 NOTE — PROGRESS NOTES
Assessment/Plan:    Malignant neoplasm of lateral wall of urinary bladder (HCC)  Patient with high-grade appearing bladder cancer along the right lateral wall of the bladder.  While I am hopeful based on visualization that it is not muscle-invasive we cannot know this without resection.      He is unfortunately very high risk for any surgery because of his severe aortic stenosis.  I had a long discussion with the patient's daughter who is his power of  and the patient today about options.  I recommend he move forward with aortic valve replacement and that we address his bladder tumor soon after.  This may require treatment of tumor while still on antiplatelet medication but I think the associated risk of bleeding and hematuria is acceptable when compared to risks of a high grade appearing tumor potentially growing/spreading.      We will see him back in 8 weeks by which time I am hopeful he will have had or be scheduled for valve replacement.  In part influenced by duration for antiplatelet medication to follow we will arrange TURBT surgery          Subjective:      Patient ID: Jon Morton is a 74 y.o. male.    HPI    74-year-old male with history of bladder cancer with concern for recurrence.     The patient is accompanied by his daughter as he has history of dementia and she is his medical power of .  He has a history of prior TURBT for bladder cancer in 2008.  Unknown what his pathology was at the time.     ln February 14th 2025 the patient presented to hospital with hematuria and hallucinations in and hallucinations resolved with antibiotics although urine culture returned negative.  CT scan during admission was concerning for a 1.4 cm bladder mass along the right bladder wall          Denies any history of gross hematuria or voiding issues.  Is not on blood thinners.    We discussed options and the patient's daughter initially elected for going to the operating for TURBT rather than  "diagnostic cystoscopy.  However after meeting cardiology deemed very high risk for Cerni surgery given his severe aortic stenosis and therefore surgery was canceled.     Now comes in for cystoscopy.  He overall is doing well.  No significant hematuria.    Today showed a high-grade appearing 2cm tumor along the right lower lateral wall of the bladder.    Past Surgical History[1]     Past Medical History[2]          Review of Systems   Constitutional:  Negative for chills and fever.   HENT:  Negative for ear pain and sore throat.    Eyes:  Negative for pain and visual disturbance.   Respiratory:  Negative for cough and shortness of breath.    Cardiovascular:  Negative for chest pain and palpitations.   Gastrointestinal:  Negative for abdominal pain and vomiting.   Genitourinary:  Negative for dysuria and hematuria.   Musculoskeletal:  Negative for arthralgias and back pain.   Skin:  Negative for color change and rash.   Neurological:  Negative for seizures and syncope.   All other systems reviewed and are negative.        Objective:      /84 (BP Location: Left arm, Patient Position: Sitting, Cuff Size: Standard)   Pulse 75   Ht 6' 5\" (1.956 m)   Wt 89.7 kg (197 lb 12.8 oz)   SpO2 97%   BMI 23.46 kg/m²     Lab Results   Component Value Date    PSA 1.4 04/24/2015          Physical Exam  Vitals reviewed.   Constitutional:       Appearance: Normal appearance. He is normal weight.   HENT:      Head: Normocephalic and atraumatic.     Eyes:      Pupils: Pupils are equal, round, and reactive to light.     Abdominal:      General: Abdomen is flat.     Neurological:      General: No focal deficit present.      Mental Status: He is alert and oriented to person, place, and time.     Psychiatric:         Mood and Affect: Mood normal.         Thought Content: Thought content normal.            Cystoscopy     Date/Time  5/22/2025 8:00 AM     Performed by  Gerald Cardenas MD   Authorized by  Gerald Cardenas MD   " "    Universal Protocol:  procedure performed by consultantConsent: Written consent obtained  Risks and benefits: risks, benefits and alternatives were discussed  Consent given by: patient  Time out: Immediately prior to procedure a \"time out\" was called to verify the correct patient, procedure, equipment, support staff and site/side marked as required.  Patient understanding: patient states understanding of the procedure being performed  Patient consent: the patient's understanding of the procedure matches consent given  Procedure consent: procedure consent matches procedure scheduled  Patient identity confirmed: verbally with patient      Procedure Details:  Procedure type: cystoscopy    Patient tolerance: Patient tolerated the procedure well with no immediate complications    Additional Procedure Details: A time-out was performed identifying the correct patient site and procedure.  A MA chaperone was in the room.  A flexible cystoscope was introduced into the urethra.  The pendulous urethra was normal.  The prostatic urethra showed bilateral lobar hypertrophy without a median lobe.  The bladder had a 2 cm high-grade appearing lesion along the right lateral wall.  No other lesions were seen.  There were mild trabeculations and no diverticula.  The ureteral orifices were in orthotopic position.      Orders  Orders Placed This Encounter   Procedures    POCT urine dip          [1]   Past Surgical History:  Procedure Laterality Date    BACK SURGERY      CARDIAC CATHETERIZATION N/A 4/29/2025    Procedure: Cardiac RHC/LHC;  Surgeon: Benton Finch MD;  Location: [facility];  Service: Cardiology    DENTAL SURGERY     [2]   Past Medical History:  Diagnosis Date    Aortic stenosis     Bladder cancer (HCC)     Dementia (HCC)     Hx of bladder cancer     Hypercholesterolemia     Hyperlipidemia     Hypertension     Memory difficulties     Neuropathy     Seizures (HCC)     Stroke (HCC)      "

## 2025-05-22 NOTE — Clinical Note
I asked the patient's daughter to try to move forward quickly with aortic valve replacement surgery so we can then address his high-grade appearing bladder tumor to follow soon after.  Will likely do bladder tumor resection surgery while he is still on antiplatelet medication as the risks of bleeding are reasonable versus his high-grade appearing tumor.  If there is anything you can do to facilitate I would appreciate it.

## 2025-05-29 ENCOUNTER — TELEPHONE (OUTPATIENT)
Dept: CARDIAC SURGERY | Facility: CLINIC | Age: 75
End: 2025-05-29

## 2025-05-30 ENCOUNTER — TELEPHONE (OUTPATIENT)
Dept: CARDIAC SURGERY | Facility: CLINIC | Age: 75
End: 2025-05-30

## 2025-06-13 ENCOUNTER — TELEPHONE (OUTPATIENT)
Dept: CARDIAC SURGERY | Facility: CLINIC | Age: 75
End: 2025-06-13

## 2025-06-13 ENCOUNTER — TELEPHONE (OUTPATIENT)
Dept: UROLOGY | Facility: CLINIC | Age: 75
End: 2025-06-13

## 2025-06-13 NOTE — TELEPHONE ENCOUNTER
I called and spoke to his daughter and she said he is having heart surgery and can not take this apt.  I will be reaching out to her on Tuesday at 10:00 to find out how long the recovery will take so we have more info to try and find the next apt available.    I will send another note to you when I have that info.

## 2025-06-13 NOTE — TELEPHONE ENCOUNTER
----- Message from Christelle LEONARDO sent at 6/12/2025 10:49 AM EDT -----  Holding 7/22 at [facility] for TURBT for this patient  ----- Message -----  From: Gerald Cardenas MD  Sent: 6/10/2025  11:19 AM EDT  To: Christelle Anthony MA; Meghan Shearer RN    Yes that's fine, if christelle schedules a surgery before this (end of July) then we'll push it upAmanda- please let us know what date you are looking at for this patient's surgery aiming for end of July or first half of August  ----- Message -----  From: Meghan Shearer RN  Sent: 6/10/2025   9:52 AM EDT  To: Gerald Cardenas MD    He is scheduled with [facility] on 8/4. Is this ok to keep?  ----- Message -----  From: Gerald Cardenas MD  Sent: 6/5/2025   8:51 AM EDT  To: Belén Dhillon RN; Paula Robledo; Aris Rincon#    Ok, thanks much. Meghan - please plan for clinic fu at end of JulyAmanda- we'll book our TRUBT surgery based on his TAVR goes and his anticoagulation needs after (I think risks of bleeding would be worth doing surgery for aggressive appearing cancer while he is on antiplatelet medication).  ----- Message -----  From: Belén Dhillon RN  Sent: 6/5/2025   8:24 AM EDT  To: Paula Robledo; Aris Faulkner DO; Gerald GODINEZ Si#    Planning TAVR for 7/8/25. Will update you should this change.  ----- Message -----  From: Aris Faulkner DO  Sent: 5/28/2025  12:46 PM EDT  To: Belén Dhillon RN      ----- Message -----  From: Gerald Cardenas MD  Sent: 5/28/2025  10:56 AM EDT  To: Izabella Zavlaa MD; Aris Faulkner DO    Thanks Dr. Sally- I couldn't tell who his surgeon was so messaged you. Appreciate you putting us in touchDr. Lucie- Can your team try to expidte this patient's aortic valve replacement?  The reason being he has an aggressive appearing bladder cancer but is high risk for surgery to address that until his aortic stenosis is addressed.Best,Gerald Cardenas  ----- Message -----  From: Izabella Zavala MD  Sent: 5/22/2025   1:06 PM EDT  To: Aris Faulkner DO; MD Katelyn Klein Dr.  Ronald,Will loop in the patient's CT surgeon here as I do not have any role in planning or scheduling the TAVR.  ----- Message -----  From: Gerald Cardenas MD  Sent: 5/22/2025  12:08 PM EDT  To: Izabella Zavala MD    I asked the patient's daughter to try to move forward quickly with aortic valve replacement surgery so we can then address his high-grade appearing bladder tumor to follow soon after.  Will likely do bladder tumor resection surgery while he is still on antiplatelet medication as the risks of bleeding are reasonable versus his high-grade appearing tumor.  If there is anything you can do to facilitate I would appreciate it.

## 2025-06-16 ENCOUNTER — OFFICE VISIT (OUTPATIENT)
Dept: CARDIAC SURGERY | Facility: CLINIC | Age: 75
End: 2025-06-16
Payer: MEDICARE

## 2025-06-16 VITALS
SYSTOLIC BLOOD PRESSURE: 138 MMHG | HEART RATE: 72 BPM | OXYGEN SATURATION: 99 % | WEIGHT: 200.1 LBS | BODY MASS INDEX: 23.63 KG/M2 | DIASTOLIC BLOOD PRESSURE: 88 MMHG | HEIGHT: 77 IN

## 2025-06-16 DIAGNOSIS — I35.0 SEVERE AORTIC STENOSIS: Primary | ICD-10-CM

## 2025-06-16 PROCEDURE — 99213 OFFICE O/P EST LOW 20 MIN: CPT | Performed by: THORACIC SURGERY (CARDIOTHORACIC VASCULAR SURGERY)

## 2025-06-16 RX ORDER — MUPIROCIN 2 %
1 OINTMENT (GRAM) TOPICAL 2 TIMES DAILY
Qty: 22 G | Refills: 0 | Status: SHIPPED | OUTPATIENT
Start: 2025-06-16 | End: 2025-06-21

## 2025-06-16 RX ORDER — CHLORHEXIDINE GLUCONATE ORAL RINSE 1.2 MG/ML
15 SOLUTION DENTAL ONCE
OUTPATIENT
Start: 2025-06-16 | End: 2025-06-16

## 2025-06-16 RX ORDER — CEFAZOLIN SODIUM 2 G/50ML
2000 SOLUTION INTRAVENOUS ONCE
OUTPATIENT
Start: 2025-06-16 | End: 2025-06-16

## 2025-06-16 NOTE — H&P
History and Physical - Cardiac Surgery   Jon Morton 75 y.o. male MRN: [id number]      Reason for Consult / Principal Problem: Aortic stenosis, Non-Rheumatic    History of Present Illness: Jon Morton is a 75 y.o. year old male who was previously evaluated in our office by Aris Faulkner D.O. for transcatheter aortic valve replacement.  During this initial consultation, arrangements were made for the following studies to be completed: gated CTA of the chest, abdomen, and pelvis and cardiac catheterization.     To summarize his clinical course, Jon Morton was diagnosed with severe AS when presenting in November 2024 with stroke/seizure. His medical history is significant for bladder cancer with suspicion of recent recurrence, dementia, HTN, hypercholesterolemia, H/O seizures and strokes.     Jon Morton now presents to review the results of these tests and confirm the suitability of proceeding with transcatheter aortic valve replacment.    During interview today, he relates a history of exertional dyspnea and presyncope. He denies history of angina.     Of note, he has been evaluated by urology regarding the potential recurrence of his known bladder CA. They have recommended proceeding with TAVR and addressing the urologic issues in the future.     He denies alcohol, tobacco or drug use.     Past Medical History:  Past Medical History:   Diagnosis Date    Aortic stenosis     Bladder cancer (HCC)     Dementia (HCC)     Hx of bladder cancer     Hypercholesterolemia     Hyperlipidemia     Hypertension     Memory difficulties     Neuropathy     Seizures (HCC)     Stroke (HCC)          Past Surgical History:   [Past Surgical History]    [Past Surgical History]  Procedure Laterality Date    BACK SURGERY      CARDIAC CATHETERIZATION N/A 4/29/2025    Procedure: Cardiac RHC/LHC;  Surgeon: Benton Finch MD;  Location: [facility];  Service: Cardiology    DENTAL SURGERY           Family  History:  [Family History]    [Family History]  Problem Relation Name Age of Onset    Heart attack Mother      Heart disease Father      No Known Problems Sister      No Known Problems Brother      Post-traumatic stress disorder Niece Trupti     Anxiety disorder Niece Trupti     PKU Niece Trupti          Social History:    Social History     Substance and Sexual Activity   Alcohol Use Not Currently     Social History     Substance and Sexual Activity   Drug Use Never     [Tobacco Use History]    [Tobacco Use History]  Tobacco Use   Smoking Status Former    Current packs/day: 0.00    Types: Cigarettes    Quit date:     Years since quittin.5    Passive exposure: Past   Smokeless Tobacco Never       Home Medications:   Prior to Admission medications    Medication Sig Start Date End Date Taking? Authorizing Provider   aspirin 81 mg chewable tablet Chew 1 tablet (81 mg total) daily 24   Donnell Teague DO   atorvastatin (LIPITOR) 40 mg tablet TAKE 1 TABLET (40 MG TOTAL) BY MOUTH EVERY EVENING 24   Donnell Teague DO   carBAMazepine (TEGretol XR) 400 mg 12 hr tablet Take 1 tablet (400 mg total) by mouth 2 (two) times a day 25   Alva Babin MD   cyanocobalamin (VITAMIN B-12) 1000 MCG tablet Take 1 tablet (1,000 mcg total) by mouth daily 25   Harpal Cheung DO   donepezil (ARICEPT) 10 mg tablet Take 10 mg by mouth daily at bedtime 1 tab Daily 10/7/24   Historical Provider, MD   gabapentin (Neurontin) 300 mg capsule Take 1 capsule (300 mg total) by mouth daily at bedtime  Patient taking differently: Take 300 mg by mouth daily at bedtime Once at night 25   Alva Babin MD   losartan (COZAAR) 100 MG tablet Take 100 mg by mouth in the morning.    Historical Provider, MD   memantine (NAMENDA) 10 mg tablet Take 1 tablet by mouth in the morning and 1 tablet before bedtime. 3/30/25   Historical Provider, MD   metoprolol succinate (TOPROL-XL) 50 mg 24 hr tablet TAKE 1 TABLET (50 MG  "TOTAL) BY MOUTH 2 (TWO) TIMES A DAY 12/5/24   Donnell Teague DO       Allergies:  [Allergies]    [Allergies]  No Known Allergies    Review of Systems:     Review of Systems   Constitutional:  Positive for fatigue.   HENT: Negative.     Eyes: Negative.    Respiratory:  Positive for shortness of breath. Negative for chest tightness.    Cardiovascular:  Positive for chest pain. Negative for leg swelling.   Endocrine: Negative.    Genitourinary: Negative.    Musculoskeletal: Negative.    Skin: Negative.    Neurological:  Positive for weakness and light-headedness.   Psychiatric/Behavioral: Negative.         Vital Signs:     Vitals:    06/16/25 1255   BP: 138/88   BP Location: Left arm   Patient Position: Sitting   Cuff Size: Standard   Pulse: 72   SpO2: 99%   Weight: 90.8 kg (200 lb 1.6 oz)   Height: 6' 5\" (1.956 m)       Physical Exam:     Physical Exam  Constitutional:       Appearance: Normal appearance. He is well-developed.   HENT:      Head: Normocephalic and atraumatic.     Eyes:      Conjunctiva/sclera: Conjunctivae normal.     Neck:      Thyroid: No thyromegaly.      Vascular: No carotid bruit or JVD.      Trachea: No tracheal deviation.     Cardiovascular:      Rate and Rhythm: Normal rate and regular rhythm.      Pulses:           Carotid pulses are 2+ on the right side and 2+ on the left side.       Dorsalis pedis pulses are 2+ on the right side and 2+ on the left side.        Posterior tibial pulses are 2+ on the right side and 2+ on the left side.      Heart sounds: S1 normal and S2 normal. Murmur heard.   Pulmonary:      Effort: No accessory muscle usage or respiratory distress.      Breath sounds: No wheezing or rales.   Chest:      Chest wall: No tenderness.   Abdominal:      General: Bowel sounds are normal.      Palpations: Abdomen is soft.      Tenderness: There is no abdominal tenderness.     Musculoskeletal:         General: Normal range of motion.      Cervical back: Full passive range of " "motion without pain and normal range of motion.      Comments: Right upper arm with incision and sutures in place. There is surrounding ecchymosis.      Skin:     General: Skin is warm and dry.     Neurological:      Mental Status: He is alert and oriented to person, place, and time.      Cranial Nerves: No cranial nerve deficit.      Sensory: No sensory deficit.     Psychiatric:         Speech: Speech normal.         Behavior: Behavior normal.         Lab Results:               Invalid input(s): \"LABGLOM\"      Lab Results   Component Value Date    HGBA1C 5.3 11/03/2024     Lab Results   Component Value Date    TROPONINI 0.06 (H) 09/19/2020       Imaging Studies:     Echocardiogram:       Left Ventricle: Left ventricular cavity size is normal. Wall thickness is moderately increased. The left ventricular ejection fraction is 70%. Systolic function is hyperdynamic. Wall motion is normal.    Aortic Valve: The leaflets are severely calcified. The leaflets exhibit normal mobility. There is severe stenosis. The aortic valve peak velocity is 5.62 m/s. The aortic valve mean gradient is 85 mmHg. The dimensionless velocity index is 0.18. The aortic valve area is 0.42 cm2.    Mitral Valve: There is severe annular calcification. There is mild regurgitation. There is mild stenosis. The mitral valve mean gradient is 4mmHg.    Gated CTA of the chest/abdomen/pelvis:     Measurements and analysis to allow for planning of Transcatheter Aortic Valve Repair as above.     Innumerable pulmonary micronodules, scattered pulmonary granulomata, and calcified mediastinal/hilar nodes as well as pericardial calcifications. Findings are most consistent with with sequela of pulmonary granulomatous disease, possibly prior histoplasmosis.     Small bilateral pleural effusions.     Enhancing polypoid bladder mass highly suspicious for transitional cell neoplasm. No retroperitoneal or pelvic lymphadenopathy.    Cardiac catheterization: No obstructive " epicardial CAD.     Carotid artery ultrasound:     RIGHT:  There is <50% stenosis noted in the internal carotid artery. Plaque is  heterogenous and irregular.  Vertebral artery flow is antegrade. There is no significant subclavian artery  disease.     LEFT:  There is 50-69% stenosis noted in the internal carotid artery. Plaque is  heterogenous and irregular.  Vertebral artery flow is antegrade. There is no significant subclavian artery  disease.    Results Review Statement: I reviewed radiology reports from this admission including: CT chest, CT abdomen/pelvis, procedure reports, Ultrasound(s), and Echocardiogram.    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 Based on their STS risk assessment, they have undergone testing for transcatheter aortic valve replacement.  The results of these studies have been interpreted by the multidisciplinary TAVR team who have determined the patient to be moderate risk for open aortic valve replacement based on other pre-existing comobidities not reflected in the STS risk score.      The risks, benefits, and  "alternatives to TAVR were discussed in detail with the patient today. They understand and wish to proceed with transfemoral transcatheter aortic valve replacement.  Informed consent was obtained and a date for the intervention has been set.    Jon Morton was comfortable with our recommendations, and their questions were answered to their satisfaction.      Shared decision-making encounter occurred during this visit. Additionally, heart team evaluation of suitability for surgical replacement has been completed.     The following preoperative instructions were provided at the conclusion of their consultation:     1. You will receive a phone call from the hospital between 2:00 PM and 8:00 PM the day prior to surgery to confirm arrival time and location. For surgery on Mondays, you will receive a call on Friday  2. Do not drink or eat anything after midnight the night before surgery. That includes no water, candy, gum, lozenges, Lifesavers, etc. We recommend you not eat any \"junk\" food, consume alcohol or smoke the night before surgery.  3. Continue taking aspirin but only 81 mg daily.  4. If you take Coumadin discontinue it 5 days before surgery.  5. If you are diabetic, do not take any of your diabetic pills the morning of surgery. If you take Lantus insulin, you may take it at your regularly scheduled time the day before surgery. Do not take any other insulins the morning of surgery.  6. The 2 nights before surgery, take a shower each night using the special antiseptic soap or soap sponges you received from the office or hospital. Shampoo your hair with regular shampoo and rinse completely before using the antiseptic sponges. Use the sponge to wash from your neck down, with special attention to the armpits and groin area. Do not use any other soap or cleanser on your skin. Do not use lotions, powder, deodorant or perfume of any kind on your skin after you shower. Use clean bed linens and wear clean pajamas " "after your shower.  7. You will be prescribed Mupirocin nasal ointment. Apply to both nostrils twice a day for 5 days prior to surgery.  8. Do not take a shower the morning of her surgery; you'll be given a special\" bath\" at the hospital.  9. Notify the CT surgery office if you develop a cold, sore throat, cough, fever or other health issues before your surgery.  10. Other medication changes included the following: N/A     SIGNATURE: Eliseo Medel PA-C  DATE: June 16, 2025  TIME: 1:31 PM    * This note was completed in part utilizing [employer] direct voice recognition software.   Grammatical errors, random word insertion, spelling mistakes, and incomplete sentences may be an occasional consequence of the system secondary to software limitations, ambient noise and hardware issues. At the time of dictation, efforts were made to edit, clarify and /or correct errors. Please read the chart carefully and recognize, using context, where substitutions have occurred.  If you have any questions or concerns about the context, text or information contained within the body of this dictation, please contact myself, the provider, for further clarification.      "

## 2025-06-16 NOTE — H&P (VIEW-ONLY)
History and Physical - Cardiac Surgery   Jon Morton 75 y.o. male MRN: [id number]      Reason for Consult / Principal Problem: Aortic stenosis, Non-Rheumatic    History of Present Illness: Jon Morton is a 75 y.o. year old male who was previously evaluated in our office by Aris Faulkner D.O. for transcatheter aortic valve replacement.  During this initial consultation, arrangements were made for the following studies to be completed: gated CTA of the chest, abdomen, and pelvis and cardiac catheterization.     To summarize his clinical course, Jon Morton was diagnosed with severe AS when presenting in November 2024 with stroke/seizure. His medical history is significant for bladder cancer with suspicion of recent recurrence, dementia, HTN, hypercholesterolemia, H/O seizures and strokes.     Jon Morton now presents to review the results of these tests and confirm the suitability of proceeding with transcatheter aortic valve replacment.    During interview today, he relates a history of exertional dyspnea and presyncope. He denies history of angina.     Of note, he has been evaluated by urology regarding the potential recurrence of his known bladder CA. They have recommended proceeding with TAVR and addressing the urologic issues in the future.     He denies alcohol, tobacco or drug use.     Past Medical History:  Past Medical History:   Diagnosis Date    Aortic stenosis     Bladder cancer (HCC)     Dementia (HCC)     Hx of bladder cancer     Hypercholesterolemia     Hyperlipidemia     Hypertension     Memory difficulties     Neuropathy     Seizures (HCC)     Stroke (HCC)          Past Surgical History:   [Past Surgical History]    [Past Surgical History]  Procedure Laterality Date    BACK SURGERY      CARDIAC CATHETERIZATION N/A 4/29/2025    Procedure: Cardiac RHC/LHC;  Surgeon: Benton Finch MD;  Location: [facility];  Service: Cardiology    DENTAL SURGERY           Family  History:  [Family History]    [Family History]  Problem Relation Name Age of Onset    Heart attack Mother      Heart disease Father      No Known Problems Sister      No Known Problems Brother      Post-traumatic stress disorder Niece Trupti     Anxiety disorder Niece Trupti     PKU Niece Trupti          Social History:    Social History     Substance and Sexual Activity   Alcohol Use Not Currently     Social History     Substance and Sexual Activity   Drug Use Never     [Tobacco Use History]    [Tobacco Use History]  Tobacco Use   Smoking Status Former    Current packs/day: 0.00    Types: Cigarettes    Quit date:     Years since quittin.5    Passive exposure: Past   Smokeless Tobacco Never       Home Medications:   Prior to Admission medications    Medication Sig Start Date End Date Taking? Authorizing Provider   aspirin 81 mg chewable tablet Chew 1 tablet (81 mg total) daily 24   Donnell Teague DO   atorvastatin (LIPITOR) 40 mg tablet TAKE 1 TABLET (40 MG TOTAL) BY MOUTH EVERY EVENING 24   Donnell Teague DO   carBAMazepine (TEGretol XR) 400 mg 12 hr tablet Take 1 tablet (400 mg total) by mouth 2 (two) times a day 25   Alva Babin MD   cyanocobalamin (VITAMIN B-12) 1000 MCG tablet Take 1 tablet (1,000 mcg total) by mouth daily 25   Harpal Cheung DO   donepezil (ARICEPT) 10 mg tablet Take 10 mg by mouth daily at bedtime 1 tab Daily 10/7/24   Historical Provider, MD   gabapentin (Neurontin) 300 mg capsule Take 1 capsule (300 mg total) by mouth daily at bedtime  Patient taking differently: Take 300 mg by mouth daily at bedtime Once at night 25   Alva Babin MD   losartan (COZAAR) 100 MG tablet Take 100 mg by mouth in the morning.    Historical Provider, MD   memantine (NAMENDA) 10 mg tablet Take 1 tablet by mouth in the morning and 1 tablet before bedtime. 3/30/25   Historical Provider, MD   metoprolol succinate (TOPROL-XL) 50 mg 24 hr tablet TAKE 1 TABLET (50 MG  "TOTAL) BY MOUTH 2 (TWO) TIMES A DAY 12/5/24   Donnell Teague DO       Allergies:  [Allergies]    [Allergies]  No Known Allergies    Review of Systems:     Review of Systems   Constitutional:  Positive for fatigue.   HENT: Negative.     Eyes: Negative.    Respiratory:  Positive for shortness of breath. Negative for chest tightness.    Cardiovascular:  Positive for chest pain. Negative for leg swelling.   Endocrine: Negative.    Genitourinary: Negative.    Musculoskeletal: Negative.    Skin: Negative.    Neurological:  Positive for weakness and light-headedness.   Psychiatric/Behavioral: Negative.         Vital Signs:     Vitals:    06/16/25 1255   BP: 138/88   BP Location: Left arm   Patient Position: Sitting   Cuff Size: Standard   Pulse: 72   SpO2: 99%   Weight: 90.8 kg (200 lb 1.6 oz)   Height: 6' 5\" (1.956 m)       Physical Exam:     Physical Exam  Constitutional:       Appearance: Normal appearance. He is well-developed.   HENT:      Head: Normocephalic and atraumatic.     Eyes:      Conjunctiva/sclera: Conjunctivae normal.     Neck:      Thyroid: No thyromegaly.      Vascular: No carotid bruit or JVD.      Trachea: No tracheal deviation.     Cardiovascular:      Rate and Rhythm: Normal rate and regular rhythm.      Pulses:           Carotid pulses are 2+ on the right side and 2+ on the left side.       Dorsalis pedis pulses are 2+ on the right side and 2+ on the left side.        Posterior tibial pulses are 2+ on the right side and 2+ on the left side.      Heart sounds: S1 normal and S2 normal. Murmur heard.   Pulmonary:      Effort: No accessory muscle usage or respiratory distress.      Breath sounds: No wheezing or rales.   Chest:      Chest wall: No tenderness.   Abdominal:      General: Bowel sounds are normal.      Palpations: Abdomen is soft.      Tenderness: There is no abdominal tenderness.     Musculoskeletal:         General: Normal range of motion.      Cervical back: Full passive range of " "motion without pain and normal range of motion.      Comments: Right upper arm with incision and sutures in place. There is surrounding ecchymosis.      Skin:     General: Skin is warm and dry.     Neurological:      Mental Status: He is alert and oriented to person, place, and time.      Cranial Nerves: No cranial nerve deficit.      Sensory: No sensory deficit.     Psychiatric:         Speech: Speech normal.         Behavior: Behavior normal.         Lab Results:               Invalid input(s): \"LABGLOM\"      Lab Results   Component Value Date    HGBA1C 5.3 11/03/2024     Lab Results   Component Value Date    TROPONINI 0.06 (H) 09/19/2020       Imaging Studies:     Echocardiogram:       Left Ventricle: Left ventricular cavity size is normal. Wall thickness is moderately increased. The left ventricular ejection fraction is 70%. Systolic function is hyperdynamic. Wall motion is normal.    Aortic Valve: The leaflets are severely calcified. The leaflets exhibit normal mobility. There is severe stenosis. The aortic valve peak velocity is 5.62 m/s. The aortic valve mean gradient is 85 mmHg. The dimensionless velocity index is 0.18. The aortic valve area is 0.42 cm2.    Mitral Valve: There is severe annular calcification. There is mild regurgitation. There is mild stenosis. The mitral valve mean gradient is 4mmHg.    Gated CTA of the chest/abdomen/pelvis:     Measurements and analysis to allow for planning of Transcatheter Aortic Valve Repair as above.     Innumerable pulmonary micronodules, scattered pulmonary granulomata, and calcified mediastinal/hilar nodes as well as pericardial calcifications. Findings are most consistent with with sequela of pulmonary granulomatous disease, possibly prior histoplasmosis.     Small bilateral pleural effusions.     Enhancing polypoid bladder mass highly suspicious for transitional cell neoplasm. No retroperitoneal or pelvic lymphadenopathy.    Cardiac catheterization: No obstructive " epicardial CAD.     Carotid artery ultrasound:     RIGHT:  There is <50% stenosis noted in the internal carotid artery. Plaque is  heterogenous and irregular.  Vertebral artery flow is antegrade. There is no significant subclavian artery  disease.     LEFT:  There is 50-69% stenosis noted in the internal carotid artery. Plaque is  heterogenous and irregular.  Vertebral artery flow is antegrade. There is no significant subclavian artery  disease.    Results Review Statement: I reviewed radiology reports from this admission including: CT chest, CT abdomen/pelvis, procedure reports, Ultrasound(s), and Echocardiogram.    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 Based on their STS risk assessment, they have undergone testing for transcatheter aortic valve replacement.  The results of these studies have been interpreted by the multidisciplinary TAVR team who have determined the patient to be moderate risk for open aortic valve replacement based on other pre-existing comobidities not reflected in the STS risk score.      The risks, benefits, and  "alternatives to TAVR were discussed in detail with the patient today. They understand and wish to proceed with transfemoral transcatheter aortic valve replacement.  Informed consent was obtained and a date for the intervention has been set.    Jon Morton was comfortable with our recommendations, and their questions were answered to their satisfaction.      Shared decision-making encounter occurred during this visit. Additionally, heart team evaluation of suitability for surgical replacement has been completed.     The following preoperative instructions were provided at the conclusion of their consultation:     1. You will receive a phone call from the hospital between 2:00 PM and 8:00 PM the day prior to surgery to confirm arrival time and location. For surgery on Mondays, you will receive a call on Friday  2. Do not drink or eat anything after midnight the night before surgery. That includes no water, candy, gum, lozenges, Lifesavers, etc. We recommend you not eat any \"junk\" food, consume alcohol or smoke the night before surgery.  3. Continue taking aspirin but only 81 mg daily.  4. If you take Coumadin discontinue it 5 days before surgery.  5. If you are diabetic, do not take any of your diabetic pills the morning of surgery. If you take Lantus insulin, you may take it at your regularly scheduled time the day before surgery. Do not take any other insulins the morning of surgery.  6. The 2 nights before surgery, take a shower each night using the special antiseptic soap or soap sponges you received from the office or hospital. Shampoo your hair with regular shampoo and rinse completely before using the antiseptic sponges. Use the sponge to wash from your neck down, with special attention to the armpits and groin area. Do not use any other soap or cleanser on your skin. Do not use lotions, powder, deodorant or perfume of any kind on your skin after you shower. Use clean bed linens and wear clean pajamas " "after your shower.  7. You will be prescribed Mupirocin nasal ointment. Apply to both nostrils twice a day for 5 days prior to surgery.  8. Do not take a shower the morning of her surgery; you'll be given a special\" bath\" at the hospital.  9. Notify the CT surgery office if you develop a cold, sore throat, cough, fever or other health issues before your surgery.  10. Other medication changes included the following: N/A     SIGNATURE: Eliseo Medel PA-C  DATE: June 16, 2025  TIME: 1:31 PM    * This note was completed in part utilizing [employer] direct voice recognition software.   Grammatical errors, random word insertion, spelling mistakes, and incomplete sentences may be an occasional consequence of the system secondary to software limitations, ambient noise and hardware issues. At the time of dictation, efforts were made to edit, clarify and /or correct errors. Please read the chart carefully and recognize, using context, where substitutions have occurred.  If you have any questions or concerns about the context, text or information contained within the body of this dictation, please contact myself, the provider, for further clarification.      "

## 2025-06-16 NOTE — PROGRESS NOTES
Consultation - Cardiac Surgery   Jon Morton 75 y.o. male MRN: [id number]      Reason for Consult / Principal Problem: Aortic stenosis, Non-Rheumatic    History of Present Illness: Jon Morton is a 75 y.o. year old male who was previously evaluated in our office by Aris Faulkner D.O. for transcatheter aortic valve replacement.  During this initial consultation, arrangements were made for the following studies to be completed: gated CTA of the chest, abdomen, and pelvis and cardiac catheterization.     To summarize his clinical course, Jon Morton was diagnosed with severe AS when presenting in November 2024 with stroke/seizure. His medical history is significant for bladder cancer with suspicion of recent recurrence, dementia, HTN, hypercholesterolemia, H/O seizures and strokes.     Jon Morton now presents to review the results of these tests and confirm the suitability of proceeding with transcatheter aortic valve replacment.    During interview today, he relates a history of exertional dyspnea and presyncope. He denies history of angina.     Of note, he has been evaluated by urology regarding the potential recurrence of his known bladder CA. They have recommended proceeding with TAVR and addressing the urologic issues in the future.     He denies alcohol, tobacco or drug use.     Past Medical History:  Past Medical History:   Diagnosis Date    Aortic stenosis     Bladder cancer (HCC)     Dementia (HCC)     Hx of bladder cancer     Hypercholesterolemia     Hyperlipidemia     Hypertension     Memory difficulties     Neuropathy     Seizures (HCC)     Stroke (HCC)          Past Surgical History:   Past Surgical History[1]      Family History:  Family History[2]      Social History:    Social History     Substance and Sexual Activity   Alcohol Use Not Currently     Social History     Substance and Sexual Activity   Drug Use Never     Tobacco Use History[3]    Home Medications:   Prior to  Admission medications    Medication Sig Start Date End Date Taking? Authorizing Provider   aspirin 81 mg chewable tablet Chew 1 tablet (81 mg total) daily 11/6/24   Donnell Teague DO   atorvastatin (LIPITOR) 40 mg tablet TAKE 1 TABLET (40 MG TOTAL) BY MOUTH EVERY EVENING 12/5/24   Donnell Teague DO   carBAMazepine (TEGretol XR) 400 mg 12 hr tablet Take 1 tablet (400 mg total) by mouth 2 (two) times a day 5/1/25   Alva Babin MD   cyanocobalamin (VITAMIN B-12) 1000 MCG tablet Take 1 tablet (1,000 mcg total) by mouth daily 2/17/25   Harpal Cheung DO   donepezil (ARICEPT) 10 mg tablet Take 10 mg by mouth daily at bedtime 1 tab Daily 10/7/24   Historical Provider, MD   gabapentin (Neurontin) 300 mg capsule Take 1 capsule (300 mg total) by mouth daily at bedtime  Patient taking differently: Take 300 mg by mouth daily at bedtime Once at night 5/1/25   Alva Babin MD   losartan (COZAAR) 100 MG tablet Take 100 mg by mouth in the morning.    Historical Provider, MD   memantine (NAMENDA) 10 mg tablet Take 1 tablet by mouth in the morning and 1 tablet before bedtime. 3/30/25   Historical Provider, MD   metoprolol succinate (TOPROL-XL) 50 mg 24 hr tablet TAKE 1 TABLET (50 MG TOTAL) BY MOUTH 2 (TWO) TIMES A DAY 12/5/24   Donnell Teague DO       Allergies:  Allergies[4]    Review of Systems:     Review of Systems   Constitutional:  Positive for fatigue.   HENT: Negative.     Eyes: Negative.    Respiratory:  Positive for shortness of breath. Negative for chest tightness.    Cardiovascular:  Positive for chest pain. Negative for leg swelling.   Endocrine: Negative.    Genitourinary: Negative.    Musculoskeletal: Negative.    Skin: Negative.    Neurological:  Positive for weakness and light-headedness.   Psychiatric/Behavioral: Negative.         Vital Signs:     Vitals:    06/16/25 1255   BP: 138/88   BP Location: Left arm   Patient Position: Sitting   Cuff Size: Standard   Pulse: 72   SpO2: 99%  "  Weight: 90.8 kg (200 lb 1.6 oz)   Height: 6' 5\" (1.956 m)       Physical Exam:     Physical Exam  Constitutional:       Appearance: Normal appearance. He is well-developed.   HENT:      Head: Normocephalic and atraumatic.     Eyes:      Conjunctiva/sclera: Conjunctivae normal.     Neck:      Thyroid: No thyromegaly.      Vascular: No carotid bruit or JVD.      Trachea: No tracheal deviation.     Cardiovascular:      Rate and Rhythm: Normal rate and regular rhythm.      Pulses:           Carotid pulses are 2+ on the right side and 2+ on the left side.       Dorsalis pedis pulses are 2+ on the right side and 2+ on the left side.        Posterior tibial pulses are 2+ on the right side and 2+ on the left side.      Heart sounds: S1 normal and S2 normal. Murmur heard.   Pulmonary:      Effort: No accessory muscle usage or respiratory distress.      Breath sounds: No wheezing or rales.   Chest:      Chest wall: No tenderness.   Abdominal:      General: Bowel sounds are normal.      Palpations: Abdomen is soft.      Tenderness: There is no abdominal tenderness.     Musculoskeletal:         General: Normal range of motion.      Cervical back: Full passive range of motion without pain and normal range of motion.      Comments: Right upper arm with incision and sutures in place. There is surrounding ecchymosis.      Skin:     General: Skin is warm and dry.     Neurological:      Mental Status: He is alert and oriented to person, place, and time.      Cranial Nerves: No cranial nerve deficit.      Sensory: No sensory deficit.     Psychiatric:         Speech: Speech normal.         Behavior: Behavior normal.         Lab Results:               Invalid input(s): \"LABGLOM\"      Lab Results   Component Value Date    HGBA1C 5.3 11/03/2024     Lab Results   Component Value Date    TROPONINI 0.06 (H) 09/19/2020       Imaging Studies:     Echocardiogram:       Left Ventricle: Left ventricular cavity size is normal. Wall thickness is " moderately increased. The left ventricular ejection fraction is 70%. Systolic function is hyperdynamic. Wall motion is normal.    Aortic Valve: The leaflets are severely calcified. The leaflets exhibit normal mobility. There is severe stenosis. The aortic valve peak velocity is 5.62 m/s. The aortic valve mean gradient is 85 mmHg. The dimensionless velocity index is 0.18. The aortic valve area is 0.42 cm2.    Mitral Valve: There is severe annular calcification. There is mild regurgitation. There is mild stenosis. The mitral valve mean gradient is 4mmHg.    Gated CTA of the chest/abdomen/pelvis:     Measurements and analysis to allow for planning of Transcatheter Aortic Valve Repair as above.     Innumerable pulmonary micronodules, scattered pulmonary granulomata, and calcified mediastinal/hilar nodes as well as pericardial calcifications. Findings are most consistent with with sequela of pulmonary granulomatous disease, possibly prior histoplasmosis.     Small bilateral pleural effusions.     Enhancing polypoid bladder mass highly suspicious for transitional cell neoplasm. No retroperitoneal or pelvic lymphadenopathy.    Cardiac catheterization: No obstructive epicardial CAD.     Carotid artery ultrasound:     RIGHT:  There is <50% stenosis noted in the internal carotid artery. Plaque is  heterogenous and irregular.  Vertebral artery flow is antegrade. There is no significant subclavian artery  disease.     LEFT:  There is 50-69% stenosis noted in the internal carotid artery. Plaque is  heterogenous and irregular.  Vertebral artery flow is antegrade. There is no significant subclavian artery  disease.    Results Review Statement: I reviewed radiology reports from this admission including: CT chest, CT abdomen/pelvis, procedure reports, Ultrasound(s), and Echocardiogram.    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 Based on their STS risk assessment, they have undergone testing for transcatheter aortic valve replacement.  The results of these studies have been interpreted by the multidisciplinary TAVR team who have determined the patient to be moderate risk for open aortic valve replacement based on other pre-existing comobidities not reflected in the STS risk score.      The risks, benefits, and alternatives to TAVR were discussed in detail with the patient today. They understand and wish to proceed with transfemoral transcatheter aortic valve replacement.  Informed consent was obtained and a date for the intervention has been set.    Jon Morton was comfortable with our recommendations, and their questions were answered to their satisfaction.      Shared decision-making encounter occurred during this visit. Additionally, heart team evaluation of suitability for surgical replacement has been completed.     The following preoperative instructions were provided at the conclusion of their consultation:     1. You will receive a phone call from the hospital between 2:00 PM and 8:00 PM the day prior to surgery to confirm arrival time and  "location. For surgery on Mondays, you will receive a call on Friday  2. Do not drink or eat anything after midnight the night before surgery. That includes no water, candy, gum, lozenges, Lifesavers, etc. We recommend you not eat any \"junk\" food, consume alcohol or smoke the night before surgery.  3. Continue taking aspirin but only 81 mg daily.  4. If you take Coumadin discontinue it 5 days before surgery.  5. If you are diabetic, do not take any of your diabetic pills the morning of surgery. If you take Lantus insulin, you may take it at your regularly scheduled time the day before surgery. Do not take any other insulins the morning of surgery.  6. The 2 nights before surgery, take a shower each night using the special antiseptic soap or soap sponges you received from the office or hospital. Shampoo your hair with regular shampoo and rinse completely before using the antiseptic sponges. Use the sponge to wash from your neck down, with special attention to the armpits and groin area. Do not use any other soap or cleanser on your skin. Do not use lotions, powder, deodorant or perfume of any kind on your skin after you shower. Use clean bed linens and wear clean pajamas after your shower.  7. You will be prescribed Mupirocin nasal ointment. Apply to both nostrils twice a day for 5 days prior to surgery.  8. Do not take a shower the morning of her surgery; you'll be given a special\" bath\" at the hospital.  9. Notify the CT surgery office if you develop a cold, sore throat, cough, fever or other health issues before your surgery.  10. Other medication changes included the following: N/A     SIGNATURE: Eliseo Medel PA-C  DATE: June 16, 2025  TIME: 1:31 PM    * This note was completed in part utilizing [employer] direct voice recognition software.   Grammatical errors, random word insertion, spelling mistakes, and incomplete sentences may be an occasional consequence of the system secondary to software " limitations, ambient noise and hardware issues. At the time of dictation, efforts were made to edit, clarify and /or correct errors. Please read the chart carefully and recognize, using context, where substitutions have occurred.  If you have any questions or concerns about the context, text or information contained within the body of this dictation, please contact myself, the provider, for further clarification.         [1]   Past Surgical History:  Procedure Laterality Date    BACK SURGERY      CARDIAC CATHETERIZATION N/A 2025    Procedure: Cardiac RHC/LHC;  Surgeon: Benton Finch MD;  Location: [facility];  Service: Cardiology    DENTAL SURGERY     [2]   Family History  Problem Relation Name Age of Onset    Heart attack Mother      Heart disease Father      No Known Problems Sister      No Known Problems Brother      Post-traumatic stress disorder Niece Trupti     Anxiety disorder Niece Trupti     PKU Niece Trupti    [3]   Social History  Tobacco Use   Smoking Status Former    Current packs/day: 0.00    Types: Cigarettes    Quit date:     Years since quittin.5    Passive exposure: Past   Smokeless Tobacco Never   [4] No Known Allergies

## 2025-06-17 ENCOUNTER — TELEPHONE (OUTPATIENT)
Dept: CARDIAC SURGERY | Facility: CLINIC | Age: 75
End: 2025-06-17

## 2025-06-17 NOTE — TELEPHONE ENCOUNTER
Trupti asked how pt can be put in for a request earliest surgery in the day. Please advise. Thank you

## 2025-06-17 NOTE — TELEPHONE ENCOUNTER
Called patient's niece Trupti to go over preoperative instructions and bloodwork needed for TAVR on 7/8/25. She understood and knows if she has questions to call our office.

## 2025-06-27 ENCOUNTER — APPOINTMENT (OUTPATIENT)
Dept: LAB | Age: 75
End: 2025-06-27
Attending: PHYSICIAN ASSISTANT
Payer: MEDICARE

## 2025-06-27 DIAGNOSIS — I35.0 SEVERE AORTIC STENOSIS: ICD-10-CM

## 2025-06-27 LAB
ABO GROUP BLD: NORMAL
ALBUMIN SERPL BCG-MCNC: 4.1 G/DL (ref 3.5–5)
ALP SERPL-CCNC: 144 U/L (ref 34–104)
ALT SERPL W P-5'-P-CCNC: 15 U/L (ref 7–52)
ANION GAP SERPL CALCULATED.3IONS-SCNC: 6 MMOL/L (ref 4–13)
AST SERPL W P-5'-P-CCNC: 24 U/L (ref 13–39)
BILIRUB SERPL-MCNC: 0.48 MG/DL (ref 0.2–1)
BLD GP AB SCN SERPL QL: POSITIVE
BLOOD GROUP ANTIBODIES SERPL: NORMAL
BUN SERPL-MCNC: 21 MG/DL (ref 5–25)
CALCIUM SERPL-MCNC: 9.2 MG/DL (ref 8.4–10.2)
CHLORIDE SERPL-SCNC: 100 MMOL/L (ref 96–108)
CO2 SERPL-SCNC: 30 MMOL/L (ref 21–32)
CREAT SERPL-MCNC: 0.99 MG/DL (ref 0.6–1.3)
E AG RBC QL: NEGATIVE
ERYTHROCYTE [DISTWIDTH] IN BLOOD BY AUTOMATED COUNT: 13.5 % (ref 11.6–15.1)
GFR SERPL CREATININE-BSD FRML MDRD: 73 ML/MIN/1.73SQ M
GLUCOSE P FAST SERPL-MCNC: 83 MG/DL (ref 65–99)
HCT VFR BLD AUTO: 42.1 % (ref 36.5–49.3)
HGB BLD-MCNC: 13.8 G/DL (ref 12–17)
INR PPP: 0.99 (ref 0.85–1.19)
MCH RBC QN AUTO: 31.4 PG (ref 26.8–34.3)
MCHC RBC AUTO-ENTMCNC: 32.8 G/DL (ref 31.4–37.4)
MCV RBC AUTO: 96 FL (ref 82–98)
PLATELET # BLD AUTO: 196 THOUSANDS/UL (ref 149–390)
PMV BLD AUTO: 9.7 FL (ref 8.9–12.7)
POTASSIUM SERPL-SCNC: 4.5 MMOL/L (ref 3.5–5.3)
PROT SERPL-MCNC: 7.2 G/DL (ref 6.4–8.4)
PROTHROMBIN TIME: 13.4 SECONDS (ref 12.3–15)
RBC # BLD AUTO: 4.4 MILLION/UL (ref 3.88–5.62)
RH BLD: POSITIVE
SODIUM SERPL-SCNC: 136 MMOL/L (ref 135–147)
SPECIMEN EXPIRATION DATE: NORMAL
WBC # BLD AUTO: 5.59 THOUSAND/UL (ref 4.31–10.16)

## 2025-06-27 PROCEDURE — 87081 CULTURE SCREEN ONLY: CPT

## 2025-06-27 PROCEDURE — 86900 BLOOD TYPING SEROLOGIC ABO: CPT

## 2025-06-27 PROCEDURE — 85027 COMPLETE CBC AUTOMATED: CPT

## 2025-06-27 PROCEDURE — 36415 COLL VENOUS BLD VENIPUNCTURE: CPT

## 2025-06-27 PROCEDURE — 80053 COMPREHEN METABOLIC PANEL: CPT

## 2025-06-27 PROCEDURE — 86905 BLOOD TYPING RBC ANTIGENS: CPT

## 2025-06-27 PROCEDURE — 85610 PROTHROMBIN TIME: CPT

## 2025-06-27 PROCEDURE — 86850 RBC ANTIBODY SCREEN: CPT

## 2025-06-27 PROCEDURE — 86870 RBC ANTIBODY IDENTIFICATION: CPT | Performed by: PHYSICIAN ASSISTANT

## 2025-06-27 PROCEDURE — 86901 BLOOD TYPING SEROLOGIC RH(D): CPT

## 2025-06-28 LAB — MRSA NOSE QL CULT: NORMAL

## 2025-07-03 ENCOUNTER — PREP FOR PROCEDURE (OUTPATIENT)
Dept: UROLOGY | Facility: AMBULATORY SURGERY CENTER | Age: 75
End: 2025-07-03

## 2025-07-03 ENCOUNTER — PREP FOR PROCEDURE (OUTPATIENT)
Dept: UROLOGY | Facility: CLINIC | Age: 75
End: 2025-07-03

## 2025-07-03 DIAGNOSIS — Z79.01 LONG TERM (CURRENT) USE OF ANTICOAGULANTS: ICD-10-CM

## 2025-07-03 DIAGNOSIS — Z01.812 PRE-OPERATIVE LABORATORY EXAMINATION: ICD-10-CM

## 2025-07-03 DIAGNOSIS — N32.89 BLADDER MASS: Primary | ICD-10-CM

## 2025-07-03 DIAGNOSIS — R39.89 SUSPECTED UTI: ICD-10-CM

## 2025-07-03 RX ORDER — CEFAZOLIN SODIUM 2 G/50ML
2000 SOLUTION INTRAVENOUS ONCE
OUTPATIENT
Start: 2025-07-03 | End: 2025-07-03

## 2025-07-07 ENCOUNTER — ANESTHESIA EVENT (OUTPATIENT)
Dept: PERIOP | Facility: HOSPITAL | Age: 75
DRG: 267 | End: 2025-07-07
Payer: MEDICARE

## 2025-07-07 NOTE — ANESTHESIA PREPROCEDURE EVALUATION
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 Wall thickness is moderately increased. The left ventricular ejection fraction is 70%. Systolic function is hyperdynamic. Wall motion is normal. Unable to assess diastolic function.   Right Ventricle Right ventricular cavity size is normal. Systolic function is normal.   Left Atrium The atrium is mildly dilated.   Right Atrium The atrium is normal in size.   Atrial Septum No patent foramen ovale detected, confirmed by provocation with cough, using agitated saline contrast and with valsalva, using agitated saline contrast.   Aortic Valve The leaflets are severely calcified. The leaflets exhibit normal mobility. There is no evidence of regurgitation. There is severe stenosis. The aortic valve peak velocity is 5.62 m/s. The aortic valve mean gradient is 85 mmHg. The dimensionless velocity index is 0.18. The aortic valve area is 0.42 cm2.   Mitral Valve There is severe annular calcification.  There is mild regurgitation. There is mild stenosis. The mitral valve mean gradient is 4mmHg.   Tricuspid Valve Tricuspid valve structure is normal. There is no evidence of regurgitation. There is no evidence of stenosis.   Pulmonic Valve Pulmonic valve structure is normal. There is no evidence of regurgitation. There is no evidence of stenosis.   Ascending Aorta The aortic root is normal in size.   IVC/SVC The right atrial pressure is  estimated at 10.0 mmHg. The inferior vena cava is normal in size. Respirophasic changes were blunted (less than 50% variation).   Pericardium There is no pericardial effusion. The pericardium is normal in appearance.       Normal sinus rhythm  Minimal voltage criteria for LVH, may be normal variant  Nonspecific T wave abnormality  Abnormal ECG  When compared with ECG of 17-Apr-2025 11:12,  Nonspecific T wave abnormality, worse in Lateral leads    mpression    No obstructive epicardial CAD.     Recommendation    Recommendation TAVR     Physical Exam    Airway     Mallampati score: II  TM Distance: >3 FB  Neck ROM: full  Mouth opening: >= 4 cm      Cardiovascular  Rhythm: regular, Rate: normal, MurmurCardiovascular exam normal    Dental    edentulous    Pulmonary  Pulmonary exam normal Breath sounds clear to auscultation    Neurological  - normal exam  He appears awake, alert and oriented x3.      Other Findings        Anesthesia Plan  ASA Score- 4     Anesthesia Type- general with ASA Monitors.         Additional Monitors: arterial line.    Airway Plan: Oral ETT.           Plan Factors-Exercise tolerance (METS): <4 METS.    Chart reviewed. EKG reviewed.  Existing labs reviewed. Patient summary reviewed.    Patient is not a current smoker.              Induction- intravenous.    Postoperative Plan- .   Monitoring Plan - Monitoring plan - standard ASA monitoring  Post Operative Pain Plan - multimodal analgesia        Informed Consent- Anesthetic plan and risks discussed with patient and son.  I personally reviewed this patient with the CRNA. Discussed and agreed on the Anesthesia Plan with the CRNA..      NPO Status:  No vitals data found for the desired time range.

## 2025-07-08 ENCOUNTER — HOSPITAL ENCOUNTER (INPATIENT)
Facility: HOSPITAL | Age: 75
LOS: 2 days | Discharge: HOME HEALTH | DRG: 267 | End: 2025-07-10
Attending: THORACIC SURGERY (CARDIOTHORACIC VASCULAR SURGERY) | Admitting: THORACIC SURGERY (CARDIOTHORACIC VASCULAR SURGERY)
Payer: MEDICARE

## 2025-07-08 ENCOUNTER — APPOINTMENT (INPATIENT)
Dept: NON INVASIVE DIAGNOSTICS | Facility: HOSPITAL | Age: 75
DRG: 267 | End: 2025-07-08
Payer: MEDICARE

## 2025-07-08 ENCOUNTER — APPOINTMENT (INPATIENT)
Dept: RADIOLOGY | Facility: HOSPITAL | Age: 75
DRG: 267 | End: 2025-07-08
Payer: MEDICARE

## 2025-07-08 ENCOUNTER — ANESTHESIA (OUTPATIENT)
Dept: PERIOP | Facility: HOSPITAL | Age: 75
DRG: 267 | End: 2025-07-08
Payer: MEDICARE

## 2025-07-08 DIAGNOSIS — I35.0 SEVERE AORTIC STENOSIS: ICD-10-CM

## 2025-07-08 DIAGNOSIS — I35.9 NONRHEUMATIC AORTIC VALVE DISORDER: ICD-10-CM

## 2025-07-08 DIAGNOSIS — R20.2 NUMBNESS AND TINGLING IN LEFT HAND: ICD-10-CM

## 2025-07-08 DIAGNOSIS — Z95.2 S/P TAVR (TRANSCATHETER AORTIC VALVE REPLACEMENT): Primary | ICD-10-CM

## 2025-07-08 DIAGNOSIS — R20.0 NUMBNESS AND TINGLING IN LEFT HAND: ICD-10-CM

## 2025-07-08 DIAGNOSIS — F03.A0 MILD DEMENTIA WITHOUT BEHAVIORAL DISTURBANCE, PSYCHOTIC DISTURBANCE, MOOD DISTURBANCE, OR ANXIETY, UNSPECIFIED DEMENTIA TYPE (HCC): ICD-10-CM

## 2025-07-08 DIAGNOSIS — Z95.2 S/P TAVR (TRANSCATHETER AORTIC VALVE REPLACEMENT): ICD-10-CM

## 2025-07-08 DIAGNOSIS — I35.0 SEVERE AORTIC STENOSIS: Primary | ICD-10-CM

## 2025-07-08 PROBLEM — Z91.89 AT RISK FOR DELIRIUM: Status: ACTIVE | Noted: 2025-07-08

## 2025-07-08 PROBLEM — R91.8 MULTIPLE PULMONARY NODULES: Status: ACTIVE | Noted: 2025-07-08

## 2025-07-08 PROBLEM — R56.9 SEIZURES (HCC): Status: ACTIVE | Noted: 2025-07-08

## 2025-07-08 PROBLEM — J43.8 OTHER EMPHYSEMA (HCC): Status: ACTIVE | Noted: 2025-07-08

## 2025-07-08 LAB
ABO GROUP BLD BPU: NORMAL
ABO GROUP BLD: NORMAL
ANION GAP SERPL CALCULATED.3IONS-SCNC: 6 MMOL/L (ref 4–13)
AORTIC ROOT: 3 CM
AORTIC VALVE MEAN VELOCITY: 13.6 M/S
AORTIC VALVE MEAN VELOCITY: 29 M/S
ASCENDING AORTA: 2.5 CM
ATRIAL RATE: 67 BPM
ATRIAL RATE: 73 BPM
AV AREA BY CONTINUOUS VTI: 0.4 CM2
AV AREA BY CONTINUOUS VTI: 1.8 CM2
AV AREA PEAK VELOCITY: 0.3 CM2
AV AREA PEAK VELOCITY: 1.6 CM2
AV LVOT MEAN GRADIENT: 1 MMHG
AV LVOT MEAN GRADIENT: 3 MMHG
AV LVOT PEAK GRADIENT: 1 MMHG
AV LVOT PEAK GRADIENT: 5 MMHG
AV MEAN PRESS GRAD SYS DOP V1V2: 40 MMHG
AV MEAN PRESS GRAD SYS DOP V1V2: 8 MMHG
AV ORIFICE AREA US: 0.38 CM2
AV ORIFICE AREA US: 1.78 CM2
AV PEAK GRADIENT: 16 MMHG
AV PEAK GRADIENT: 68 MMHG
AV VELOCITY RATIO: 0.13
AV VELOCITY RATIO: 0.57
AV VMAX SYS DOP: 2.01 M/S
BASE EXCESS BLDA CALC-SCNC: -1 MMOL/L (ref -2–3)
BASE EXCESS BLDA CALC-SCNC: -3 MMOL/L (ref -2–3)
BASE EXCESS BLDA CALC-SCNC: -4 MMOL/L (ref -2–3)
BLD GP AB SCN SERPL QL: POSITIVE
BPU ID: NORMAL
BSA FOR ECHO PROCEDURE: 2.21 M2
BUN SERPL-MCNC: 25 MG/DL (ref 5–25)
CA-I BLD-SCNC: 1.18 MMOL/L (ref 1.12–1.32)
CA-I BLD-SCNC: 1.22 MMOL/L (ref 1.12–1.32)
CA-I BLD-SCNC: 1.26 MMOL/L (ref 1.12–1.32)
CALCIUM SERPL-MCNC: 8.7 MG/DL (ref 8.4–10.2)
CHLORIDE SERPL-SCNC: 106 MMOL/L (ref 96–108)
CO2 SERPL-SCNC: 25 MMOL/L (ref 21–32)
CREAT SERPL-MCNC: 0.96 MG/DL (ref 0.6–1.3)
CROSSMATCH: NORMAL
DOP CALC AO VTI: 27.53 CM
DOP CALC AO VTI: 98.7 CM
DOP CALC LVOT AREA: 2.8
DOP CALC LVOT AREA: 3.14 CM2
DOP CALC LVOT CARDIAC INDEX: 1.68 L/MIN/M2
DOP CALC LVOT CARDIAC OUTPUT: 3.71 L/MIN
DOP CALC LVOT DIAMETER: 1.9 CM
DOP CALC LVOT DIAMETER: 2 CM
DOP CALC LVOT PEAK VEL VTI: 13.1 CM
DOP CALC LVOT PEAK VEL VTI: 15.57 CM
DOP CALC LVOT PEAK VEL: 0.5 M/S
DOP CALC LVOT PEAK VEL: 1.05 M/S
DOP CALC LVOT STROKE INDEX: 16.7 ML/M2
DOP CALC LVOT STROKE INDEX: 22.2 ML/M2
DOP CALC LVOT STROKE VOLUME: 37
DOP CALC LVOT STROKE VOLUME: 48.89
DOP CALC MV VTI: 36.63 CM
E WAVE DECELERATION TIME: 342 MS
E/A RATIO: 0.71
FRACTIONAL SHORTENING: 44 (ref 28–44)
GFR SERPL CREATININE-BSD FRML MDRD: 76 ML/MIN/1.73SQ M
GLUCOSE SERPL-MCNC: 89 MG/DL (ref 65–140)
GLUCOSE SERPL-MCNC: 95 MG/DL (ref 65–140)
GLUCOSE SERPL-MCNC: 95 MG/DL (ref 65–140)
GLUCOSE SERPL-MCNC: 97 MG/DL (ref 65–140)
GLUCOSE SERPL-MCNC: 99 MG/DL (ref 65–140)
HCO3 BLDA-SCNC: 22 MMOL/L (ref 22–28)
HCO3 BLDA-SCNC: 23.1 MMOL/L (ref 22–28)
HCO3 BLDA-SCNC: 26 MMOL/L (ref 24–30)
HCT VFR BLD AUTO: 37.8 % (ref 36.5–49.3)
HCT VFR BLD CALC: 32 % (ref 36.5–49.3)
HCT VFR BLD CALC: 33 % (ref 36.5–49.3)
HCT VFR BLD CALC: 38 % (ref 36.5–49.3)
HGB BLD-MCNC: 12.6 G/DL (ref 12–17)
HGB BLDA-MCNC: 10.9 G/DL (ref 12–17)
HGB BLDA-MCNC: 11.2 G/DL (ref 12–17)
HGB BLDA-MCNC: 12.9 G/DL (ref 12–17)
INTERVENTRICULAR SEPTUM IN DIASTOLE (PARASTERNAL SHORT AXIS VIEW): 1.6 CM
INTERVENTRICULAR SEPTUM: 1.6 CM (ref 0.6–1.1)
KCT BLD-ACNC: 119 SEC (ref 89–137)
KCT BLD-ACNC: 142 SEC (ref 89–137)
KCT BLD-ACNC: 262 SEC (ref 89–137)
LAAS-AP2: 23.1 CM2
LAAS-AP4: 20.4 CM2
LEFT ATRIUM SIZE: 3.6 CM
LEFT ATRIUM VOLUME (MOD BIPLANE): 66 ML
LEFT ATRIUM VOLUME INDEX (MOD BIPLANE): 29.9 ML/M2
LEFT INTERNAL DIMENSION IN SYSTOLE: 2.4 CM (ref 2.1–4)
LEFT VENTRICULAR INTERNAL DIMENSION IN DIASTOLE: 4.3 CM (ref 3.5–6)
LEFT VENTRICULAR POSTERIOR WALL IN END DIASTOLE: 1.6 CM
LEFT VENTRICULAR STROKE VOLUME: 61 ML
LV EF US.2D.A4C+ESTIMATED: 70 %
LVSV (TEICH): 61 ML
Lab: NORMAL
MV E'TISSUE VEL-LAT: 5 CM/S
MV E'TISSUE VEL-SEP: 4 CM/S
MV MEAN GRADIENT: 4 MMHG
MV PEAK A VEL: 1.38 M/S
MV PEAK E VEL: 98 CM/S
MV PEAK GRADIENT: 8 MMHG
MV STENOSIS PRESSURE HALF TIME: 99 MS
MV VALVE AREA BY CONTINUITY EQUATION: 1.33 CM2
MV VALVE AREA P 1/2 METHOD: 2.22
P AXIS: 84 DEGREES
P AXIS: 84 DEGREES
PCO2 BLD: 23 MMOL/L (ref 21–32)
PCO2 BLD: 24 MMOL/L (ref 21–32)
PCO2 BLD: 27 MMOL/L (ref 21–32)
PCO2 BLD: 41.5 MM HG (ref 36–44)
PCO2 BLD: 45 MM HG (ref 36–44)
PCO2 BLD: 50.7 MM HG (ref 42–50)
PH BLD: 7.32 [PH] (ref 7.35–7.45)
PH BLD: 7.32 [PH] (ref 7.3–7.4)
PH BLD: 7.33 [PH] (ref 7.35–7.45)
PLATELET # BLD AUTO: 154 THOUSANDS/UL (ref 149–390)
PMV BLD AUTO: 9 FL (ref 8.9–12.7)
PO2 BLD: 170 MM HG (ref 75–129)
PO2 BLD: 54 MM HG (ref 35–45)
PO2 BLD: 99 MM HG (ref 75–129)
POTASSIUM BLD-SCNC: 3.9 MMOL/L (ref 3.5–5.3)
POTASSIUM BLD-SCNC: 4 MMOL/L (ref 3.5–5.3)
POTASSIUM BLD-SCNC: 4.1 MMOL/L (ref 3.5–5.3)
POTASSIUM SERPL-SCNC: 4.1 MMOL/L (ref 3.5–5.3)
PR INTERVAL: 142 MS
PR INTERVAL: 144 MS
QRS AXIS: 19 DEGREES
QRS AXIS: 77 DEGREES
QRSD INTERVAL: 92 MS
QRSD INTERVAL: 96 MS
QT INTERVAL: 446 MS
QT INTERVAL: 470 MS
QTC INTERVAL: 491 MS
QTC INTERVAL: 496 MS
RA PRESSURE ESTIMATED: 3 MMHG
RH BLD: POSITIVE
RIGHT ATRIUM AREA SYSTOLE A4C: 20.2 CM2
RIGHT VENTRICLE ID DIMENSION: 3 CM
RV PSP: 27 MMHG
SAO2 % BLD FROM PO2: 85 % (ref 60–85)
SAO2 % BLD FROM PO2: 97 % (ref 60–85)
SAO2 % BLD FROM PO2: 99 % (ref 60–85)
SL CV LEFT ATRIUM LENGTH A2C: 5.4 CM
SL CV LV EF: 70
SL CV PED ECHO LEFT VENTRICLE DIASTOLIC VOLUME (MOD BIPLANE) 2D: 81 ML
SL CV PED ECHO LEFT VENTRICLE SYSTOLIC VOLUME (MOD BIPLANE) 2D: 19 ML
SODIUM BLD-SCNC: 138 MMOL/L (ref 136–145)
SODIUM BLD-SCNC: 139 MMOL/L (ref 136–145)
SODIUM BLD-SCNC: 139 MMOL/L (ref 136–145)
SODIUM SERPL-SCNC: 137 MMOL/L (ref 135–147)
SPECIMEN EXPIRATION DATE: NORMAL
SPECIMEN SOURCE: ABNORMAL
SPECIMEN SOURCE: NORMAL
T WAVE AXIS: 78 DEGREES
T WAVE AXIS: 91 DEGREES
TR MAX PG: 24 MMHG
TR PEAK VELOCITY: 2.4 M/S
TRICUSPID ANNULAR PLANE SYSTOLIC EXCURSION: 2 CM
TRICUSPID VALVE PEAK REGURGITATION VELOCITY: 2.44 M/S
UNIT DISPENSE STATUS: NORMAL
UNIT PRODUCT CODE: NORMAL
UNIT PRODUCT VOLUME: 350 ML
UNIT RH: NORMAL
VENTRICULAR RATE: 67 BPM
VENTRICULAR RATE: 73 BPM

## 2025-07-08 PROCEDURE — 93010 ELECTROCARDIOGRAM REPORT: CPT | Performed by: INTERNAL MEDICINE

## 2025-07-08 PROCEDURE — 93005 ELECTROCARDIOGRAM TRACING: CPT

## 2025-07-08 PROCEDURE — 86850 RBC ANTIBODY SCREEN: CPT | Performed by: THORACIC SURGERY (CARDIOTHORACIC VASCULAR SURGERY)

## 2025-07-08 PROCEDURE — 80048 BASIC METABOLIC PNL TOTAL CA: CPT | Performed by: PHYSICIAN ASSISTANT

## 2025-07-08 PROCEDURE — 02HV33Z INSERTION OF INFUSION DEVICE INTO SUPERIOR VENA CAVA, PERCUTANEOUS APPROACH: ICD-10-PCS | Performed by: ANESTHESIOLOGY

## 2025-07-08 PROCEDURE — C1769 GUIDE WIRE: HCPCS | Performed by: THORACIC SURGERY (CARDIOTHORACIC VASCULAR SURGERY)

## 2025-07-08 PROCEDURE — 85018 HEMOGLOBIN: CPT | Performed by: PHYSICIAN ASSISTANT

## 2025-07-08 PROCEDURE — 82948 REAGENT STRIP/BLOOD GLUCOSE: CPT

## 2025-07-08 PROCEDURE — 93306 TTE W/DOPPLER COMPLETE: CPT | Performed by: INTERNAL MEDICINE

## 2025-07-08 PROCEDURE — 85347 COAGULATION TIME ACTIVATED: CPT

## 2025-07-08 PROCEDURE — 33361 REPLACE AORTIC VALVE PERQ: CPT | Performed by: THORACIC SURGERY (CARDIOTHORACIC VASCULAR SURGERY)

## 2025-07-08 PROCEDURE — 82330 ASSAY OF CALCIUM: CPT

## 2025-07-08 PROCEDURE — 02RF38Z REPLACEMENT OF AORTIC VALVE WITH ZOOPLASTIC TISSUE, PERCUTANEOUS APPROACH: ICD-10-PCS | Performed by: THORACIC SURGERY (CARDIOTHORACIC VASCULAR SURGERY)

## 2025-07-08 PROCEDURE — 86900 BLOOD TYPING SEROLOGIC ABO: CPT | Performed by: THORACIC SURGERY (CARDIOTHORACIC VASCULAR SURGERY)

## 2025-07-08 PROCEDURE — 93306 TTE W/DOPPLER COMPLETE: CPT

## 2025-07-08 PROCEDURE — C1781 MESH (IMPLANTABLE): HCPCS | Performed by: THORACIC SURGERY (CARDIOTHORACIC VASCULAR SURGERY)

## 2025-07-08 PROCEDURE — 93355 ECHO TRANSESOPHAGEAL (TEE): CPT

## 2025-07-08 PROCEDURE — 84295 ASSAY OF SERUM SODIUM: CPT

## 2025-07-08 PROCEDURE — 85014 HEMATOCRIT: CPT | Performed by: PHYSICIAN ASSISTANT

## 2025-07-08 PROCEDURE — 85049 AUTOMATED PLATELET COUNT: CPT | Performed by: PHYSICIAN ASSISTANT

## 2025-07-08 PROCEDURE — 85014 HEMATOCRIT: CPT

## 2025-07-08 PROCEDURE — C1894 INTRO/SHEATH, NON-LASER: HCPCS | Performed by: THORACIC SURGERY (CARDIOTHORACIC VASCULAR SURGERY)

## 2025-07-08 PROCEDURE — 94664 DEMO&/EVAL PT USE INHALER: CPT

## 2025-07-08 PROCEDURE — NC001 PR NO CHARGE: Performed by: PHYSICIAN ASSISTANT

## 2025-07-08 PROCEDURE — 82803 BLOOD GASES ANY COMBINATION: CPT

## 2025-07-08 PROCEDURE — 76377 3D RENDER W/INTRP POSTPROCES: CPT

## 2025-07-08 PROCEDURE — 82947 ASSAY GLUCOSE BLOOD QUANT: CPT

## 2025-07-08 PROCEDURE — 86921 COMPATIBILITY TEST INCUBATE: CPT

## 2025-07-08 PROCEDURE — C1751 CATH, INF, PER/CENT/MIDLINE: HCPCS | Performed by: THORACIC SURGERY (CARDIOTHORACIC VASCULAR SURGERY)

## 2025-07-08 PROCEDURE — 86901 BLOOD TYPING SEROLOGIC RH(D): CPT | Performed by: THORACIC SURGERY (CARDIOTHORACIC VASCULAR SURGERY)

## 2025-07-08 PROCEDURE — 33361 REPLACE AORTIC VALVE PERQ: CPT | Performed by: INTERNAL MEDICINE

## 2025-07-08 PROCEDURE — C1760 CLOSURE DEV, VASC: HCPCS | Performed by: THORACIC SURGERY (CARDIOTHORACIC VASCULAR SURGERY)

## 2025-07-08 PROCEDURE — 86922 COMPATIBILITY TEST ANTIGLOB: CPT

## 2025-07-08 PROCEDURE — 84132 ASSAY OF SERUM POTASSIUM: CPT

## 2025-07-08 PROCEDURE — 71045 X-RAY EXAM CHEST 1 VIEW: CPT

## 2025-07-08 DEVICE — IMPLANTABLE DEVICE: Type: IMPLANTABLE DEVICE | Site: AORTA | Status: FUNCTIONAL

## 2025-07-08 DEVICE — PERCLOSE™ PROSTYLE™ SUTURE-MEDIATED CLOSURE AND REPAIR SYSTEM
Type: IMPLANTABLE DEVICE | Site: GROIN | Status: FUNCTIONAL
Brand: PERCLOSE™ PROSTYLE™

## 2025-07-08 RX ORDER — ACETAMINOPHEN 325 MG/1
650 TABLET ORAL EVERY 4 HOURS PRN
Status: DISCONTINUED | OUTPATIENT
Start: 2025-07-08 | End: 2025-07-10 | Stop reason: HOSPADM

## 2025-07-08 RX ORDER — MEMANTINE HYDROCHLORIDE 10 MG/1
10 TABLET ORAL 2 TIMES DAILY
Status: DISCONTINUED | OUTPATIENT
Start: 2025-07-08 | End: 2025-07-10 | Stop reason: HOSPADM

## 2025-07-08 RX ORDER — LOSARTAN POTASSIUM 50 MG/1
100 TABLET ORAL DAILY
Status: DISCONTINUED | OUTPATIENT
Start: 2025-07-08 | End: 2025-07-09

## 2025-07-08 RX ORDER — METOPROLOL SUCCINATE 50 MG/1
50 TABLET, EXTENDED RELEASE ORAL 2 TIMES DAILY
Status: DISCONTINUED | OUTPATIENT
Start: 2025-07-08 | End: 2025-07-09

## 2025-07-08 RX ORDER — LABETALOL HYDROCHLORIDE 5 MG/ML
10 INJECTION, SOLUTION INTRAVENOUS
Status: DISCONTINUED | OUTPATIENT
Start: 2025-07-08 | End: 2025-07-08 | Stop reason: HOSPADM

## 2025-07-08 RX ORDER — FONDAPARINUX SODIUM 2.5 MG/.5ML
2.5 INJECTION SUBCUTANEOUS DAILY
Status: DISCONTINUED | OUTPATIENT
Start: 2025-07-09 | End: 2025-07-10 | Stop reason: HOSPADM

## 2025-07-08 RX ORDER — DONEPEZIL HYDROCHLORIDE 10 MG/1
10 TABLET, FILM COATED ORAL
Status: DISCONTINUED | OUTPATIENT
Start: 2025-07-08 | End: 2025-07-10 | Stop reason: HOSPADM

## 2025-07-08 RX ORDER — SODIUM CHLORIDE, SODIUM LACTATE, POTASSIUM CHLORIDE, CALCIUM CHLORIDE 600; 310; 30; 20 MG/100ML; MG/100ML; MG/100ML; MG/100ML
INJECTION, SOLUTION INTRAVENOUS CONTINUOUS PRN
Status: DISCONTINUED | OUTPATIENT
Start: 2025-07-08 | End: 2025-07-08

## 2025-07-08 RX ORDER — PROMETHAZINE HYDROCHLORIDE 25 MG/ML
6.25 INJECTION, SOLUTION INTRAMUSCULAR; INTRAVENOUS ONCE AS NEEDED
Status: DISCONTINUED | OUTPATIENT
Start: 2025-07-08 | End: 2025-07-08 | Stop reason: HOSPADM

## 2025-07-08 RX ORDER — CARBAMAZEPINE 200 MG/1
400 TABLET, EXTENDED RELEASE ORAL 2 TIMES DAILY
Status: DISCONTINUED | OUTPATIENT
Start: 2025-07-08 | End: 2025-07-08

## 2025-07-08 RX ORDER — PHENYLEPHRINE HCL IN 0.9% NACL 1 MG/10 ML
200-2000 SYRINGE (ML) INTRAVENOUS ONCE
Status: COMPLETED | OUTPATIENT
Start: 2025-07-08 | End: 2025-07-08

## 2025-07-08 RX ORDER — PROTAMINE SULFATE 10 MG/ML
INJECTION, SOLUTION INTRAVENOUS AS NEEDED
Status: DISCONTINUED | OUTPATIENT
Start: 2025-07-08 | End: 2025-07-08

## 2025-07-08 RX ORDER — POLYETHYLENE GLYCOL 3350 17 G/17G
17 POWDER, FOR SOLUTION ORAL DAILY
Status: DISCONTINUED | OUTPATIENT
Start: 2025-07-08 | End: 2025-07-10 | Stop reason: HOSPADM

## 2025-07-08 RX ORDER — BISACODYL 10 MG
10 SUPPOSITORY, RECTAL RECTAL DAILY PRN
Status: DISCONTINUED | OUTPATIENT
Start: 2025-07-08 | End: 2025-07-10 | Stop reason: HOSPADM

## 2025-07-08 RX ORDER — LIDOCAINE HYDROCHLORIDE 10 MG/ML
INJECTION, SOLUTION INFILTRATION; PERINEURAL AS NEEDED
Status: DISCONTINUED | OUTPATIENT
Start: 2025-07-08 | End: 2025-07-08

## 2025-07-08 RX ORDER — ONDANSETRON 2 MG/ML
4 INJECTION INTRAMUSCULAR; INTRAVENOUS ONCE AS NEEDED
Status: DISCONTINUED | OUTPATIENT
Start: 2025-07-08 | End: 2025-07-08 | Stop reason: HOSPADM

## 2025-07-08 RX ORDER — LIDOCAINE HYDROCHLORIDE 10 MG/ML
INJECTION, SOLUTION EPIDURAL; INFILTRATION; INTRACAUDAL; PERINEURAL
Status: COMPLETED
Start: 2025-07-08 | End: 2025-07-08

## 2025-07-08 RX ORDER — ALBUTEROL SULFATE 0.83 MG/ML
2.5 SOLUTION RESPIRATORY (INHALATION) ONCE AS NEEDED
Status: DISCONTINUED | OUTPATIENT
Start: 2025-07-08 | End: 2025-07-08 | Stop reason: HOSPADM

## 2025-07-08 RX ORDER — LIDOCAINE HYDROCHLORIDE 10 MG/ML
INJECTION, SOLUTION EPIDURAL; INFILTRATION; INTRACAUDAL; PERINEURAL
Status: COMPLETED | OUTPATIENT
Start: 2025-07-08 | End: 2025-07-08

## 2025-07-08 RX ORDER — SODIUM CHLORIDE 9 MG/ML
INJECTION, SOLUTION INTRAVENOUS CONTINUOUS PRN
Status: DISCONTINUED | OUTPATIENT
Start: 2025-07-08 | End: 2025-07-08

## 2025-07-08 RX ORDER — CLOPIDOGREL BISULFATE 75 MG/1
75 TABLET ORAL DAILY
Status: DISCONTINUED | OUTPATIENT
Start: 2025-07-08 | End: 2025-07-10 | Stop reason: HOSPADM

## 2025-07-08 RX ORDER — FENTANYL CITRATE/PF 50 MCG/ML
25 SYRINGE (ML) INJECTION
Status: DISCONTINUED | OUTPATIENT
Start: 2025-07-08 | End: 2025-07-08 | Stop reason: HOSPADM

## 2025-07-08 RX ORDER — FUROSEMIDE 10 MG/ML
40 INJECTION INTRAMUSCULAR; INTRAVENOUS ONCE
Status: COMPLETED | OUTPATIENT
Start: 2025-07-08 | End: 2025-07-08

## 2025-07-08 RX ORDER — ACETAMINOPHEN 650 MG/1
650 SUPPOSITORY RECTAL EVERY 4 HOURS PRN
Status: DISCONTINUED | OUTPATIENT
Start: 2025-07-08 | End: 2025-07-10 | Stop reason: HOSPADM

## 2025-07-08 RX ORDER — HYDROMORPHONE HCL IN WATER/PF 6 MG/30 ML
0.2 PATIENT CONTROLLED ANALGESIA SYRINGE INTRAVENOUS
Status: DISCONTINUED | OUTPATIENT
Start: 2025-07-08 | End: 2025-07-08 | Stop reason: HOSPADM

## 2025-07-08 RX ORDER — HYDROMORPHONE HCL/PF 1 MG/ML
0.5 SYRINGE (ML) INJECTION
Status: DISCONTINUED | OUTPATIENT
Start: 2025-07-08 | End: 2025-07-08 | Stop reason: HOSPADM

## 2025-07-08 RX ORDER — CHLORHEXIDINE GLUCONATE ORAL RINSE 1.2 MG/ML
15 SOLUTION DENTAL ONCE
Status: COMPLETED | OUTPATIENT
Start: 2025-07-08 | End: 2025-07-08

## 2025-07-08 RX ORDER — HYDRALAZINE HYDROCHLORIDE 20 MG/ML
5 INJECTION INTRAMUSCULAR; INTRAVENOUS
Status: DISCONTINUED | OUTPATIENT
Start: 2025-07-08 | End: 2025-07-08 | Stop reason: HOSPADM

## 2025-07-08 RX ORDER — PROPOFOL 10 MG/ML
INJECTION, EMULSION INTRAVENOUS AS NEEDED
Status: DISCONTINUED | OUTPATIENT
Start: 2025-07-08 | End: 2025-07-08

## 2025-07-08 RX ORDER — METOCLOPRAMIDE HYDROCHLORIDE 5 MG/ML
10 INJECTION INTRAMUSCULAR; INTRAVENOUS ONCE AS NEEDED
Status: DISCONTINUED | OUTPATIENT
Start: 2025-07-08 | End: 2025-07-08 | Stop reason: HOSPADM

## 2025-07-08 RX ORDER — ONDANSETRON 2 MG/ML
4 INJECTION INTRAMUSCULAR; INTRAVENOUS EVERY 6 HOURS PRN
Status: DISCONTINUED | OUTPATIENT
Start: 2025-07-08 | End: 2025-07-10 | Stop reason: HOSPADM

## 2025-07-08 RX ORDER — CEFAZOLIN SODIUM 2 G/50ML
2000 SOLUTION INTRAVENOUS EVERY 8 HOURS
Status: COMPLETED | OUTPATIENT
Start: 2025-07-08 | End: 2025-07-09

## 2025-07-08 RX ORDER — CALCIUM GLUCONATE 20 MG/ML
2 INJECTION, SOLUTION INTRAVENOUS ONCE AS NEEDED
Status: DISCONTINUED | OUTPATIENT
Start: 2025-07-08 | End: 2025-07-09

## 2025-07-08 RX ORDER — ONDANSETRON 2 MG/ML
INJECTION INTRAMUSCULAR; INTRAVENOUS AS NEEDED
Status: DISCONTINUED | OUTPATIENT
Start: 2025-07-08 | End: 2025-07-08

## 2025-07-08 RX ORDER — PANTOPRAZOLE SODIUM 40 MG/1
40 TABLET, DELAYED RELEASE ORAL
Status: DISCONTINUED | OUTPATIENT
Start: 2025-07-08 | End: 2025-07-10 | Stop reason: HOSPADM

## 2025-07-08 RX ORDER — HEPARIN SODIUM 1000 [USP'U]/ML
400 INJECTION, SOLUTION INTRAVENOUS; SUBCUTANEOUS ONCE
Status: COMPLETED | OUTPATIENT
Start: 2025-07-08 | End: 2025-07-08

## 2025-07-08 RX ORDER — HYDRALAZINE HYDROCHLORIDE 20 MG/ML
10 INJECTION INTRAMUSCULAR; INTRAVENOUS EVERY 6 HOURS PRN
Status: DISCONTINUED | OUTPATIENT
Start: 2025-07-08 | End: 2025-07-10 | Stop reason: HOSPADM

## 2025-07-08 RX ORDER — CARBAMAZEPINE 200 MG/1
400 TABLET, EXTENDED RELEASE ORAL ONCE
Status: COMPLETED | OUTPATIENT
Start: 2025-07-08 | End: 2025-07-08

## 2025-07-08 RX ORDER — CEFAZOLIN SODIUM 2 G/50ML
2000 SOLUTION INTRAVENOUS ONCE
Status: COMPLETED | OUTPATIENT
Start: 2025-07-08 | End: 2025-07-08

## 2025-07-08 RX ORDER — CHLORHEXIDINE GLUCONATE ORAL RINSE 1.2 MG/ML
15 SOLUTION DENTAL 2 TIMES DAILY
Status: DISCONTINUED | OUTPATIENT
Start: 2025-07-08 | End: 2025-07-10 | Stop reason: HOSPADM

## 2025-07-08 RX ORDER — ROCURONIUM BROMIDE 10 MG/ML
INJECTION, SOLUTION INTRAVENOUS AS NEEDED
Status: DISCONTINUED | OUTPATIENT
Start: 2025-07-08 | End: 2025-07-08

## 2025-07-08 RX ORDER — ATORVASTATIN CALCIUM 40 MG/1
40 TABLET, FILM COATED ORAL
Status: DISCONTINUED | OUTPATIENT
Start: 2025-07-08 | End: 2025-07-10 | Stop reason: HOSPADM

## 2025-07-08 RX ORDER — GABAPENTIN 300 MG/1
300 CAPSULE ORAL
Status: DISCONTINUED | OUTPATIENT
Start: 2025-07-08 | End: 2025-07-10 | Stop reason: HOSPADM

## 2025-07-08 RX ORDER — CARBAMAZEPINE 200 MG/1
400 TABLET, EXTENDED RELEASE ORAL 2 TIMES DAILY
Status: DISCONTINUED | OUTPATIENT
Start: 2025-07-09 | End: 2025-07-10 | Stop reason: HOSPADM

## 2025-07-08 RX ORDER — FENTANYL CITRATE 50 UG/ML
INJECTION, SOLUTION INTRAMUSCULAR; INTRAVENOUS AS NEEDED
Status: DISCONTINUED | OUTPATIENT
Start: 2025-07-08 | End: 2025-07-08

## 2025-07-08 RX ORDER — ASPIRIN 81 MG/1
81 TABLET, CHEWABLE ORAL DAILY
Status: DISCONTINUED | OUTPATIENT
Start: 2025-07-08 | End: 2025-07-10 | Stop reason: HOSPADM

## 2025-07-08 RX ADMIN — DONEPEZIL HYDROCHLORIDE 10 MG: 10 TABLET ORAL at 21:46

## 2025-07-08 RX ADMIN — METOPROLOL SUCCINATE 50 MG: 50 TABLET, EXTENDED RELEASE ORAL at 21:46

## 2025-07-08 RX ADMIN — MEMANTINE 10 MG: 10 TABLET ORAL at 12:20

## 2025-07-08 RX ADMIN — ONDANSETRON 4 MG: 2 INJECTION INTRAMUSCULAR; INTRAVENOUS at 08:09

## 2025-07-08 RX ADMIN — FENTANYL CITRATE 50 MCG: 50 INJECTION INTRAMUSCULAR; INTRAVENOUS at 07:46

## 2025-07-08 RX ADMIN — NICARDIPINE HYDROCHLORIDE 5 MG/HR: 2.5 INJECTION, SOLUTION INTRAVENOUS at 08:49

## 2025-07-08 RX ADMIN — CHLORHEXIDINE GLUCONATE 15 ML: 1.2 SOLUTION ORAL at 12:20

## 2025-07-08 RX ADMIN — CEFAZOLIN SODIUM 2000 MG: 2 SOLUTION INTRAVENOUS at 17:36

## 2025-07-08 RX ADMIN — NICARDIPINE HYDROCHLORIDE 300 MCG: 2.5 INJECTION, SOLUTION INTRAVENOUS at 08:52

## 2025-07-08 RX ADMIN — ROCURONIUM BROMIDE 50 MG: 50 INJECTION INTRAVENOUS at 07:46

## 2025-07-08 RX ADMIN — ASPIRIN 81 MG CHEWABLE TABLET 81 MG: 81 TABLET CHEWABLE at 12:19

## 2025-07-08 RX ADMIN — FENTANYL CITRATE 50 MCG: 50 INJECTION INTRAMUSCULAR; INTRAVENOUS at 07:40

## 2025-07-08 RX ADMIN — SUGAMMADEX 200 MG: 100 INJECTION, SOLUTION INTRAVENOUS at 08:41

## 2025-07-08 RX ADMIN — LOSARTAN POTASSIUM 100 MG: 50 TABLET, FILM COATED ORAL at 12:19

## 2025-07-08 RX ADMIN — CHLORHEXIDINE GLUCONATE 15 ML: 1.2 SOLUTION ORAL at 06:15

## 2025-07-08 RX ADMIN — MUPIROCIN 1 APPLICATION: 20 OINTMENT TOPICAL at 06:15

## 2025-07-08 RX ADMIN — METOPROLOL SUCCINATE 50 MG: 50 TABLET, EXTENDED RELEASE ORAL at 12:20

## 2025-07-08 RX ADMIN — SODIUM CHLORIDE: 9 INJECTION, SOLUTION INTRAVENOUS at 08:00

## 2025-07-08 RX ADMIN — Medication 100 MCG: at 08:18

## 2025-07-08 RX ADMIN — CYANOCOBALAMIN TAB 500 MCG 1000 MCG: 500 TAB at 12:20

## 2025-07-08 RX ADMIN — FUROSEMIDE 40 MG: 10 INJECTION, SOLUTION INTRAMUSCULAR; INTRAVENOUS at 09:53

## 2025-07-08 RX ADMIN — PROPOFOL 70 MG: 10 INJECTION, EMULSION INTRAVENOUS at 07:46

## 2025-07-08 RX ADMIN — PANTOPRAZOLE SODIUM 40 MG: 40 TABLET, DELAYED RELEASE ORAL at 12:20

## 2025-07-08 RX ADMIN — LIDOCAINE HYDROCHLORIDE 5 ML: 10 INJECTION, SOLUTION EPIDURAL; INFILTRATION; INTRACAUDAL; PERINEURAL at 07:46

## 2025-07-08 RX ADMIN — Medication 200 MCG: at 08:20

## 2025-07-08 RX ADMIN — MUPIROCIN 1 APPLICATION: 20 OINTMENT TOPICAL at 12:20

## 2025-07-08 RX ADMIN — CHLORHEXIDINE GLUCONATE 15 ML: 1.2 SOLUTION ORAL at 21:46

## 2025-07-08 RX ADMIN — LIDOCAINE HYDROCHLORIDE 0.5 ML: 10 INJECTION, SOLUTION EPIDURAL; INFILTRATION; INTRACAUDAL; PERINEURAL at 07:43

## 2025-07-08 RX ADMIN — GABAPENTIN 300 MG: 300 CAPSULE ORAL at 21:46

## 2025-07-08 RX ADMIN — MUPIROCIN 1 APPLICATION: 20 OINTMENT TOPICAL at 21:46

## 2025-07-08 RX ADMIN — HYDRALAZINE HYDROCHLORIDE 10 MG: 20 INJECTION INTRAMUSCULAR; INTRAVENOUS at 22:41

## 2025-07-08 RX ADMIN — MEMANTINE 10 MG: 10 TABLET ORAL at 21:46

## 2025-07-08 RX ADMIN — Medication 200 MCG: at 08:31

## 2025-07-08 RX ADMIN — ATORVASTATIN CALCIUM 40 MG: 40 TABLET, FILM COATED ORAL at 21:46

## 2025-07-08 RX ADMIN — HEPARIN SODIUM 13000 UNITS: 1000 INJECTION, SOLUTION INTRAVENOUS; SUBCUTANEOUS at 08:25

## 2025-07-08 RX ADMIN — SODIUM CHLORIDE, SODIUM LACTATE, POTASSIUM CHLORIDE, AND CALCIUM CHLORIDE: .6; .31; .03; .02 INJECTION, SOLUTION INTRAVENOUS at 07:35

## 2025-07-08 RX ADMIN — CARBAMAZEPINE 400 MG: 200 TABLET, EXTENDED RELEASE ORAL at 13:32

## 2025-07-08 RX ADMIN — CEFAZOLIN SODIUM 2000 MG: 2 SOLUTION INTRAVENOUS at 07:42

## 2025-07-08 RX ADMIN — CLOPIDOGREL BISULFATE 75 MG: 75 TABLET, FILM COATED ORAL at 12:20

## 2025-07-08 RX ADMIN — CARBAMAZEPINE 400 MG: 200 TABLET, EXTENDED RELEASE ORAL at 07:09

## 2025-07-08 RX ADMIN — PROTAMINE SULFATE 80 MG: 10 INJECTION, SOLUTION INTRAVENOUS at 08:36

## 2025-07-08 RX ADMIN — NICARDIPINE HYDROCHLORIDE 2.5 MG/HR: 25 INJECTION, SOLUTION INTRAVENOUS at 12:58

## 2025-07-08 RX ADMIN — NICARDIPINE HYDROCHLORIDE 200 MCG: 2.5 INJECTION, SOLUTION INTRAVENOUS at 08:50

## 2025-07-08 NOTE — ANESTHESIA POSTPROCEDURE EVALUATION
Post-Op Assessment Note    Last Filed PACU Vitals:  Vitals Value Taken Time   Temp 97.9 °F (36.6 °C) 07/08/25 09:30   Pulse 64 07/08/25 10:54   BP 93/51 07/08/25 10:45   Resp 12 07/08/25 10:54   SpO2 96 % 07/08/25 10:54   Vitals shown include unfiled device data.    Modified Sumaya:     Vitals Value Taken Time   Activity 2 07/08/25 09:30   Respiration 2 07/08/25 09:30   Circulation 2 07/08/25 09:30   Consciousness 2 07/08/25 09:30   Oxygen Saturation 1 07/08/25 09:30     Modified Sumaya Score: 9

## 2025-07-08 NOTE — ANESTHESIA PROCEDURE NOTES
Procedure Performed: KATIE Anesthesia  Start Time:  7/8/2025 8:10 AM        Preanesthesia Checklist    Patient identified, IV assessed, risks and benefits discussed, monitors and equipment assessed, procedure being performed at surgeon's request and anesthesia consent obtained.      Procedure    Diagnostic Indications for KATIE:  assessment of ascending aorta, assessment of surgical repair, hemodynamic monitoring and suspected pericardial effusion. Type of KATIE: interventional KATIE with real time guidance of intracardiac procedure. Images Saved: ultrasound permanent image saved. Physician Requesting Echo: Aris Faulkner DO.  Location performed: OR. Intubated.  Heart visualized. Insertion of KATIE Probe:  Easy. Probe Type:  Epiaortic and multiplane. Modalities:  Color flow mapping, 3D, continuous wave Doppler and pulse wave Doppler.      Echocardiographic and Doppler Measurements    PREPROCEDURE    LEFT VENTRICLE:  Systolic Function: normal. Ejection Fraction: 50-55%. Cavity size: normal.   Regional Wall Motion Abnormalities: none.    RIGHT VENTRICLE:  Systolic Function: normal.  Cavity size normal. No hypertrophy              AORTIC VALVE:  Leaflets: trileaflet. Leaflet motions restricted. Stenosis: severe. Mean Gradient: 40 mmHg. Peak Gradient: 68 mmHg.   Regurgitation: trace.      MITRAL VALVE:  Leaflets: calcified. Leaflet Motions: normal. Regurgitation: mild.   Stenosis: none.       TRICUSPID VALVE:  Leaflets: normal. Leaflet Motions: normal. Stenosis: none. Regurgitation: mild.      PULMONIC VALVE:  Leaflets: normal. Regurgitation: none. Stenosis: none.        ASCENDING AORTA:  Size:  normal.  Dissection not present.      AORTIC ARCH:  Size:  normal.  dissection not present. Grade 3: atheroma protruding < 0.5 cm into lumen.    DESCENDING AORTA:  Size: normal.  Dissection not present. Grade 3: atheroma protruding < 0.5 cm into lumen.        RIGHT ATRIUM:  Size:  normal. Spontaneous echo contrast.    LEFT ATRIUM:  Size:  normal. Spontaneous echo contrast.    LEFT ATRIAL APPENDAGE:  Size: normal. Spontaneous echo contrast.         ATRIAL SEPTUM:  Intra-atrial septal morphology: normal.          VENTRICULAR SEPTUM:  Intra-ventricular septum morphology: normal.             OTHER FINDINGS:  Pericardium:  normal. Pleural Effusion:  none.        POSTPROCEDURE    LEFT VENTRICLE: Unchanged .         RIGHT VENTRICLE: Unchanged .           AORTIC VALVE:   Leaflets: bioprosthetic. Stenosis: none. Mean Gradient: 4 mmHg. Regurgitation: Trace to mild and trace.  Valve Size: 29 mm.    MITRAL VALVE: Unchanged .         TRICUSPID VALVE: Unchanged .        PULMONIC VALVE: Unchanged             ATRIA: Unchanged .          AORTA: Unchanged .        REMOVAL:  Probe Removal: atraumatic.

## 2025-07-08 NOTE — ANESTHESIA POSTPROCEDURE EVALUATION
Post-Op Assessment Note    CV Status:  Stable    Pain management: adequate       Mental Status:  Alert and awake   Hydration Status:  Stable   PONV Controlled:  Controlled   Airway Patency:  Patent     Post Op Vitals Reviewed: Yes    No anethesia notable event occurred.    Staff: CRNA           Last Filed PACU Vitals:  Vitals Value Taken Time   Temp 97.4    Pulse 76 07/08/25 09:01   /61    Resp 18    SpO2 96 % 07/08/25 09:01   Vitals shown include unfiled device data.

## 2025-07-08 NOTE — ASSESSMENT & PLAN NOTE
Progressing mild to moderate  At risk secondary to hx of CVA  Needs assistance with IADLs  Family and friends do daily checks  Vance Cog 4.0 (11/3/24)  CT head (2/14/25) chronic small vessel ischemic changes, old lacunar infarcts at the right thalamus and right cerebellar hemisphere  On aricept and namenda

## 2025-07-08 NOTE — INTERVAL H&P NOTE
H&P reviewed. After examining the patient I find no changes in the patients condition since the H&P had been written.    Vitals:    07/08/25 0500   BP: (!) 174/94   Pulse: 77   Resp: 20   Temp: (!) 96.4 °F (35.8 °C)   SpO2: 96%

## 2025-07-08 NOTE — ANESTHESIA PROCEDURE NOTES
"Arterial Line Insertion    Performed by: Manuela Raya MD  Authorized by: Manuela Raya MD  Consent: Verbal consent obtained. Written consent obtained  Risks and benefits: risks, benefits and alternatives were discussed  Consent given by: patient  Patient understanding: patient states understanding of the procedure being performed  Patient consent: the patient's understanding of the procedure matches consent given  Required items: required blood products, implants, devices, and special equipment available  Patient identity confirmed: arm band and verbally with patient  Time out: Immediately prior to procedure a \"time out\" was called to verify the correct patient, procedure, equipment, support staff and site/side marked as required.  Preparation: Patient was prepped and draped in the usual sterile fashion.  Indications: multiple ABGs and hemodynamic monitoring  Orientation:  Left  Location: radial arterylidocaine (PF) (XYLOCAINE-MPF) 1 % - Infiltration   0.5 mL - 7/8/2025 7:43:00 AM  Sedation:  Patient sedated: no    Procedure Details:      Line Type: Arterial Line  Needle gauge: 20  Placement technique:  Ultrasound guided  Ultrasound image availability:  Vascular entry visualized in real time  Number of attempts: 1    Post-procedure:  Post-procedure: dressing applied  Waveform: good waveform  Post-procedure CNS: unchanged  Patient tolerance: patient tolerated the procedure well with no immediate complications and Patient tolerated the procedure well with no immediate complications          "

## 2025-07-08 NOTE — CONSULTS
Consultation - Geriatric Medicine   Name: Jon Morton 75 y.o. male I MRN: [id number]  Unit/Bed#: [facility]-309-01 I Date of Admission: 7/8/2025   Date of Service: 7/8/2025 I Hospital Day: 0   Inpatient consult to Gerontology for [facility]  Consult performed by: GAGE Centeno  Consult ordered by: Kunal Salvador PA-C        Physician Requesting Evaluation: Aris Faulkner DO   Reason for Evaluation / Principal Problem: aortic stenosis    Assessment & Plan  Severe aortic stenosis  S/p TAVR  S/P TAVR (transcatheter aortic valve replacement)  Echo pending  Dementia (HCC)  Progressing mild to moderate  At risk secondary to hx of CVA  Needs assistance with IADLs  Family and friends do daily checks  Vance Cog 4.0 (11/3/24)  CT head (2/14/25) chronic small vessel ischemic changes, old lacunar infarcts at the right thalamus and right cerebellar hemisphere  On aricept and namenda     Seizures (HCC)  Follows with neurology as outpatient   On carbamazepine   At risk for delirium  Oriented x 2-3, no signs of delirium  At HIGH risk of delirium secondary to hospitalization in the setting of underlying dementia, hx of hallucinations and encephalopathy with prior admission in February 2025 (UTI, hematuria)  Maintain delirium precautions:  Provide frequent redirection, reorientation, distraction techniques  Avoid deliriogenic medications such as tramadol, benzodiazepines, anticholinergics,  Benadryl  Treat pain, See geriatric pain protocol  Monitor for constipation and urinary retention  Encourage early and frequent moblization, OOB  Encourage Hydration/ Nutrition  Implement sleep hygiene, limit night time interuptions, group activities    Malignant neoplasm of lateral wall of urinary bladder (HCC)  Seen by urology 3/3/25  Planned for TURBT on August 26  Frailty  Clinical Frail Scale: 5- Mildly Frail  More evident slowing, needs help high order IADLs (transport, bills, medications)  Progressively impairs shopping and  "walking outside alone, meal prep and housework   Lives alone, has daily checks from neighbors or niece  Medications in pill organizer/boxes  He reports he eats TV dinners  Continue supportive care  Ambulatory dysfunction  At risk for falls secondary to age, gait instability, frailty, medications, visual impairment  Fall precautions  Keep physically active  Recommend fall prevention/ balance training such as Matter of Balance or Glenn Chi or yoga  Fall prevention handout given from cdc.gov/steadi       History of Present Illness   Hx and PE limited by: NA  HPI: Jon Morton is a 75 y.o. year old male who presents with aortic stenosis. He reports dyspnea and presyncope. He is admitted for transcatheter aortic valve replacement.    He has dementia, HTN, seizure disorder, hx of bladder cancer, pulmonary nodules.    Prior to arrival he lives at home alone. He needs assistance with IADLs. He has daily check from family and friends. He is hard of hearing. He denies insomnia, nocturia, constipation. When asked what hobbies he has, what he likes to do he states \" a little of this and that\" He reports his family provides TV dinners that he is able to eat.     He is oob in recliner chair, he is oriented x 2, calm and cooperative.     Review of Systems   Constitutional:  Negative for unexpected weight change.   HENT:  Positive for hearing loss.    Eyes:  Negative for visual disturbance.   Respiratory:  Negative for apnea.    Gastrointestinal:  Negative for constipation.   Genitourinary:  Negative for difficulty urinating.   Musculoskeletal:  Positive for gait problem.   Skin:  Negative for color change.   Neurological:  Positive for weakness.   Psychiatric/Behavioral:  Negative for sleep disturbance.            Historical Information   Past Medical History[1]  Past Surgical History[2]  Social History[3]  E-Cigarette/Vaping    E-Cigarette Use Never User      E-Cigarette/Vaping Substances    Nicotine No     THC No     CBD No  "    Flavoring No     Other No     Unknown No      Family History[4]  Social History[5]    Current Facility-Administered Medications:     acetaminophen (TYLENOL) rectal suppository 650 mg, Q4H PRN    acetaminophen (TYLENOL) tablet 650 mg, Q4H PRN    aspirin chewable tablet 81 mg, Daily    atorvastatin (LIPITOR) tablet 40 mg, HS    bisacodyl (DULCOLAX) rectal suppository 10 mg, Daily PRN    calcium gluconate 2 g in sodium chloride 0.9% 100 mL (premix), Once PRN    [START ON 7/9/2025] carBAMazepine (TEGretol XR) 12 hr tablet 400 mg, BID    ceFAZolin (ANCEF) IVPB (premix in dextrose) 2,000 mg 50 mL, Q8H    chlorhexidine (PERIDEX) 0.12 % oral rinse 15 mL, BID    clopidogrel (PLAVIX) tablet 75 mg, Daily    cyanocobalamin (VITAMIN B-12) tablet 1,000 mcg, Daily    donepezil (ARICEPT) tablet 10 mg, HS    [START ON 7/9/2025] fondaparinux (ARIXTRA) subcutaneous injection 2.5 mg, Daily    gabapentin (NEURONTIN) capsule 300 mg, HS    hydrALAZINE (APRESOLINE) injection 10 mg, Q6H PRN    losartan (COZAAR) tablet 100 mg, Daily    memantine (NAMENDA) tablet 10 mg, BID    metoprolol succinate (TOPROL-XL) 24 hr tablet 50 mg, BID    mupirocin (BACTROBAN) 2 % nasal ointment 1 Application, Q12H NEIL    niCARdipine (CARDENE) 25 mg (STANDARD CONCENTRATION) in sodium chloride 0.9% 250 mL, Titrated, Last Rate: Stopped (07/08/25 1400)    ondansetron (ZOFRAN) injection 4 mg, Q6H PRN    pantoprazole (PROTONIX) EC tablet 40 mg, Daily Before Breakfast    polyethylene glycol (MIRALAX) packet 17 g, Daily  Patient has no known allergies.    Meds/Allergies   Home medication review  Asa 81 mg po daily  Atorvastatin 40 mg po Q evening  Tergretol 400 mg po Q12  Vitamin B 12 1000 mcg po daily  Donepezil 10 mg po QHS  Gabapentin 300 mg po QHS  Losartan 100 mg po Q am  Memantine 10 mg po BID  Metoprolol succinate 50 mg po Q12      Objective :  Temp:  [96.4 °F (35.8 °C)-97.9 °F (36.6 °C)] 97.5 °F (36.4 °C)  HR:  [65-77] 76  BP: ()/(51-94) 96/51  Resp:   [12-26] 14  SpO2:  [94 %-98 %] 97 %  O2 Device: Nasal cannula  Nasal Cannula O2 Flow Rate (L/min):  [2 L/min-3 L/min] 3 L/min    Physical Exam  Vitals and nursing note reviewed.   HENT:      Head: Normocephalic.      Nose: No congestion.      Mouth/Throat:      Mouth: Mucous membranes are moist.     Eyes:      General:         Right eye: No discharge.         Left eye: No discharge.       Cardiovascular:      Rate and Rhythm: Normal rate and regular rhythm.      Pulses: Normal pulses.   Pulmonary:      Effort: Pulmonary effort is normal.      Breath sounds: Normal breath sounds.   Abdominal:      General: Abdomen is flat.      Palpations: Abdomen is soft.     Musculoskeletal:         General: Normal range of motion.      Cervical back: Normal range of motion.     Skin:     General: Skin is warm and dry.     Neurological:      Mental Status: He is alert and oriented to person, place, and time. Mental status is at baseline.     Psychiatric:         Mood and Affect: Mood normal.           Lab Results: I have reviewed the following results:CBC/BMP:   .     07/08/25  0849 07/08/25  0905   HGB 10.9* 12.6   HCT 32* 37.8   PLT  --  154   SODIUM  --  137   K  --  4.1   CL  --  106   CO2 23 25   BUN  --  25   CREATININE  --  0.96   GLUC  --  99   CAIONIZED 1.18  --         Imaging Results Review: I reviewed radiology reports from this admission including: CT head and procedure reports.  Other Study Results Review: EKG was reviewed.     Therapies:   Basic Mobility Inpatient Raw Score: 12  -[facility] Goal: 4: Move to chair/commode  -[facility] Achieved: 2: Bed activities/Dependent transfer      VTE Prophylaxis: Sequential compression device (Venodyne)     Code Status: Level 1 - Full Code  Advance Directive and Living Will:      Power of : Yes      Family and Social Support:   No data recorded    Goals of Care: full code    I have spent a total time of 90 minutes in caring for this patient on the day of the visit/encounter  including Diagnostic results, Prognosis, Risks and benefits of tx options, Instructions for management, Patient and family education, Importance of tx compliance, Risk factor reductions, Impressions, Counseling / Coordination of care, Documenting in the medical record, Reviewing/placing orders in the medical record (including tests, medications, and/or procedures), Obtaining or reviewing history  , and Communicating with other healthcare professionals .          [1]   Past Medical History:  Diagnosis Date    Aortic stenosis     Bladder cancer (HCC)     Dementia (HCC)     Hx of bladder cancer     Hypercholesterolemia     Hyperlipidemia     Hypertension     Memory difficulties     Neuropathy     Seizures (HCC)     Stroke (HCC)    [2]   Past Surgical History:  Procedure Laterality Date    BACK SURGERY      CARDIAC CATHETERIZATION N/A 2025    Procedure: Cardiac RHC/LHC;  Surgeon: Benton Finch MD;  Location: [facility];  Service: Cardiology    DENTAL SURGERY     [3]   Social History  Tobacco Use    Smoking status: Former     Current packs/day: 0.00     Types: Cigarettes     Quit date:      Years since quittin.     Passive exposure: Past    Smokeless tobacco: Never   Vaping Use    Vaping status: Never Used   Substance and Sexual Activity    Alcohol use: Not Currently    Drug use: Never    Sexual activity: Not Currently   [4]   Family History  Problem Relation Name Age of Onset    Heart attack Mother      Heart disease Father      No Known Problems Sister      No Known Problems Brother      Post-traumatic stress disorder Niece Trupti     Anxiety disorder Niece Trupti     PKU Niece Trupti    [5]   Social History  Tobacco Use    Smoking status: Former     Current packs/day: 0.00     Types: Cigarettes     Quit date:      Years since quittin.5     Passive exposure: Past    Smokeless tobacco: Never   Vaping Use    Vaping status: Never Used   Substance and Sexual Activity    Alcohol use: Not  Currently    Drug use: Never    Sexual activity: Not Currently

## 2025-07-08 NOTE — ASSESSMENT & PLAN NOTE
Oriented x 2-3, no signs of delirium  At HIGH risk of delirium secondary to hospitalization in the setting of underlying dementia, hx of hallucinations and encephalopathy with prior admission in February 2025 (UTI, hematuria)  Maintain delirium precautions:  Provide frequent redirection, reorientation, distraction techniques  Avoid deliriogenic medications such as tramadol, benzodiazepines, anticholinergics,  Benadryl  Treat pain, See geriatric pain protocol  Monitor for constipation and urinary retention  Encourage early and frequent moblization, OOB  Encourage Hydration/ Nutrition  Implement sleep hygiene, limit night time interuptions, group activities

## 2025-07-08 NOTE — OP NOTE
OPERATIVE REPORT  PATIENT NAME: Jon Morton    :  1950  MRN: [id number]  Pt Location: [facility] 02    SURGERY DATE: 2025    SURGEON: Aris Faulkner DO     ASSISTANT: Kunal Salvador PA-C    CO-SURGEON: Dr. Elmo Rangel     PREOPERATIVE DIAGNOSIS Symptomatic severe aortic stenosis.     POSTOPERATIVE DIAGNOSIS Symptomatic severe aortic stenosis.     NYHA CLASS: 3    CCS CLASS: 2    PROCEDURE Transcatheter aortic valve replacement with a 29 mm Muñoz MAURICE 3 Ultra Resilia bioprosthetic valve via a percutaneous left transfemoral approach.     ANESTHESIA Dr. Manuela Raya, general endotracheal anesthesia with transesophageal echocardiogram guidance.     CARDIOPULMONARY BYPASS TIME 0.    PACKS/TUBES/DRAINS None.     ESTIMATED BLOOD LOSS: 15 mL    OPERATIVE TECHNIQUE The patient was taken to the operating room and placed supine on the operating table. Following the satisfactory induction of general anesthesia and placement of monitoring lines, the patient was prepped and draped in the usual sterile fashion. A time-out procedure was performed.     The left common femoral artery was accessed percutaneously using Seldinger technique and fluoroscopy.  Two (2) Perclose sutures were deployed in the standard fashion. The left common femoral vein was accessed in a similar fashion and was cannulated with a 6 Malawian sheath. The femoral artery was cannulated with a 7 Malawian sheath. A pigtail catheter was inserted and advanced through the left common femoral artery sheath into the right coronary cusp. Using a series of injections, the angle of deployment was determined. Through the left common femoral vein sheath, a balloon tip temporary pacing catheter was inserted into the right ventricle and its capture was confirmed.     The patient was systemically heparinized. The left common femoral artery sheath was then removed over an extra-stiff wire and the delivery sheath was inserted through the left common  femoral artery and advanced into the aorta. The aortic valve was crossed with a wire.    A 29 mm MAURICE 3 Ultra Resilia valve was then advanced through the sheath into the aorta and positioned onto the deployment balloon in the aorta and then advanced around the aortic arch and through the annulus of the aortic valve to the appropriate level. At this point, the catheter was desheathed in the standard fashion. The valve was positioned appropriately using a combination of fluoroscopy and transesophageal echocardiogram guidance. During an episode of rapid pacing, balloon deployment of the 29 mm MAURICE 3 Ultra Resilia valve was performed. Following deployment, the position of the valve was confirmed by fluoroscopy and echocardiography and its position appeared appropriate with trace perivalvular leak.     The valve delivery system was subsequently removed and the sheath was removed from the left common femoral artery while the Perclose sutures were secured and direct pressure was held. Protamine was administered with normalization of the ACT. Pressure was released from the left groin and there was no active bleeding and no evidence of hematoma development. The common femoral vein sheath was removed from the left and pressure was held.     Sponge, needle, and instrument counts were reported as correct by the nursing staff. As the attending surgeon, I was present and scrubbed for all critical portions of this procedure. Final transesophageal echocardiogram demonstrated a well-positioned bioprosthetic transcatheter aortic valve with normal function and trace perivalvular leak.     The case required the expertise of a cardiac surgeon as well as an interventional cardiologist to act as co-surgeons per CMS requirements.      SIGNATURE: Aris Faulkner DO  DATE: July 8, 2025  TIME: 8:39 AM

## 2025-07-08 NOTE — ANESTHESIA PROCEDURE NOTES
"Central Line Insertion    Performed by: Cayla Ulloa CRNA  Authorized by: Manuela Raya MD    Date/Time: 7/8/2025 8:00 AM  Catheter Type:  triple lumen  Consent: Verbal consent obtained. Written consent obtained  Risks and benefits: risks, benefits and alternatives were discussed  Consent given by: patient  Patient understanding: patient states understanding of the procedure being performed  Patient consent: the patient's understanding of the procedure matches consent given  Required items: required blood products, implants, devices, and special equipment available  Patient identity confirmed: arm band, provided demographic data and hospital-assigned identification number  Time out: Immediately prior to procedure a \"time out\" was called to verify the correct patient, procedure, equipment, support staff and site/side marked as required.  Indications: vascular access and central pressure monitoring  Catheter size: 7 Fr  Patient position: Trendelenburg  Assessment: blood return through all ports and free fluid flow  Preparation: skin prepped with 2% chlorhexidine  Skin prep agent dried: skin prep agent completely dried prior to procedure  Sterile barriers: all five maximum sterile barriers used - cap, mask, sterile gown, sterile gloves, and large sterile sheet  Hand hygiene: hand hygiene performed prior to central venous catheter insertion  sterile gel and probe cover used in ultrasound-guided central venous catheter insertionultrasound permanent image saved  Vessel of Catheter Tip End: svc  Number of attempts: 1  Successful placement: yes  Post-procedure: line sutured, dressing applied and chlorhexidine patch applied  Patient tolerance: Patient tolerated the procedure well with no immediate complications and patient tolerated the procedure well with no immediate complications        "

## 2025-07-09 ENCOUNTER — TELEPHONE (OUTPATIENT)
Age: 75
End: 2025-07-09

## 2025-07-09 ENCOUNTER — APPOINTMENT (INPATIENT)
Dept: RADIOLOGY | Facility: HOSPITAL | Age: 75
DRG: 267 | End: 2025-07-09
Payer: MEDICARE

## 2025-07-09 ENCOUNTER — TELEPHONE (OUTPATIENT)
Dept: CARDIAC SURGERY | Facility: CLINIC | Age: 75
End: 2025-07-09

## 2025-07-09 PROBLEM — R26.2 AMBULATORY DYSFUNCTION: Status: ACTIVE | Noted: 2025-07-09

## 2025-07-09 PROBLEM — R54 FRAILTY: Status: ACTIVE | Noted: 2025-07-09

## 2025-07-09 LAB
ANION GAP SERPL CALCULATED.3IONS-SCNC: 7 MMOL/L (ref 4–13)
ATRIAL RATE: 82 BPM
BUN SERPL-MCNC: 25 MG/DL (ref 5–25)
CALCIUM SERPL-MCNC: 8.5 MG/DL (ref 8.4–10.2)
CHLORIDE SERPL-SCNC: 103 MMOL/L (ref 96–108)
CO2 SERPL-SCNC: 29 MMOL/L (ref 21–32)
CREAT SERPL-MCNC: 0.99 MG/DL (ref 0.6–1.3)
ERYTHROCYTE [DISTWIDTH] IN BLOOD BY AUTOMATED COUNT: 13.6 % (ref 11.6–15.1)
GFR SERPL CREATININE-BSD FRML MDRD: 73 ML/MIN/1.73SQ M
GLUCOSE SERPL-MCNC: 102 MG/DL (ref 65–140)
HCT VFR BLD AUTO: 37.9 % (ref 36.5–49.3)
HGB BLD-MCNC: 12.5 G/DL (ref 12–17)
MAGNESIUM SERPL-MCNC: 1.8 MG/DL (ref 1.9–2.7)
MCH RBC QN AUTO: 31.3 PG (ref 26.8–34.3)
MCHC RBC AUTO-ENTMCNC: 33 G/DL (ref 31.4–37.4)
MCV RBC AUTO: 95 FL (ref 82–98)
P AXIS: 80 DEGREES
PLATELET # BLD AUTO: 153 THOUSANDS/UL (ref 149–390)
PMV BLD AUTO: 9.1 FL (ref 8.9–12.7)
POTASSIUM SERPL-SCNC: 4.1 MMOL/L (ref 3.5–5.3)
PR INTERVAL: 140 MS
QRS AXIS: 76 DEGREES
QRSD INTERVAL: 90 MS
QT INTERVAL: 394 MS
QTC INTERVAL: 460 MS
RBC # BLD AUTO: 4 MILLION/UL (ref 3.88–5.62)
SODIUM SERPL-SCNC: 139 MMOL/L (ref 135–147)
T WAVE AXIS: 102 DEGREES
VENTRICULAR RATE: 82 BPM
WBC # BLD AUTO: 8.17 THOUSAND/UL (ref 4.31–10.16)

## 2025-07-09 PROCEDURE — 99233 SBSQ HOSP IP/OBS HIGH 50: CPT | Performed by: STUDENT IN AN ORGANIZED HEALTH CARE EDUCATION/TRAINING PROGRAM

## 2025-07-09 PROCEDURE — 80048 BASIC METABOLIC PNL TOTAL CA: CPT | Performed by: PHYSICIAN ASSISTANT

## 2025-07-09 PROCEDURE — 71045 X-RAY EXAM CHEST 1 VIEW: CPT

## 2025-07-09 PROCEDURE — 85027 COMPLETE CBC AUTOMATED: CPT | Performed by: PHYSICIAN ASSISTANT

## 2025-07-09 PROCEDURE — 99223 1ST HOSP IP/OBS HIGH 75: CPT | Performed by: INTERNAL MEDICINE

## 2025-07-09 PROCEDURE — 97163 PT EVAL HIGH COMPLEX 45 MIN: CPT

## 2025-07-09 PROCEDURE — 93005 ELECTROCARDIOGRAM TRACING: CPT

## 2025-07-09 PROCEDURE — 93010 ELECTROCARDIOGRAM REPORT: CPT | Performed by: STUDENT IN AN ORGANIZED HEALTH CARE EDUCATION/TRAINING PROGRAM

## 2025-07-09 PROCEDURE — 97167 OT EVAL HIGH COMPLEX 60 MIN: CPT

## 2025-07-09 PROCEDURE — 83735 ASSAY OF MAGNESIUM: CPT | Performed by: PHYSICIAN ASSISTANT

## 2025-07-09 RX ORDER — ACETAMINOPHEN 325 MG/1
650 TABLET ORAL EVERY 6 HOURS PRN
Status: CANCELLED
Start: 2025-07-09

## 2025-07-09 RX ORDER — CLOPIDOGREL BISULFATE 75 MG/1
75 TABLET ORAL DAILY
Qty: 90 TABLET | Refills: 0 | Status: CANCELLED | OUTPATIENT
Start: 2025-07-09

## 2025-07-09 RX ORDER — METOPROLOL SUCCINATE 25 MG/1
25 TABLET, EXTENDED RELEASE ORAL 2 TIMES DAILY
Status: DISCONTINUED | OUTPATIENT
Start: 2025-07-09 | End: 2025-07-10 | Stop reason: HOSPADM

## 2025-07-09 RX ORDER — LOSARTAN POTASSIUM 25 MG/1
25 TABLET ORAL DAILY
Status: DISCONTINUED | OUTPATIENT
Start: 2025-07-10 | End: 2025-07-10 | Stop reason: HOSPADM

## 2025-07-09 RX ADMIN — CEFAZOLIN SODIUM 2000 MG: 2 SOLUTION INTRAVENOUS at 08:59

## 2025-07-09 RX ADMIN — CARBAMAZEPINE 400 MG: 200 TABLET, EXTENDED RELEASE ORAL at 17:20

## 2025-07-09 RX ADMIN — MEMANTINE 10 MG: 10 TABLET ORAL at 08:59

## 2025-07-09 RX ADMIN — MEMANTINE 10 MG: 10 TABLET ORAL at 21:40

## 2025-07-09 RX ADMIN — PANTOPRAZOLE SODIUM 40 MG: 40 TABLET, DELAYED RELEASE ORAL at 06:22

## 2025-07-09 RX ADMIN — CYANOCOBALAMIN TAB 500 MCG 1000 MCG: 500 TAB at 08:59

## 2025-07-09 RX ADMIN — FONDAPARINUX SODIUM 2.5 MG: 2.5 INJECTION, SOLUTION SUBCUTANEOUS at 08:59

## 2025-07-09 RX ADMIN — CHLORHEXIDINE GLUCONATE 15 ML: 1.2 SOLUTION ORAL at 08:59

## 2025-07-09 RX ADMIN — ASPIRIN 81 MG CHEWABLE TABLET 81 MG: 81 TABLET CHEWABLE at 08:59

## 2025-07-09 RX ADMIN — METOPROLOL SUCCINATE 25 MG: 25 TABLET, EXTENDED RELEASE ORAL at 21:40

## 2025-07-09 RX ADMIN — GABAPENTIN 300 MG: 300 CAPSULE ORAL at 21:40

## 2025-07-09 RX ADMIN — CHLORHEXIDINE GLUCONATE 15 ML: 1.2 SOLUTION ORAL at 21:40

## 2025-07-09 RX ADMIN — MUPIROCIN 1 APPLICATION: 20 OINTMENT TOPICAL at 08:59

## 2025-07-09 RX ADMIN — SODIUM CHLORIDE 250 ML: 0.9 INJECTION, SOLUTION INTRAVENOUS at 11:20

## 2025-07-09 RX ADMIN — CEFAZOLIN SODIUM 2000 MG: 2 SOLUTION INTRAVENOUS at 01:16

## 2025-07-09 RX ADMIN — CARBAMAZEPINE 400 MG: 200 TABLET, EXTENDED RELEASE ORAL at 08:59

## 2025-07-09 RX ADMIN — CLOPIDOGREL BISULFATE 75 MG: 75 TABLET, FILM COATED ORAL at 08:59

## 2025-07-09 RX ADMIN — POLYETHYLENE GLYCOL 3350 17 G: 17 POWDER, FOR SOLUTION ORAL at 08:58

## 2025-07-09 RX ADMIN — MUPIROCIN 1 APPLICATION: 20 OINTMENT TOPICAL at 21:40

## 2025-07-09 RX ADMIN — ATORVASTATIN CALCIUM 40 MG: 40 TABLET, FILM COATED ORAL at 21:40

## 2025-07-09 RX ADMIN — DONEPEZIL HYDROCHLORIDE 10 MG: 10 TABLET ORAL at 21:40

## 2025-07-09 NOTE — PLAN OF CARE
"  Problem: OCCUPATIONAL THERAPY ADULT  Goal: Performs self-care activities at highest level of function for planned discharge setting.  See evaluation for individualized goals.  Description: Treatment Interventions: ADL retraining, Functional transfer training, Endurance training, Cognitive reorientation, Patient/family training, Energy conservation, Activityengagement          See flowsheet documentation for full assessment, interventions and recommendations.   Note: Limitation: Decreased ADL status, Decreased Safe judgement during ADL, Decreased cognition, Decreased endurance  Prognosis: Good  Assessment: 75 year old pt seen today for an OT evaluation following admission to [facility] s/p TAVR 2/2 severe AS on 7/8 with present symptoms significant for pain, fatigue, weakness, decreased ADL status, decreased functional mobility. Pt has a past medical history of Aortic stenosis, Bladder cancer ([facility]), Dementia ([facility]), bladder cancer, Hypercholesterolemia, Hyperlipidemia, Hypertension, Memory difficulties, Neuropathy, Seizures ([facility]), and Stroke ([facility]).  Pt reports living alone in 1 level apartment w/ \"couple steps to enter\"; walk-in shower w/ grab bars; standard toilet w/ commode over it. PTA, pt reports IND w/ ADLs and receives assistance w/ IADLs; retired; -; has RW and 2 canes but reports only using RW for community mobility. Pt questionable historian regarding home set-up and PLOF; will need to verify w/ CM. Pt reported no pain t/o session. Pt very pleasant and cooperative t/o session. Pt was Mod Ax1 for functional STS txfs with RW. Min Ax1 for functional mobility with RW. Pt was SUP for UB ADLs and LB ADLs. The patient's raw score on the AM-PAC Daily Activity Inpatient Short Form is 20. A raw score of greater than or equal to 19 suggests the patient may benefit from discharge to home. Please refer to the recommendation of the Occupational Therapist for safe discharge planning. Pt is " functioning below baseline level of function and will continue to benefit from skilled acute OT to promote increased independence and return to PLOF. Pt currently lives alone and scored a 4.0 on ACLS back on November 3rd, 2024 indicating requiring 24/7 supervision; continue recommendations for 24/7 supervision. At this time, current OT recommendation is Level 2 Resources pending progress and level of support.     Rehab Resource Intensity Level, OT: II (Moderate Resource Intensity) (pending progress and level of support)

## 2025-07-09 NOTE — PHYSICAL THERAPY NOTE
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)    Moderate protein-calorie malnutrition (HCC)    Bladder mass    Malignant neoplasm of lateral wall of urinary bladder (HCC)    S/P TAVR (transcatheter aortic valve replacement)    Other emphysema (HCC)    Multiple pulmonary nodules    Seizures (HCC)    At risk for delirium    Frailty    Ambulatory dysfunction       Past Medical History  Past Medical History[1]     Past Surgical History  Past Surgical History[2]     07/09/25 0832   PT Last Visit   PT Visit Date 07/09/25   Note Type   Note type Evaluation   Pain Assessment   Pain Assessment Tool 0-10   Pain Score No Pain   Restrictions/Precautions   Weight Bearing Precautions Per Order No   Other Precautions Cardiac/sternal;Cognitive;Chair Alarm;Bed Alarm;Multiple lines;Fall Risk;Telemetry  (h/o dementia)   Home Living   Type of Home Apartment   Home Layout One level;Performs ADLs on one level  (? KALLIE)   Bathroom Shower/Tub Walk-in shower   Bathroom Toilet Standard   Bathroom Equipment Grab bars in shower;Commode   Home Equipment Walker;Cane  (uses RW PRN in the community)   Additional Comments pt lives alone in one level apartment with 0 vs few KALLIE.   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  reports being independent with mobility and ADLs PTA. pt has supportive friends that assist with IADLs.   General   Family/Caregiver Present No   Cognition   Overall Cognitive Status Impaired   Arousal/Participation Responsive   Orientation Level Oriented to person;Oriented to place;Oriented to situation;Disoriented to time  (correct month only)   Memory Decreased short term memory;Decreased recall of recent events;Decreased recall of precautions   Following Commands Follows one step commands with increased time or repetition   Comments pt pleasant and cooperative   Subjective   Subjective pt agreeable to mobilize   LUE Assessment   LUE Assessment   (L 1st-3rd digit numbness)   RLE Assessment   RLE Assessment WFL  (generalized weakness)   LLE Assessment   LLE Assessment WFL  (generalized weakness)   Bed Mobility   Supine to Sit Unable to assess   Sit to Supine Unable to assess   Additional Comments pt greeted OOB in chair and left in chair at end of session   Transfers   Sit to Stand 3  Moderate assistance   Additional items Assist x 1;Armrests;Increased time required;Verbal cues   Stand to Sit 4  Minimal assistance   Additional items Assist x 1;Armrests;Increased time required;Verbal cues   Additional Comments c RW. initially unsteady upon standing.   Ambulation/Elevation   Gait pattern Inconsistent pool;Short stride;Foward flexed;Excessively slow   Gait Assistance 4  Minimal assist  (chair follow)   Additional items Assist x 1;Verbal cues   Assistive Device Rolling walker   Distance 50'x2   Stair Management Assistance Not tested   Ambulation/Elevation Additional Comments no overt LOB   Balance   Static Sitting Fair   Dynamic Sitting Fair -   Static Standing Poor +   Dynamic Standing Poor +   Ambulatory Poor +   Endurance Deficit   Endurance Deficit Yes   Endurance Deficit Description pt limited by fatigue and decreased activity tolerance   Activity Tolerance   Activity Tolerance Patient limited by fatigue    Medical Staff Made Aware DANIELA Porras   Nurse Made Aware yes-RN cleared   Assessment   Prognosis Good   Problem List Decreased strength;Decreased endurance;Impaired balance;Decreased mobility;Decreased cognition;Impaired judgement;Decreased safety awareness;Impaired sensation   Assessment Pt is an 75 y.o. male presenting [facility] on [facility]/8/25 for primary medical dx of severe aortic stenosis. Pt POD 1 TAVR. Pt  has a past medical history of Aortic stenosis, Bladder cancer ([facility]), Dementia (HCC), bladder cancer, Hypercholesterolemia, Hyperlipidemia, Hypertension, Memory difficulties, Neuropathy, Seizures (HCC), and Stroke ([facility]).  Pt presents as a high complexity evaluation due to Ongoing medical management for primary dx, Increased reliance on more restrictive AD compared to baseline, Decreased activity tolerance compared to baseline, Fall risk, Increased assistance needed from caregiver at current time, Ongoing telemetry monitoring, Cog status, Trending lab values, Continuous pulse oximetry monitoring , s/p surgical intervention. Pt currently requires mod Ax1 for transfers with RW and min A x1 for ambulating 50'x2 with RW. Pt is limited by cognition and deficits in endurance, balance, mobility, overall strength and activity tolerance limiting their ability to return home alone and be fully independent at this time. Pt would benefit from continued skilled acute care PT services to address impairments and promote functional independence. Recommend level 2 resources to improve mobility and promote PLOF. The patient's AM-PAC Basic Mobility Inpatient Short Form Raw Score is 16. A Raw score of greater than or equal to 16 suggests the patient may benefit from discharge to home. Please also refer to the recommendation of the Physical Therapist for safe discharge planning. Pt left upright in chair with chair alarm donned, call bell and personal items within reach and all needs met.   Barriers to Discharge Decreased  caregiver support   Goals   Patient Goals to get better   STG Expiration Date 07/23/25   Short Term Goal #1 In 14 days pt will complete bed mobility and transfers I to promote safety and independence. Pt will ambulate 300' at mod I to promote safe access to home and community. Pt will improve b/l LE strength by 1/2 grade to improve efficiency of transfers and ambulation.   Plan   Treatment/Interventions Functional transfer training;LE strengthening/ROM;Endurance training;Therapeutic exercise;Patient/family training;Equipment eval/education;Cognitive reorientation;Gait training;Compensatory technique education;Bed mobility;Continued evaluation;Spoke to nursing;OT   PT Frequency 4-6x/wk   Discharge Recommendation   Rehab Resource Intensity Level, PT II (Moderate Resource Intensity)  (pending progress and increased support)   Equipment Recommended Walker   Walker Package Recommended Wheeled walker   AM-PAC Basic Mobility Inpatient   Turning in Flat Bed Without Bedrails 3   Lying on Back to Sitting on Edge of Flat Bed Without Bedrails 3   Moving Bed to Chair 3   Standing Up From Chair Using Arms 2   Walk in Room 3   Climb 3-5 Stairs With Railing 2   Basic Mobility Inpatient Raw Score 16   Basic Mobility Standardized Score 38.32   [facility] Highest Level Of Mobility   -HL Goal 5: Stand one or more mins   -HLM Achieved 7: Walk 25 feet or more   Modified [place] Scale   Modified [place] Scale 3   Chelle Spence DPT         [1]   Past Medical History:  Diagnosis Date    Aortic stenosis     Bladder cancer (HCC)     Dementia (HCC)     Hx of bladder cancer     Hypercholesterolemia     Hyperlipidemia     Hypertension     Memory difficulties     Neuropathy     Seizures (HCC)     Stroke (HCC)    [2]   Past Surgical History:  Procedure Laterality Date    BACK SURGERY      CARDIAC CATHETERIZATION N/A 4/29/2025    Procedure: Cardiac RHC/LHC;  Surgeon: Benton Finch MD;  [facility]: [facility];  Service:  Cardiology    DENTAL SURGERY

## 2025-07-09 NOTE — DISCHARGE SUMMARY
Discharge Summary - Cardiac Surgery   Jon Morton 75 y.o. male MRN: [id number]  Unit/Bed#: PPHP-309-01 Encounter: [id number]    Admission Date: 7/8/2025     Discharge Date: 07/09/25    Admitting Diagnosis: Severe aortic stenosis [I35.0]    Primary Discharge Diagnosis:   Severe AS S/P L TF TAVR    Secondary Discharge Diagnosis:   bladder cancer with suspicion of recent recurrence (in need of TURBT after TAVR), hx of previous TURBT for bladder CA, dementia (at least mild), emphysema, innumerable pulm nodules, HTN, HLD, seizures/epilepsy, strokes, hx of back surgery, RUE skin lesion removal (sutures in place)     Attending: Aris Faulkner D.O.    Consulting Physician(s):   Cardiology  Gerontology    Procedures Performed:   Procedure(s):  REPLACEMENT AORTIC VALVE TRANSCATHETER (TAVR) TRANSFEMORAL W/ 29MM VALVE(ACCESS ON LEFT) KATIE  Cardiac tavr     Hospital Course:   The patient was seen in consultation prior to this admission for evaluation of severe aortic stenosis.  Risks and benefits of transfemoral transcatheter aortic valve replacement were discussed in detail, and patient was agreeable.  Routine preoperative evaluation was completed and informed consent was obtained prior to admission.    7/8: Elective admission for TF TAVR # 29 mm Muñoz S3 UR via L approach. Extubated and transferred to PACU supported with Cardene at 7.5. DP pulses dopplerable b/l. Restarted on home Lipitor 40mg, Aricept 10mg, Namenda 10mg, Neurontin 300mg, Toprol XL 50mg BID, losartan 100mg daily, Tegretol 400mg Q12H. Plan for ASA/Plavix. ECG shows SR 70s. Gerontology and cardiology consulted. CXR with chronic emphysema changes and pulm vasc congestion, give 40 IV Lasix x 1.      7/9: No issues/events overnight. Neuro/vasc intact, groin site stable. At baseline mental status with mild-mod dementia, calm and pleasant and able to answer questions appropriately. EKG SR. CXR with chronic emphysema changes. Weaned to RA this AM. Good response  to lasix IV x 1 yest, net negative 2300. Delined and griffin removed. Echo showed normal TAVR fnx/gradients, no AI/PVL, normal LVEF. Voided following griffin removal and PVR 33ml. Hypotensive diuresed over 3L in 24 hours, ordered 250 ml of NSS. Not yet stable for discharge home. PM: SBP 130s, decreased home dose of toprol xl to 25 and losartan to 25, may need up titration tomorrow, no more fluid ordered.      7/10: SR. RA. BP improved,  -140 this AM. No complaints, feels well. Neuro at baseline. Cont toprol XL at reduced dose 25mg BID, and losartan at 25mg daily for now. BP check arranged with cardiology next week. Discharge home today.      Condition at Discharge:   good     Discharge Physical Exam:    Please see the documented physical exam from this morning's progress note for details.    Discharge Data:  Results from last 7 days   Lab Units 07/09/25  0501 07/08/25 0905 07/08/25  0849   WBC Thousand/uL 8.17  --   --    HEMOGLOBIN g/dL 12.5 12.6  --    I STAT HEMOGLOBIN g/dl  --   --  10.9*   HEMATOCRIT % 37.9 37.8  --    HEMATOCRIT, ISTAT %  --   --  32*   PLATELETS Thousands/uL 153 154  --      Results from last 7 days   Lab Units 07/09/25  0501 07/08/25  0905 07/08/25  0849   POTASSIUM mmol/L 4.1 4.1  --    CHLORIDE mmol/L 103 106  --    CO2 mmol/L 29 25  --    CO2, I-STAT mmol/L  --   --  23   BUN mg/dL 25 25  --    CREATININE mg/dL 0.99 0.96  --    GLUCOSE, ISTAT mg/dl  --   --  97   CALCIUM mg/dL 8.5 8.7  --            Discharge instructions/Information to patient and family:   See after visit summary for information provided to patient and family.      Jon DOBSON Praveen was educated on restrictions regarding driving and lifting, and techniques of proper incisional care.  They were specifically counselled on signs and symptoms of an incisional infection, and advised to contact our service immediately should they develop fevers, sweats, chill, redness or drainage at the site of any  incisions.    Provisions for Follow-Up Care:  See after visit summary for information related to follow-up care and any pertinent home health orders.      Disposition:  Home    Planned Readmission:   No    Discharge Medications:  See after visit summary for reconciled discharge medications provided to patient and family.      Jon Morton was provided contact information and scheduled a follow up appointment with Aris Faulkner D.O.  Additionally, follow up appointments have been scheduled for their primary care physician and primary cardiologist.  Contact information was provided.      Jon Morton was counseled on the importance of avoiding tobacco products.  As with all patients whom have undergone open heart surgery, tobacco cessation medication was contraindicated at the time of discharge.     ACE/ARB was resumedPrescribed at discharge    Beta Blocker was resumed/Prescribed at discharge    Aspirin was resumed/Prescribed at discharge    Statin was resumed/Prescribed at discharge    Plavix was prescribed x 90 days for post TAVR antithrombotic therapy      The patient was not discharged on ongoing diuretic therapy as patient was not on diuretics preop, normal LVEF, and euvolemic    The patient was informed that following their postoperative surgical evaluation, they will be referred to outpatient cardiac rehabilitation.  They were counseled that this program is run by specialists who will help them safely strengthen their heart and prevent more heart disease.  Cardiac rehabilitation will include exercise, relaxation, stress management, and heart-healthy nutrition.  Caregivers will also check to make sure their medication regimen is working.    During this admission, the patient was questioned on their use of tobacco, alcohol, and illicit/non-prescription drug use in the  previous 24 months. Jon Morton states that they have not used any of these substances in this time frame.    I spent 30 minutes  discharging the patient. This time was spent on the day of discharge. I had direct contact with the patient on the day of discharge. Additional documentation is required if more than 30 minutes were spent on discharge.     SIGNATURE: Kunal Salvador PA-C  DATE: July 9, 2025  TIME: 7:23 AM

## 2025-07-09 NOTE — RESTORATIVE TECHNICIAN NOTE
Restorative Technician Note      Patient Name: Jon Morton     Restorative Tech Visit Date: 07/09/25  Note Type: Mobility  Patient Position Upon Consult: Bedside chair  Activity Performed: Ambulated  Assistive Device: Roller walker  Patient Position at End of Consult: Bed/Chair alarm activated; All needs within reach; Bedside chair

## 2025-07-09 NOTE — RESTORATIVE TECHNICIAN NOTE
Restorative Technician Note      Patient Name: Jon Morton     Restorative Tech Visit Date: 07/09/25  Note Type: Mobility  Patient Position Upon Consult: Bedside chair  Activity Performed: Stood  Assistive Device: Roller walker  Patient Position at End of Consult: Bed/Chair alarm activated; All needs within reach; Bedside chair

## 2025-07-09 NOTE — PLAN OF CARE
Problem: PHYSICAL THERAPY ADULT  Goal: Performs mobility at highest level of function for planned discharge setting.  See evaluation for individualized goals.  Description: Treatment/Interventions: Functional transfer training, LE strengthening/ROM, Endurance training, Therapeutic exercise, Patient/family training, Equipment eval/education, Cognitive reorientation, Gait training, Compensatory technique education, Bed mobility, Continued evaluation, Spoke to nursing, OT  Equipment Recommended: Walker       See flowsheet documentation for full assessment, interventions and recommendations.  Note: Prognosis: Good  Problem List: Decreased strength, Decreased endurance, Impaired balance, Decreased mobility, Decreased cognition, Impaired judgement, Decreased safety awareness, Impaired sensation  Assessment: Pt is an 75 y.o. male presenting to [facility] on 7/8/25 for primary medical dx of severe aortic stenosis. Pt POD 1 TAVR. Pt  has a past medical history of Aortic stenosis, Bladder cancer ([facility]), Dementia ([facility]), bladder cancer, Hypercholesterolemia, Hyperlipidemia, Hypertension, Memory difficulties, Neuropathy, Seizures ([facility]), and Stroke ([facility]).  Pt presents as a high complexity evaluation due to Ongoing medical management for primary dx, Increased reliance on more restrictive AD compared to baseline, Decreased activity tolerance compared to baseline, Fall risk, Increased assistance needed from caregiver at current time, Ongoing telemetry monitoring, Cog status, Trending lab values, Continuous pulse oximetry monitoring , s/p surgical intervention. Pt currently requires mod Ax1 for transfers with RW and min A x1 for ambulating 50'x2 with RW. Pt is limited by cognition and deficits in endurance, balance, mobility, overall strength and activity tolerance limiting their ability to return home alone and be fully independent at this time. Pt would benefit from continued skilled acute care PT services to address impairments and  promote functional independence. Recommend level 2 resources to improve mobility and promote PLOF. The patient's AM-PAC Basic Mobility Inpatient Short Form Raw Score is 16. A Raw score of greater than or equal to 16 suggests the patient may benefit from discharge to home. Please also refer to the recommendation of the Physical Therapist for safe discharge planning. Pt left upright in chair with chair alarm donned, call bell and personal items within reach and all needs met.  Barriers to Discharge: Decreased caregiver support     Rehab Resource Intensity Level, PT: II (Moderate Resource Intensity) (pending progress and increased support)    See flowsheet documentation for full assessment.

## 2025-07-09 NOTE — TELEPHONE ENCOUNTER
Spoke with Trupti, patient's niece, she will call and reschedule PCP and cardiology appt. No need to change the 8/4/25 appts. We will email AVS to Trupti after the 8/4/25 appt. All questions addressed.

## 2025-07-09 NOTE — TELEPHONE ENCOUNTER
Caller:  Trupti    Doctor: Dr. Faulkner     Reason for call:  Patient's niece calling in states that appointment that was made for [facility] post - op needs to be changed because appointments need to be made with herself not patient has she has POA     Call back#: [phone]

## 2025-07-09 NOTE — DISCHARGE INSTR - AVS FIRST PAGE
Patient needs home visiting nursing, PT, OT by [facility]        Transcatheter Aortic Valve Replacement (TAVR)    What you need to know about a TAVR:     A TAVR is a procedure to replace your aortic valve. It is done without removing your old valve. The aortic valve opens and closes to let blood flow from your heart to the rest of your body.   Your valve has been replaced with a tissue valve. The tissue has been made from the lining that surrounds the heart of a cow.    Recovering from surgery:     There may be bruising or pain in your groin, where the valve was inserted. You may take over the counter acetaminophen (Tylenol) as needed for discomfort.  Your incision is sealed with surgical glue. This will fall off after a few weeks. Do not pick this off.   Do not drive for two weeks  Do not lift over 25 pounds for two weeks.   Shower every day. Keep your incision dry. Do not apply lotions, powders, or ointments to your incision.    Blood thinners after surgery:     You have been prescribed blood thinners to help prevent blood clots. Blood clots can cause strokes, heart attacks, and death.   While taking any blood thinner, watch for bleeding and bruising. Watch for blood in your urine and bowel movements. Use a soft washcloth on your skin, and a soft toothbrush to brush your teeth. If you shave, use an electric shaver. Do not play contact sports.    Do not start or stop any other medicines unless your healthcare provider tells you to do so. (Many medicines cannot be used with blood thinners)    Take your blood thinner exactly as prescribed by your healthcare provider. Do not skip a dose or take less than prescribed. Tell your provider right away if you forget to take your blood thinner, or if you took too much.    Call your doctor if:     You develop any bleeding from the incisions in your groin.   Your leg feels numb, cool, or looks pale.   You feel dizzy or lightheaded  Your groin puncture is red, swollen, or  draining pus.  Your groin puncture looks more bruised/swollen or you have new bruising on the side of your leg.   You have nausea or are vomiting.    Antibiotic Prophylaxis:     After TAVR, you are at an increased risk for developing a valve infection with many common dental procedures. Bacteria that normally lives in your mouth can cross into your bloodstream with any dental work (even cleanings). The immune system normally kills these bacteria, but antibiotic prophylaxis provides extra protection.   Inform your dentist that you have had a TAVR  Do not schedule routine dental cleaning for six months following surgery.   Antibiotics will be prescribed by your dentist, prior to your procedure

## 2025-07-09 NOTE — UTILIZATION REVIEW
Initial Clinical Review    Elective Inpatient surgical procedure  Age/Sex: 75 y.o. male  Surgery Date: 7/8/25  Procedure: Transcatheter aortic valve replacement with a 29 mm Muñoz MAURICE 3 Ultra Resilia bioprosthetic valve via a percutaneous left transfemoral approach.   Anesthesia: general endotracheal anesthesia with transesophageal echocardiogram guidance     POD#1 Progress Note:  No issues/events overnight. Neuro/vasc intact, groin site stable. At baseline mental status with mild-mod dementia, calm and pleasant and able to answer questions appropriately. EKG SR. CXR with chronic emphysema changes. Weaned to RA this AM. Good response to lasix IV x 1 yest, net negative 2300. Delined and griffin removed. Echo showed normal TAVR fnx/gradients, no AI/PVL, normal LVEF. Plan:  continue cardiac meds, pain control, GI ppx, accuchecks w/ SSI.     Admission Orders: Date/Time/Statement:   Admission Orders (From admission, onward)       Ordered        07/08/25 0714  Inpatient Admission  Once                          Orders Placed This Encounter   Procedures    Inpatient Admission     Standing Status:   Standing     Number of Occurrences:   1     Level of Care:   Level 1 Stepdown [13]     Estimated length of stay:   More than 2 Midnights     Certification:   I certify that inpatient services are medically necessary for this patient for a duration of greater than two midnights. See H&P and MD Progress Notes for additional information about the patient's course of treatment.     Diet: oral diet with 1800 mL fluid restriction   Mobility: OOB and ambulatory  DVT Prophylaxis: arixtra, scd, ambulation    Medications/Pain Control:   Scheduled Medications:  aspirin, 81 mg, Oral, Daily  atorvastatin, 40 mg, Oral, HS  carBAMazepine, 400 mg, Oral, BID  chlorhexidine, 15 mL, Mouth/Throat, BID  clopidogrel, 75 mg, Oral, Daily  cyanocobalamin, 1,000 mcg, Oral, Daily  donepezil, 10 mg, Oral, HS  fondaparinux, 2.5 mg, Subcutaneous,  Daily  gabapentin, 300 mg, Oral, HS  losartan, 100 mg, Oral, Daily  memantine, 10 mg, Oral, BID  metoprolol succinate, 50 mg, Oral, BID  mupirocin, 1 Application, Nasal, Q12H NEIL  pantoprazole, 40 mg, Oral, Daily Before Breakfast  polyethylene glycol, 17 g, Oral, Daily      Continuous IV Infusions:  niCARdipine, 1-15 mg/hr, Intravenous, Titrated      PRN Meds:  acetaminophen, 650 mg, Rectal, Q4H PRN  acetaminophen, 650 mg, Oral, Q4H PRN  bisacodyl, 10 mg, Rectal, Daily PRN  calcium gluconate, 2 g, Intravenous, Once PRN  hydrALAZINE, 10 mg, Intravenous, Q6H PRN  ondansetron, 4 mg, Intravenous, Q6H PRN      Vital Signs (last 3 days)       Date/Time Temp Pulse Resp BP MAP (mmHg) Arterial Line BP MAP SpO2 Calculated FIO2 (%) - Nasal Cannula Nasal Cannula O2 Flow Rate (L/min) O2 Device Brandon Coma Scale Score Pain    07/09/25 0859 -- 80 -- 91/50 -- -- -- -- -- -- -- -- --    07/09/25 0738 -- -- -- -- -- -- -- -- 20 0 L/min None (Room air) 15 --    07/09/25 0700 97.8 °F (36.6 °C) 78 20 125/58 83 -- -- 97 % -- -- -- -- --    07/09/25 0636 -- 81 23 120/60 85 -- -- 97 % -- -- -- -- --    07/09/25 0604 -- 80 16 121/59 85 -- -- 96 % -- -- -- -- --    07/09/25 0500 -- 79 16 116/59 82 -- -- 98 % -- -- -- -- --    07/09/25 0400 -- 77 13 126/58 84 -- -- 98 % 32 3 L/min Nasal cannula 15 No Pain    07/09/25 0300 97.5 °F (36.4 °C) 78 14 105/56 74 -- -- 98 % -- -- -- -- --    07/09/25 0232 -- 78 15 135/63 91 -- -- 98 % -- -- -- -- --    07/09/25 0200 -- 77 13 118/59 83 -- -- 98 % -- -- -- -- --    07/09/25 0144 -- 81 21 120/60 81 -- -- 98 % -- -- -- -- --    07/09/25 0100 -- 72 12 106/57 78 93/36 51 mmHg 98 % -- -- -- -- --    07/09/25 0030 -- 76 15 118/59 83 115/42 62 mmHg 98 % -- -- -- -- --    07/09/25 0000 -- 78 16 133/65 91 141/52 79 mmHg 98 % 32 3 L/min Nasal cannula 15 No Pain    07/08/25 2339 -- 75 11 117/59 84 110/41 60 mmHg 98 % -- -- -- -- --    07/08/25 2315 -- 78 11 123/59 85 -- -- 98 % -- -- -- -- --    07/08/25 2300 --  78 12 147/74 102 -- -- 97 % -- -- -- -- --    07/08/25 2242 -- 75 13 140/65 94 132/50 75 mmHg 98 % -- -- -- -- --    07/08/25 2200 -- 76 13 145/68 98 147/54 81 mmHg 97 % -- -- -- -- --    07/08/25 2143 -- 75 13 127/62 88 127/48 71 mmHg 98 % -- -- -- -- --    07/08/25 2100 -- 78 32 143/76 103 137/57 83 mmHg 96 % -- -- -- -- --    07/08/25 2034 -- 75 14 125/59 85 133/50 73 mmHg 97 % -- -- -- -- --    07/08/25 2000 -- 74 14 129/63 89 144/54 80 mmHg 97 % 32 3 L/min Nasal cannula 15 No Pain    07/08/25 1930 -- 75 17 130/67 91 149/46 69 mmHg 96 % -- -- -- -- --    07/08/25 1915 -- 76 18 -- -- 140/44 68 mmHg 97 % -- -- -- -- --    07/08/25 1900 -- 76 18 114/57 79 138/39 60 mmHg 96 % -- -- -- 15 --    07/08/25 1845 -- 76 16 -- -- 126/36 55 mmHg 97 % -- -- -- -- --    07/08/25 1830 -- 75 15 111/57 80 134/37 57 mmHg 97 % -- -- -- -- --    07/08/25 1815 -- 76 18 -- -- 120/34 52 mmHg 97 % -- -- -- -- --    07/08/25 1800 -- 77 20 109/58 80 133/38 57 mmHg 97 % -- -- -- 15 --    07/08/25 1745 -- 77 16 -- -- 136/38 58 mmHg 98 % -- -- -- -- --    07/08/25 1730 -- 75 17 110/58 80 146/39 60 mmHg 97 % -- -- -- -- --    07/08/25 1715 -- 77 16 -- -- 160/45 68 mmHg 96 % -- -- -- -- --    07/08/25 1700 -- 80 20 131/62 87 153/45 70 mmHg 83 % -- -- -- 15 --    07/08/25 1645 -- 79 18 -- -- 155/50 75 mmHg 86 % -- -- -- -- --    07/08/25 1630 -- 72 13 108/59 78 141/43 62 mmHg 98 % -- -- -- -- --    07/08/25 1615 -- 73 19 -- -- 131/39 58 mmHg 98 % -- -- -- -- --    07/08/25 1600 -- 72 16 104/55 73 139/43 62 mmHg 97 % 32 3 L/min Nasal cannula 15 --    07/08/25 1545 -- 71 19 -- -- 139/48 68 mmHg 97 % -- -- -- -- --    07/08/25 1530 -- 72 29 102/59 77 145/48 68 mmHg 96 % -- -- -- -- --    07/08/25 1515 -- 72 22 -- -- 131/44 62 mmHg 96 % -- -- -- -- --    07/08/25 1500 -- 73 19 108/56 76 143/45 63 mmHg 97 % -- -- -- 15 --    07/08/25 1450 -- 76 -- 96/51 -- -- -- -- -- -- -- -- --    07/08/25 1448 -- 76 -- 96/51 -- -- -- -- -- -- -- -- --    07/08/25  1445 -- 73 14 -- -- 130/41 58 mmHg 97 % -- -- -- -- --    07/08/25 1430 -- 74 16 100/56 75 137/42 60 mmHg 97 % -- -- -- -- --    07/08/25 1415 -- 75 26 -- -- 127/40 56 mmHg 96 % -- -- -- -- --    07/08/25 1400 -- 76 13 96/51 69 123/39 56 mmHg 98 % -- -- -- 15 --    07/08/25 1345 -- 76 20 -- -- 129/38 56 mmHg 97 % -- -- -- -- --    07/08/25 1330 -- 77 18 115/55 79 155/45 65 mmHg 95 % -- -- -- -- --    07/08/25 1300 -- 77 21 110/58 79 162/48 72 mmHg 95 % 32 3 L/min Nasal cannula 15 --    07/08/25 1258 -- 76 -- 111/58 -- -- -- -- -- -- -- -- --    07/08/25 1245 -- 71 21 -- -- 143/46 67 mmHg 96 % -- -- -- -- --    07/08/25 1230 -- 69 16 111/58 81 144/47 68 mmHg 97 % -- -- -- -- --    07/08/25 1220 -- 71 18 111/76 -- 154/50 72 mmHg 95 % 32 3 L/min Nasal cannula -- --    07/08/25 1200 -- 71 22 111/60 80 153/50 72 mmHg 94 % -- -- -- 15 --    07/08/25 1130 97.5 °F (36.4 °C) 66 12 108/55 76 141/43 63 mmHg 94 % 32 3 L/min Nasal cannula 15 No Pain    07/08/25 1100 -- 65 15 104/58 76 132/42 61 mmHg 97 % 32 3 L/min Nasal cannula 15 No Pain    07/08/25 1030 -- 67 20 107/58 79 137/46 66 mmHg 95 % -- -- -- 15 --    07/08/25 1015 -- 67 16 106/58 79 139/45 65 mmHg 95 % 32 3 L/min Nasal cannula 15 No Pain    07/08/25 1000 -- 68 16 109/56 76 137/44 65 mmHg 96 % 32 3 L/min Nasal cannula 15 No Pain    07/08/25 0945 -- 68 16 112/56 77 136/45 66 mmHg 96 % 32 3 L/min Nasal cannula 15 No Pain    07/08/25 0930 97.9 °F (36.6 °C) 69 18 113/58 79 137/51 72 mmHg 95 % 32 3 L/min Nasal cannula 15 No Pain    07/08/25 0915 -- 70 16 119/61 85 140/55 77 mmHg 95 % 32 3 L/min Nasal cannula 15 No Pain    07/08/25 0900 -- 76 18 119/61 -- 145/55 15 mmHg 97 % 32 3 L/min Nasal cannula 15 No Pain    07/08/25 0859 97.4 °F (36.3 °C) 77 16 -- -- 143/54 -- 96 % 32 3 L/min Nasal cannula 15 No Pain    07/08/25 0618 -- -- -- -- -- -- -- -- 28 2 L/min Nasal cannula -- --    07/08/25 0500 96.4 °F (35.8 °C) 77 20 174/94 -- -- -- 96 % -- -- None (Room air) -- No Pain           Weight (last 2 days)       Date/Time Weight    07/09/25 0553 87.7 (193.34)    07/08/25 1450 88.3 (194.67)    07/08/25 1130 88.3 (194.56)    07/08/25 0500 88.3 (194.56)            Pertinent Labs/Diagnostic Test Results:   Radiology:  XR chest portable   Final Interpretation by Álvaro Cristobal MD (07/09 0851)      Resolved or decreased interstitial edema.            Workstation performed: [id number]         XR chest portable   Final Interpretation by Álvaro Cristobal MD (07/09 0842)      Possible interstitial edema.         Workstation performed: [id number]           Cardiology:  Echo complete w/ contrast if indicated   Final Result by Jordan Basurto MD (07/08 1754)        Left Ventricle: Left ventricular cavity size is normal. Wall thickness    is moderately increased. There is moderate concentric hypertrophy. The    left ventricular ejection fraction is 70%. Systolic function is    hyperdynamic. Wall motion is normal. Diastolic function is mildly    abnormal, consistent with grade I (abnormal) relaxation.  Left atrial    filling pressure is elevated.     Right Ventricle: Right ventricular cavity size is normal. Systolic    function is normal.     Right Atrium: The atrium is mildly dilated.     Aortic Valve: There is an Muñoz MAURICE 3 Ultra Resilia 29 mm TAVR    bioprosthetic valve. The prosthetic valve appears well-seated and appears    to be functioning normally. There is no evidence of paravalvular    regurgitation. There is no evidence of transvalvular regurgitation. The    gradient recorded across the prosthetic aortic valve is within the    expected range. Aortic valve peak velocity is 2.01 m/s. AV mean gradient    is 8 mmHg. DVI is 0.57. AV valve area is 1.78 cm2.     Mitral Valve: There is severe annular calcification. There is mild    regurgitation. There is mild stenosis. MV mean gradient antegrade is 4    mmHg at a heart rate of 75 bpm.     Tricuspid Valve: There is mild  regurgitation. The right ventricular    systolic pressure is normal. The estimated right ventricular systolic    pressure is 27.00 mmHg.         KATIE intraop interventional w/realtime guidance of cardiac procedures   Final Result by SYSTEMGENERABONNIE JUAREZ ([id number])   This order contains the linked images for the KATIE that was performed by    the Anesthesiologist.  Please see the  CARDIAC KATIE ANESTHESIA procedure    for results.      ECG 12 lead   Final Result by Александр Reaves MD ([id number])   Normal sinus rhythm   Prolonged QT   Abnormal ECG   When compared with ECG of 2025 09:04, (unconfirmed)   T wave inversion no longer evident in Lateral leads   Confirmed by Александр Reaves ([id number]) on 2025 10:18:42 PM      ECG 12 lead   Final Result by Александр Reaves MD ([id number])   Normal sinus rhythm   ST & T wave abnormality, consider lateral ischemia   Prolonged QT   Abnormal ECG   When compared with ECG of 2025 07:25,   ST no longer elevated in Anterior leads   T wave inversion now evident in Anterolateral leads   Confirmed by Александр Reaves ([id number]) on 2025 10:18:25 PM      Cardiac catheterization   Preliminary Result by Charlie Rangel DO ([id number])   OPERATIVE REPORT   PATIENT NAME: Jon Morton     :  1950   MRN: [id number]   Pt Location: [facility] HYBRID OR ROOM 02      SURGERY DATE: 2025      Surgeons and Role:   Panel 1:      * Aris Faulkner DO - Primary      * Kunal Salvador PA-C - Assisting   Panel 2:      * Charlie Rangel DO - Primary      Co-Surgeons: Charlie Rangel DO; Aris Faulkner DO         PREOPERATIVE DIAGNOSIS Symptomatic severe aortic stenosis.       POSTOPERATIVE DIAGNOSIS Symptomatic severe aortic stenosis.       PROCEDURE Transcatheter aortic valve replacement with a 29 mm Muñoz    MAURICE 3 ULTRA RESILIA bioprosthetic valve via a percutaneous left    transfemoral approach.       ANESTHESIA Dr. Manuela Raya, general  endotracheal anesthesia with    transesophageal echocardiogram guidance.          After informed consent was obtained, patient brought to hybrid operating    room in fasting state. Time out was performed. Using modified seldinger    technique sheaths were placed in the left  common  femoral artery and vein    respectively.    The left  common  femoral artery was also used for placement of delivery    sheath. The vessel was accessed using modified seldinger technique.  Using    fluoroscopy a temporary wire was advanced into RV apex through    transfemoral sheath.  The coplanar view was obtained using pigtail    catheter placed in ascending aorta with subsequent ascending aortography.   The TAVR delivery sheath was ulltimately advanced over a stiff wire.  Next    the 29 mm Muñoz MAURICE 3 ULTRA RESILIA valve was implanted using rapid    pacing with fluoro and KATIE guidance.  There was no significant    paravalvular leak appreciated post implant. The sheaths were removed and    hemostasis obtained by manual compression of the venous sheath.  The TAVR    delivery sheath was removed and percutaneous closure was obtained using    Preclose technique with two Proglide devices deployed pre sheath    insertion. Patient left the hybrid operating room in stable condition.      Impression.  Successful 29mm Muñoz MAURICE 3 ULTRA RESILIA transcatheter    aortic valve replacement.          SIGNATURE: Charlie Rangel DO   DATE: July 8, 2025   TIME: 9:37 AM      NOTE: A cardiac surgeon as co-surgeon was required as is a CMS requirement    as well as needed for conventional open (non catheter based) surgical    skills should become necessary.              GI:  No orders to display           Results from last 7 days   Lab Units 07/09/25  0501 07/08/25  0905 07/08/25  0849 07/08/25  0833 07/08/25  0802   WBC Thousand/uL 8.17  --   --   --   --    HEMOGLOBIN g/dL 12.5 12.6  --   --   --    I STAT HEMOGLOBIN g/dl  --   --  10.9*  11.2* 12.9   HEMATOCRIT % 37.9 37.8  --   --   --    HEMATOCRIT, ISTAT %  --   --  32* 33* 38   PLATELETS Thousands/uL 153 154  --   --   --          Results from last 7 days   Lab Units 07/09/25  0501 07/08/25  0905 07/08/25  0849 07/08/25  0833 07/08/25  0802   SODIUM mmol/L 139 137  --   --   --    POTASSIUM mmol/L 4.1 4.1  --   --   --    CHLORIDE mmol/L 103 106  --   --   --    CO2 mmol/L 29 25  --   --   --    CO2, I-STAT mmol/L  --   --  23 24 27   ANION GAP mmol/L 7 6  --   --   --    BUN mg/dL 25 25  --   --   --    CREATININE mg/dL 0.99 0.96  --   --   --    EGFR ml/min/1.73sq m 73 76  --   --   --    CALCIUM mg/dL 8.5 8.7  --   --   --    CALCIUM, IONIZED, ISTAT mmol/L  --   --  1.18 1.22 1.26   MAGNESIUM mg/dL 1.8*  --   --   --   --          Results from last 7 days   Lab Units 07/08/25  0907   POC GLUCOSE mg/dl 89     Results from last 7 days   Lab Units 07/09/25  0501 07/08/25  0905   GLUCOSE RANDOM mg/dL 102 99     Results from last 7 days   Lab Units 07/08/25  0849 07/08/25  0833 07/08/25  0802   PH, [place] I-STAT   --   --  7.318   PCO2, [place] ISTAT mm HG  --   --  50.7*   PO2, [place] ISTAT mm HG  --   --  54.0*   HCO3, [place] ISTAT mmol/L  --   --  26.0   I STAT BASE EXC mmol/L -4* -3* -1   I STAT O2 SAT % 99* 97* 85   ISTAT PH ART  7.332* 7.318*  --    I STAT ART PCO2 mm HG 41.5 45.0*  --    I STAT ART PO2 mm .0* 99.0  --    I STAT ART HCO3 mmol/L 22.0 23.1  --      Results from last 7 days   Lab Units 07/08/25  1111 07/08/25  1110 07/08/25  1104   UNIT PRODUCT CODE  S7303V66  K6967V00  K7855G28  E4195P73 Z3650N55  T8796P26  V1112T70  F3199Z00 I9391B39  T9342N85  E8122P98  N2428D82   UNIT NUMBER  [id number]  [id number]-*  [id number]  [id number]  [id number]  [id number]  [id number]-* [id number]  [id number]  [id number]-*  [id number]   UNITABO  O  O  O  O O  O  O  O O  O  O  O   UNITRH  POS  POS  POS  POS POS   POS  POS  POS POS  POS  POS  POS   CROSSMATCH   --   --  Compatible  Compatible  Compatible  Compatible   UNIT DISPENSE STATUS  Return to Inv  Return to Inv  Return to Inv  Return to Inv Issued  Issued  Issued  Issued Crossmatched  Multiple XM'd  Multiple XM'd  Crossmatched   UNIT PRODUCT VOL mL 350  350  350  350 350  350  350  350 350  350  350  350     Network Utilization Review Department  ATTENTION: Please call with any questions or concerns to [phone] and carefully listen to the prompts so that you are directed to the right person. All voicemails are confidential.   For Discharge needs, contact Care Management DC Support Team at [phone] opt. 2  Send all requests for admission clinical reviews, approved or denied determinations and any other requests to dedicated fax number below belonging to the campus where the patient is receiving treatment. List of dedicated fax numbers for the Facilities:  FACILITY NAME UR FAX NUMBER   ADMISSION DENIALS (Administrative/Medical Necessity) [phone]   DISCHARGE SUPPORT TEAM (NETWORK) [phone]   PARENT CHILD HEALTH (Maternity/NICU/Pediatrics) [phone]   [facility] [phone]   [facility] [phone]   [facility] [phone]   [facility] [phone]   [facility] [phone]   [facility] [phone]   [facility] [phone]   [facility] [phone]   [facility] [phone]   [facility] [phone]   [facility] [phone]   [facility] [phone]

## 2025-07-09 NOTE — PROGRESS NOTES
Progress Note - Cardiac Surgery   Jon Morton 75 y.o. male MRN: [id number]  Unit/Bed#: PPHP-309-01 Encounter: [id number]    Severe AS S/P L TF TAVR 29mm S3 UR POD # 1    24 Hour Events: No issues/events overnight. Neuro/vasc intact, groin site stable. At baseline mental status with mild-mod dementia, calm and pleasant and able to answer questions appropriately. EKG SR. CXR with chronic emphysema changes. Weaned to RA this AM. Good response to lasix IV x 1 yest, net negative 2300. Delined and griffin removed. Echo showed normal TAVR fnx/gradients, no AI/PVL, normal LVEF.     Medications:   Scheduled Meds:  Current Facility-Administered Medications   Medication Dose Route Frequency Provider Last Rate    acetaminophen  650 mg Rectal Q4H PRN Kunal Salvador PA-C      acetaminophen  650 mg Oral Q4H PRN Kunal Salvador PA-C      aspirin  81 mg Oral Daily Kunal Salvador PA-C      atorvastatin  40 mg Oral HS Kunal Salvador PA-C      bisacodyl  10 mg Rectal Daily PRN Kunal Salvador PA-C      calcium gluconate  2 g Intravenous Once PRN Kunal Salvador PA-C      carBAMazepine  400 mg Oral BID Humera Garnett PA-C      cefazolin  2,000 mg Intravenous Q8H Kunal Salvador PA-C 2,000 mg (07/09/25 0116)    chlorhexidine  15 mL Mouth/Throat BID Kunal Salvador PA-C      clopidogrel  75 mg Oral Daily Kunal Salvador PA-C      cyanocobalamin  1,000 mcg Oral Daily Kunal Salvador PA-C      donepezil  10 mg Oral HS Kunal Salvador PA-C      fondaparinux  2.5 mg Subcutaneous Daily Kunal Salvador PA-C      gabapentin  300 mg Oral HS Kunal Salvador PA-C      hydrALAZINE  10 mg Intravenous Q6H PRN Kunal Salvador PA-C      losartan  100 mg Oral Daily Kunal Salvador PA-C      memantine  10 mg Oral BID Kunal Salvador PA-C      metoprolol succinate  50 mg Oral BID Kunal Salvador PA-C      mupirocin  1 Application Nasal Q12H NEIL Kunal Salvador PA-C      niCARdipine  1-15 mg/hr Intravenous Titrated Kunal Salvador PA-C Stopped  (25 1400)    ondansetron  4 mg Intravenous Q6H PRN Kunal Salvador PA-C      pantoprazole  40 mg Oral Daily Before Breakfast Kunal Salvador PA-C      polyethylene glycol  17 g Oral Daily Kunal Salvador PA-C       Continuous Infusions:niCARdipine, 1-15 mg/hr, Last Rate: Stopped (25 1400)      PRN Meds:.  acetaminophen    acetaminophen    bisacodyl    calcium gluconate    hydrALAZINE    ondansetron    Vitals:   Vitals:    25 0500 25 0553 25 0604 25 0636   BP: 116/59  121/59 120/60   Pulse: 79  80 81   Resp: 16  16 (!) 23   Temp:       TempSrc:       SpO2: 98%  96% 97%   Weight:  87.7 kg (193 lb 5.5 oz)     Height:           Telemetry: NSR; Heart Rate: 70s-80s    Respiratory:   SpO2: SpO2: 97 %; 3 LPM    Intake/Output:     Intake/Output Summary (Last 24 hours) at 2025 0649  Last data filed at 2025 0116  Gross per 24 hour   Intake 875 ml   Output 3250 ml   Net -2375 ml        Weights:   Weight (last 2 days)       Date/Time Weight    25 0553 87.7 (193.34)    25 1450 88.3 (194.67)    25 1130 88.3 (194.56)    25 0500 88.3 (194.56)          Admit weight: 194 lbs    Results:   Results from last 7 days   Lab Units 25  0501 25  0905 25  0849   WBC Thousand/uL 8.17  --   --    HEMOGLOBIN g/dL 12.5 12.6  --    I STAT HEMOGLOBIN g/dl  --   --  10.9*   HEMATOCRIT % 37.9 37.8  --    HEMATOCRIT, ISTAT %  --   --  32*   PLATELETS Thousands/uL 153 154  --      Results from last 7 days   Lab Units 25  0501 25  0905 25  0849   POTASSIUM mmol/L 4.1 4.1  --    CHLORIDE mmol/L 103 106  --    CO2 mmol/L 29 25  --    CO2, I-STAT mmol/L  --   --  23   BUN mg/dL 25 25  --    CREATININE mg/dL 0.99 0.96  --    GLUCOSE, ISTAT mg/dl  --   --  97   CALCIUM mg/dL 8.5 8.7  --          Recent Labs     25  0501   MG 1.8*     Point of care glucose: 89    Studies:    EC/9  NSR 80    CXR:   Chronic emphysema changes, improved pulm vasc  congestion from yest    Echocardiogram: 7/8    Left Ventricle: Left ventricular cavity size is normal. Wall thickness is moderately increased. There is moderate concentric hypertrophy. The left ventricular ejection fraction is 70%. Systolic function is hyperdynamic. Wall motion is normal. Diastolic function is mildly abnormal, consistent with grade I (abnormal) relaxation.  Left atrial filling pressure is elevated.    Right Ventricle: Right ventricular cavity size is normal. Systolic function is normal.    Right Atrium: The atrium is mildly dilated.    Aortic Valve: There is an Muñoz MAURICE 3 Ultra Resilia 29 mm TAVR bioprosthetic valve. The prosthetic valve appears well-seated and appears to be functioning normally. There is no evidence of paravalvular regurgitation. There is no evidence of transvalvular regurgitation. The gradient recorded across the prosthetic aortic valve is within the expected range. Aortic valve peak velocity is 2.01 m/s. AV mean gradient is 8 mmHg. DVI is 0.57. AV valve area is 1.78 cm2.    Mitral Valve: There is severe annular calcification. There is mild regurgitation. There is mild stenosis. MV mean gradient antegrade is 4 mmHg at a heart rate of 75 bpm.    Tricuspid Valve: There is mild regurgitation. The right ventricular systolic pressure is normal. The estimated right ventricular systolic pressure is 27.00 mmHg    Results Review Statement: I personally reviewed the following image studies in PACS and associated radiology reports: EKG, chest xray, and Echocardiogram. My interpretation of the radiology images/reports is: same as above.    Invasive Lines/Tubes:  Invasive Devices       Central Venous Catheter Line  Duration             CVC Central Lines 07/08/25 <1 day              Peripheral Intravenous Line  Duration             Peripheral IV 04/29/25 Left Forearm 70 days    Peripheral IV 07/08/25 Right Forearm <1 day    Peripheral IV 07/08/25 Right Forearm <1 day                     Physical Exam:    General: No acute distress and Alert  HEENT/NECK:  Normocephalic. Atraumatic.  No jugular venous distention.    Cardiac: Regular rate and rhythm and No murmurs/rubs/gallops  Pulmonary:  Breath sounds clear bilaterally  Abdomen:  Non-tender, Non-distended, and Normal bowel sounds  Incisions: left Groin puncture sites clean, dry, and intact without hematoma  Extremities: Extremities warm/dry and DP pulses palpable bilaterally  Neuro: Oriented to place and reason why here, didn't known year, but that is baseline.  Skin: Warm/Dry, without rashes or lesions.    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)    Moderate protein-calorie malnutrition (HCC)    Bladder mass    Malignant neoplasm of lateral wall of urinary bladder (HCC)    S/P TAVR (transcatheter aortic valve replacement)    Other emphysema (HCC)    Multiple pulmonary nodules    Seizures (HCC)    At risk for delirium       Severe AS S/P L TF TAVR 29mm S3 UR POD # 1    Plan:    Cardiac:     Normal ventricular systolic function, EF 70%    NSR;   HR well-controlled  BP well-controlled    HTN Regimen:  Continue home toprol XL 25mg BID  Continue home losartan 100mg daily    TAVR anticoagulation regimen:   ASA/Plavix (new to plavix)    Postoperative transthoracic echocardiogram:  Echo completed; Well seated AV, without PVL/AI, normal LVEF    Continue Statin therapy    Central IV access no longer required; Remove central venous catheter today    Continue Arixtra for DVT prophylaxis    Pulmonary:     Good Room air oxygen saturation; Continue incentive spirometry/Coughing/Deep breathing exercises    Renal:     Normal preoperative renal function    Intake/Output net: -2300 mL/24 hours    Diuretic Regimen:  Responded well to lasix 40  IV x 1 yest  Not on diuretic at home  Euvolemic, normal LVEF  Hold on further diuresis    Griffin removed    Neuro:    Baseline mild-mod dementia  Pleasant and calm this AM  Neurologically intact; No active issues   Cont home aricept and namenda     Incisional pain well-controlled; Continue prn Tylenol    GI:    Tolerating clear liquid diet, without evidence of dysphagia; Initiate oral diet with 1800 mL fluid restriction    Tolerating diet without complaint    Continue stool softeners and prn suppository    Continue GI prophylaxis    Endo:     Glucose well-controlled with insulin sliding scale coverage    7.  Hematology:     Post-operative blood count acceptable; Trend prn    8.  Disposition:    Gerontology consultation ordered for routine assessment of TAVR patient over 65 years old    Anticipated discharge date: today pending voiding without griffin       VTE Pharmacologic Prophylaxis: Fondaparinux (Arixtra)  VTE Mechanical Prophylaxis: sequential compression device    Collaborative rounds completed with supervising physician  Plan of care discussed with bedside nurse    SIGNATURE: Kunal Salvador PA-C  DATE: July 9, 2025  TIME: 6:49 AM

## 2025-07-09 NOTE — ASSESSMENT & PLAN NOTE
Clinical Frail Scale: 5- Mildly Frail  More evident slowing, needs help high order IADLs (transport, bills, medications)  Progressively impairs shopping and walking outside alone, meal prep and housework   Lives alone, has daily checks from neighbors or niece  Medications in pill organizer/boxes  He reports he eats TV dinners  Continue supportive care

## 2025-07-09 NOTE — ASSESSMENT & PLAN NOTE
At risk for falls secondary to age, gait instability, frailty, medications, visual impairment  Fall precautions  Keep physically active  Recommend fall prevention/ balance training such as Matter of Balance or Glenn Chi or yoga  Fall prevention handout given from cdc.gov/steadi

## 2025-07-09 NOTE — OCCUPATIONAL THERAPY NOTE
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)    Moderate protein-calorie malnutrition (HCC)    Bladder mass    Malignant neoplasm of lateral wall of urinary bladder (HCC)    S/P TAVR (transcatheter aortic valve replacement)    Other emphysema (HCC)    Multiple pulmonary nodules    Seizures (HCC)    At risk for delirium    Frailty    Ambulatory dysfunction    Past Medical History  Past Medical History[1]  Past Surgical History  Past Surgical History[2]          07/09/25 0831   OT Last Visit   OT Visit Date 07/09/25   Note Type   Note type Evaluation   Pain Assessment   Pain Assessment Tool 0-10   Pain Score No Pain   Restrictions/Precautions   Weight Bearing Precautions Per Order No   Other Precautions Cognitive;Chair Alarm;Bed Alarm;Multiple lines;Telemetry;Fall Risk;Pain   Home Living   Type of Home Apartment   Home Layout One level;Stairs to enter with rails;Performs ADLs on one level   Bathroom Shower/Tub Walk-in shower   Bathroom Toilet Standard   Bathroom Equipment Grab bars in shower;Commode   Home Equipment Walker;Cane   Additional Comments pt questionable historian regarding home set-up; will need to verify w/ CM; pt reported 1 level apartment w/ \"couple steps to enter\"; uses RW for community mobility and has 2 canes but does not use any AD for funcitonal mobility in house; pt reprots commode is used over standard toilet   Prior Function   Level of [place] Independent with ADLs;Independent with functional mobility;Needs assistance with IADLS   Lives With (S)  Alone   Receives Help From Family;Friend(s)   IADLs Family/Friend/Other provides transportation;Independent with meal " "prep;Family/Friend/Other provides medication management   Falls in the last 6 months 0   Vocational Retired   Comments pt questionable historian regarding PLOF; will need to verify w/ CM; pt reports able to perform ADLs IND but has \"people\" come over to help him w/ medication management; pt reports using microwave to cook his meals; pt also reported family does not visit too often   Lifestyle   Autonomy PTA, pt reports IND w/ ADLs and receives assistance from \"people\" for IADLs; -, reports others provide him transportation; retired; uses microwave to cook his meals; has RW and 2 canes but only uses RW for community mobility   Reciprocal Relationships lives alone   Service to Others retired   Intrinsic Gratification enjoys watching tv   General   Family/Caregiver Present No   Subjective   Subjective \"I have people that help me with that stuff\"   ADL   Where Assessed Chair   Eating Assistance 5  Supervision/Setup   Grooming Assistance 5  Supervision/Setup   UB Bathing Assistance 5  Supervision/Setup   LB Bathing Assistance 5  Supervision/Setup   UB Dressing Assistance 5  Supervision/Setup   LB Dressing Assistance 5  Supervision/Setup   Toileting Assistance  5  Supervision/Setup   Functional Assistance 4  Minimal Assistance   Bed Mobility   Supine to Sit Unable to assess   Sit to Supine Unable to assess   Additional Comments upon arrival, pt OOB in bedside chair and ended session OOB in bedside chair; chair alarm activated; call bell provided; all needs met   Transfers   Sit to Stand 3  Moderate assistance   Additional items Assist x 1;Armrests;Increased time required;Verbal cues   Stand to Sit 3  Moderate assistance   Additional items Assist x 1;Armrests;Increased time required;Verbal cues   Additional Comments w/ RW in stance   Functional Mobility   Functional Mobility 4  Minimal assistance   Additional Comments community distances w/ Min Ax1 w/ RW and chair follow   Additional items Rolling walker   Balance " "  Static Sitting Fair +   Dynamic Sitting Fair   Static Standing Fair -   Dynamic Standing Poor +   Ambulatory Poor +   Activity Tolerance   Activity Tolerance Patient limited by fatigue   Medical Staff Made Aware Co-eval w/ PT 2/2 pt complexity and comorbidities   Nurse Made Aware RN cleared   RUE Assessment   RUE Assessment WFL   LUE Assessment   LUE Assessment X  (pt reported having numbness in digits 1-3 on L hand for \"long period of time\"; pt able to detect positioning of finger when therapist asked pt to close eyes and say whether fingers were pointed towards pt or away)   Hand Function   Gross Motor Coordination Functional   Fine Motor Coordination Functional   Sensation   Light Touch Partial deficits in the LUE   Additional Comments pt reports numbness in digits 1-3 on L hand; pt unable to feel pressure being applied to proximal index finger from therapist; pt reports this is his baseline sensation for as long as he can remember   Cognition   Overall Cognitive Status (S)  Impaired   Arousal/Participation Alert;Cooperative   Attention Attends with cues to redirect   Orientation Level Oriented to person;Oriented to place;Oriented to situation;Disoriented to time  (pt knew month but unable to report day of month and reports year was 2028; pt knew general situation)   Memory Decreased recall of biographical information;Decreased recall of precautions;Decreased short term memory   Following Commands Follows one step commands with increased time or repetition   Comments pt pleasant and cooperative; pt presented w/ limited insight to safety awareness and current condition; pt required frequent VCs for redirection back to task; per pt's prevous OT note, scored a 4.0 on ACLS back on November 3rd, 2024 indicating 24/7 supervision, continue recommended 24/7 supervision and care; pt would benefit from further functional cognitive assessments   Assessment   Limitation Decreased ADL status;Decreased Safe judgement during " "ADL;Decreased cognition;Decreased endurance   Prognosis Good   Assessment 75 year old pt seen today for an OT evaluation following admission to [facility] s/p TAVR 2/2 severe AS on 7/8 with present symptoms significant for pain, fatigue, weakness, decreased ADL status, decreased functional mobility. Pt has a past medical history of Aortic stenosis, Bladder cancer ([facility]), Dementia ([facility]), bladder cancer, Hypercholesterolemia, Hyperlipidemia, Hypertension, Memory difficulties, Neuropathy, Seizures ([facility]), and Stroke ([facility]).  Pt reports living alone in 1 level apartment w/ \"couple steps to enter\"; walk-in shower w/ grab bars; standard toilet w/ commode over it. PTA, pt reports IND w/ ADLs and receives assistance w/ IADLs; retired; -; has RW and 2 canes but reports only using RW for community mobility. Pt questionable historian regarding home set-up and PLOF; will need to verify w/ CM. Pt reported no pain t/o session. Pt very pleasant and cooperative t/o session. Pt was Mod Ax1 for functional STS txfs with RW. Min Ax1 for functional mobility with RW. Pt was SUP for UB ADLs and LB ADLs. The patient's raw score on the AM-PAC Daily Activity Inpatient Short Form is 20. A raw score of greater than or equal to 19 suggests the patient may benefit from discharge to home. Please refer to the recommendation of the Occupational Therapist for safe discharge planning. Pt is functioning below baseline level of function and will continue to benefit from skilled acute OT to promote increased independence and return to PLOF. Pt currently lives alone and scored a 4.0 on ACLS back on November 3rd, 2024 indicating requiring 24/7 supervision; continue recommendations for 24/7 supervision. At this time, current OT recommendation is Level 2 Resources pending progress and level of support.   Goals   Patient Goals to get better   LTG Time Frame 10-14   Long Term Goal #1 see below for goals   Plan   Treatment Interventions " ADL retraining;Functional transfer training;Endurance training;Cognitive reorientation;Patient/family training;Energy conservation;Activityengagement   Goal Expiration Date 07/23/25   OT Frequency 2-3x/wk   Discharge Recommendation   Rehab Resource Intensity Level, OT II (Moderate Resource Intensity)  (pending progress and level of support)   AM-PAC Daily Activity Inpatient   Lower Body Dressing 3   Bathing 3   Toileting 3   Upper Body Dressing 3   Grooming 4   Eating 4   Daily Activity Raw Score 20   Daily Activity Standardized Score (Calc for Raw Score >=11) 42.03   AM-PAC Applied Cognition Inpatient   Following a Speech/Presentation 3   Understanding Ordinary Conversation 3   Taking Medications 2   Remembering Where Things Are Placed or Put Away 2   Remembering List of 4-5 Errands 2   Taking Care of Complicated Tasks 2   Applied Cognition Raw Score 14   Applied Cognition Standardized Score 32.02   End of Consult   Education Provided Yes   Patient Position at End of Consult Bedside chair;Bed/Chair alarm activated;All needs within reach   Nurse Communication Nurse aware of consult         Pt will be Mod I for UB dressing and bathing ADLs with use of appropriate AE PRN within this care plan.    Pt will be Mod I for LB dressing and bathing ADLs with use of appropriate AE PRN within this care plan.    Pt will be Mod I with bed mobility with good sitting balance/tolerance to engage in self care tasks within this care plan.    Pt will be Mod I in completing functional transfers with use of appropriate AD within this care plan.    Pt will participate in further functional cognitive assessments to achieve Fair+ attention to tasks for safe return to PLOF upon d/c.    Pt will increase activity tolerance in ADLs to 30 minutes to improve occupational performance within this care plan.    Pt will improve standing tolerance to 2-3 minutes to improve occupational performance within this care plan.    Pt will improve performance  in ADLs that require reaching outside [place] to PLOF/baseline to improve strength and endurance within this care plan.          DANIELA Younger             [1]   Past Medical History:  Diagnosis Date    Aortic stenosis     Bladder cancer (HCC)     Dementia (HCC)     Hx of bladder cancer     Hypercholesterolemia     Hyperlipidemia     Hypertension     Memory difficulties     Neuropathy     Seizures (HCC)     Stroke (HCC)    [2]   Past Surgical History:  Procedure Laterality Date    BACK SURGERY      CARDIAC CATHETERIZATION N/A 4/29/2025    Procedure: Cardiac RHC/LHC;  Surgeon: Benton Finch MD;  Location: [facility];  Service: Cardiology    CARDIAC CATHETERIZATION N/A 7/8/2025    Procedure: Cardiac tavr;  Surgeon: Charlie Rangel DO;  Location: [facility];  Service: Cardiology    DENTAL SURGERY      MT REPLACE AORTIC VALVE OPENFEMORAL ARTERY APPROACH N/A 7/8/2025    Procedure: REPLACEMENT AORTIC VALVE TRANSCATHETER (TAVR) TRANSFEMORAL W/ 29MM VALVE(ACCESS ON LEFT) KATIE;  Surgeon: Aris Faulkner DO;  Location: [facility];  Service: Cardiac Surgery

## 2025-07-09 NOTE — CASE MANAGEMENT
Case Management Assessment & Discharge Planning Note    Patient name Jon Morton  Location [facility]01 MRN [id number]  : 1950 Date 2025       Current Admission Date: 2025  Current Admission Diagnosis:Severe aortic stenosis   Patient Active Problem List    Diagnosis Date Noted    Frailty 2025    Ambulatory dysfunction 2025    S/P TAVR (transcatheter aortic valve replacement) 2025    Other emphysema (HCC) 2025    Multiple pulmonary nodules 2025    Seizures ([facility]) 2025    At risk for delirium 2025    S/P cardiac cath 2025    Malignant neoplasm of lateral wall of urinary bladder ([facility]) 2025    Hematuria 02/15/2025    Bladder mass 02/15/2025    Acute metabolic encephalopathy 02/15/2025    Severe aortic stenosis 2025    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) ([facility]) 2024    Moderate protein-calorie malnutrition ([facility]) 2024    Stroke ([facility]) 2024    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    HTN (hypertension) 2024    Dementia ([facility]) 2024      LOS (days): 1  Geometric Mean LOS (GMLOS) (days): 1.3  Days to GMLOS:0     OBJECTIVE:  Risk of Unplanned Readmission Score: 15.53       Current admission status: Inpatient       [place] Pharmacy[place]   [place] [address] [facility]  [facility]  [place] [address]  Phone: [phone] Fax: [phone]    Primary Care Provider: Sb Crawford MD    Primary Insurance: [employer] [facility]  Secondary Insurance:     ASSESSMENT:  Patient Information  Admitted from:: Home  Mental Status: Alert  During Assessment patient was accompanied by: Not accompanied during assessment  Assessment information provided by:: Other - please comment (patient kody Lyles & chart)  Support Systems: Self, Family members, Home care staff  What city do you live in?: [place]  Home entry access options.  Select all that apply.: No steps to enter home  Type of Current Residence: Apartment ([facility])  Floor Level: 1  Upon entering residence, is there a bedroom on the main floor (no further steps)?: Yes  Upon entering residence, is there a bathroom on the main floor (no further steps)?: Yes  Living Arrangements: Lives Alone  Is patient a ?: No    Activities of Daily Living Prior to Admission  Functional Status: Independent  Completes ADLs independently?: Yes  Ambulates independently?: Yes  Does patient currently own DME?: Yes  What DME does the patient currently own?: Walker, Straight Cane  Does the patient have a history of Short-Term Rehab?: No  Does patient have a history of HHC?: Yes ([facility] in past)  Does patient currently have HHC?: Yes    Current Home Health Care  Type of Current Home Care Services: Nurse visit, Home OT  Home Health Agency Name:: Other ([facility])  Current Home Health Follow-Up Provider:: PCP    Patient Information Continued  Income Source: Pension/USP  Does patient have prescription coverage?: Yes  Does patient receive dialysis treatments?: No    Means of Transportation  Means of Transport to Appts:: Family transport    DISCHARGE DETAILS:    Discharge planning discussed with:: patient kody & LOURDES Lyles via phone, provider, bedside RN; Pt insurance, [employer] & nurse Jeannine from [facility]  Freedom of Choice: Yes     CM contacted family/caregiver?: Yes  Were Treatment Team discharge recommendations reviewed with patient/caregiver?: Yes  Did patient/caregiver verbalize understanding of patient care needs?: Yes  Were patient/caregiver advised of the risks associated with not following Treatment Team discharge recommendations?: Yes    Requested Home Health Care         Is the patient interested in HHC at discharge?: Yes  Home Health Discipline requested:: Nursing, Occupational Therapy, Physical Therapy  Home Health Agency Name:: Other ([facility])  HHA External Referral Reason (only  applicable if external HHA name selected): [facility] does not accept patient's insurance  Home Health Follow-Up Provider:: PCP  Home Health Services Needed:: Evaluate Functional Status and Safety, Gait/ADL Training, Strengthening/Theraputic Exercises to Improve Function  Homebound Criteria Met:: Uses an Assist Device (i.e. cane, walker, etc)  Supporting Clincal Findings:: Fatigues Easliy in Short Distances    DME Referral Provided  Referral made for DME?: No     Additional Comments: CM noted pt admitted for TAVR, kody is listed POA, and pt has [employer] insurance. TC placed to pt Trupti gates ([phone]). Pt resides alone at address on file, it is an apartment w/ no baron. Pt has several local supports that are with him throughout the day, Trupti is with him every day or every other day. [employer] currently provides home services; had PT in the past, OT & RN currently.    CM discussed PT/OT currently recommending pt be home with sufficient support and 24/7 supervision or rehab. Trupti agreeable to pt going home with additional home services (+PT), and shares pt will have support. Pt owns RW. Family, friends or [employer] transports pt.     Call placed to [facility] ([phone]) CM asked for pt's RN, requested callback.  states [employer] fax is [phone] & this fax is for receiving pt updates & clinicals.     After discussion with Trupti, she suggests CM contact nurse Theo, FROY called [facility], connected to [facility] by , left  requesting callback.     Callback received from VICKIE Paez who left voicemail ([phone]) . CM called back stating pt will no longer DC today per attending team. CM also faxed [employer] paper update stating pt will not be DC'ing today.     Provider team updates and aware to call Trupti with medical updates. Trupti to transport pt home tomorrow. CM met with pt bedside to discuss plan who is pleasant and agreeable. CM following.

## 2025-07-10 VITALS
WEIGHT: 195.11 LBS | HEIGHT: 77 IN | DIASTOLIC BLOOD PRESSURE: 61 MMHG | SYSTOLIC BLOOD PRESSURE: 141 MMHG | TEMPERATURE: 97.6 F | RESPIRATION RATE: 14 BRPM | BODY MASS INDEX: 23.04 KG/M2 | HEART RATE: 78 BPM | OXYGEN SATURATION: 96 %

## 2025-07-10 PROCEDURE — 99238 HOSP IP/OBS DSCHRG MGMT 30/<: CPT | Performed by: PHYSICIAN ASSISTANT

## 2025-07-10 PROCEDURE — NC001 PR NO CHARGE: Performed by: STUDENT IN AN ORGANIZED HEALTH CARE EDUCATION/TRAINING PROGRAM

## 2025-07-10 RX ORDER — CLOPIDOGREL BISULFATE 75 MG/1
75 TABLET ORAL DAILY
Qty: 90 TABLET | Refills: 0 | Status: SHIPPED | OUTPATIENT
Start: 2025-07-10

## 2025-07-10 RX ORDER — METOPROLOL SUCCINATE 25 MG/1
25 TABLET, EXTENDED RELEASE ORAL 2 TIMES DAILY
Qty: 60 TABLET | Refills: 1 | Status: SHIPPED | OUTPATIENT
Start: 2025-07-10 | End: 2025-07-10

## 2025-07-10 RX ORDER — LOSARTAN POTASSIUM 25 MG/1
25 TABLET ORAL DAILY
Qty: 30 TABLET | Refills: 0 | Status: SHIPPED | OUTPATIENT
Start: 2025-07-10

## 2025-07-10 RX ORDER — CLOPIDOGREL BISULFATE 75 MG/1
75 TABLET ORAL DAILY
Qty: 90 TABLET | Refills: 0 | Status: SHIPPED | OUTPATIENT
Start: 2025-07-10 | End: 2025-07-10

## 2025-07-10 RX ORDER — LOSARTAN POTASSIUM 25 MG/1
25 TABLET ORAL DAILY
Qty: 30 TABLET | Refills: 1 | Status: SHIPPED | OUTPATIENT
Start: 2025-07-10 | End: 2025-07-10

## 2025-07-10 RX ORDER — METOPROLOL SUCCINATE 25 MG/1
25 TABLET, EXTENDED RELEASE ORAL 2 TIMES DAILY
Qty: 60 TABLET | Refills: 0 | Status: SHIPPED | OUTPATIENT
Start: 2025-07-10

## 2025-07-10 RX ADMIN — LOSARTAN POTASSIUM 25 MG: 25 TABLET, FILM COATED ORAL at 09:20

## 2025-07-10 RX ADMIN — MUPIROCIN 1 APPLICATION: 20 OINTMENT TOPICAL at 09:20

## 2025-07-10 RX ADMIN — POLYETHYLENE GLYCOL 3350 17 G: 17 POWDER, FOR SOLUTION ORAL at 09:19

## 2025-07-10 RX ADMIN — CARBAMAZEPINE 400 MG: 200 TABLET, EXTENDED RELEASE ORAL at 09:22

## 2025-07-10 RX ADMIN — CYANOCOBALAMIN TAB 500 MCG 1000 MCG: 500 TAB at 09:19

## 2025-07-10 RX ADMIN — CHLORHEXIDINE GLUCONATE 15 ML: 1.2 SOLUTION ORAL at 09:20

## 2025-07-10 RX ADMIN — PANTOPRAZOLE SODIUM 40 MG: 40 TABLET, DELAYED RELEASE ORAL at 09:19

## 2025-07-10 RX ADMIN — METOPROLOL SUCCINATE 25 MG: 25 TABLET, EXTENDED RELEASE ORAL at 09:20

## 2025-07-10 RX ADMIN — CLOPIDOGREL BISULFATE 75 MG: 75 TABLET, FILM COATED ORAL at 09:19

## 2025-07-10 RX ADMIN — FONDAPARINUX SODIUM 2.5 MG: 2.5 INJECTION, SOLUTION SUBCUTANEOUS at 09:19

## 2025-07-10 RX ADMIN — MEMANTINE 10 MG: 10 TABLET ORAL at 09:20

## 2025-07-10 RX ADMIN — ASPIRIN 81 MG CHEWABLE TABLET 81 MG: 81 TABLET CHEWABLE at 09:20

## 2025-07-10 NOTE — CASE MANAGEMENT
Case Management Discharge Planning Note    Patient name Jon Morton  Location [facility]01 MRN [id number]  : 1950 Date 7/10/2025       Current Admission Date: 2025  Current Admission Diagnosis:Severe aortic stenosis   Patient Active Problem List    Diagnosis Date Noted    Frailty 2025    Ambulatory dysfunction 2025    S/P TAVR (transcatheter aortic valve replacement) 2025    Other emphysema (HCC) 2025    Multiple pulmonary nodules 2025    Seizures ([facility]) 2025    At risk for delirium 2025    S/P cardiac cath 2025    Malignant neoplasm of lateral wall of urinary bladder ([facility]) 2025    Hematuria 02/15/2025    Bladder mass 02/15/2025    Acute metabolic encephalopathy 02/15/2025    Severe aortic stenosis 2025    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) ([facility]) 2024    Moderate protein-calorie malnutrition ([facility]) 2024    Stroke (HCC) 2024    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[facility]) 2024    HTN (hypertension) 2024    Dementia ([facility]) 2024      LOS (days): 2  Geometric Mean LOS (GMLOS) (days): 1.3  Days to GMLOS:-0.9     OBJECTIVE:  Risk of Unplanned Readmission Score: 15.76       Current admission status: Inpatient   Preferred Pharmacy:   [facility] [place] [address] [address]  [facility] [address]  [place] [address]  Phone: [phone] Fax: [phone]    Primary Care Provider: Sb Crawford MD    Primary Insurance: [employer] [facility]  Secondary Insurance:     DISCHARGE DETAILS:    Discharge planning discussed with:: patient & kody Lyles bedside, provider  Freedom of Choice: Yes     CM contacted family/caregiver?: Yes  Were Treatment Team discharge recommendations reviewed with patient/caregiver?: Yes  Did patient/caregiver verbalize understanding of patient care needs?: Yes  Were patient/caregiver advised of the risks associated with not  following Treatment Team discharge recommendations?: Yes     Other Referral/Resources/Interventions Provided:  Interventions: HHC  Referral Comments: [facility]-PT OT Nursing    Would you like to participate in our [facility] service program?  : No - Declined     Additional Comments: CM notified pt is clear for DC today. CM notified CTS provider to add homecare instructions to AVS as requested by [facility]. CM faxed AVS to [facility] at [phone] & confirmed fax receival.    CM met with pt & kody Lyles bedside to discuss DCP. Requested [facility] pharmacy, states [facility] will have meds today, notified provider. Confirmed w/ provider that Rx has been sent to [facility] on file. DCP reviewed, pt and family agreeable

## 2025-07-10 NOTE — PROGRESS NOTES
Progress Note - Cardiac Surgery   Jon Morton 75 y.o. male MRN: [id number]  Unit/Bed#: [facility]-321-01 Encounter: [id number]    Severe AS S/P L TF TAVR 29mm S3 UR POD # 2    24 Hour Events: some hypotension yest AM, home BP meds held, given 250cc albumin, no further diuretic given. BP improved yest afternoon, home toprol XL resumed at half dose 25mg BID, and losartan resumed at reduced dose of 25 mg daily. -140 this AM. No complaints, feels well.     Medications:   Scheduled Meds:  Current Facility-Administered Medications   Medication Dose Route Frequency Provider Last Rate    acetaminophen  650 mg Rectal Q4H PRN Kunal Salvador PA-C      acetaminophen  650 mg Oral Q4H PRN Kunal Salvador PA-C      aspirin  81 mg Oral Daily Kunal Salvador PA-C      atorvastatin  40 mg Oral HS Kunal Salvador PA-C      bisacodyl  10 mg Rectal Daily PRN Kunal Salvador PA-C      carBAMazepine  400 mg Oral BID Humera Garnett PA-C      chlorhexidine  15 mL Mouth/Throat BID Kunal Salvador PA-C      clopidogrel  75 mg Oral Daily Kunal Salvador PA-C      cyanocobalamin  1,000 mcg Oral Daily Kunal Salvador PA-C      donepezil  10 mg Oral HS Kunal Salvador PA-C      fondaparinux  2.5 mg Subcutaneous Daily Kunal Salvador PA-C      gabapentin  300 mg Oral HS Kunal Salvador PA-C      hydrALAZINE  10 mg Intravenous Q6H PRN Kunal Salvador PA-C      losartan  25 mg Oral Daily Shanita Shah PA-C      memantine  10 mg Oral BID Kunal Salvador PA-C      metoprolol succinate  25 mg Oral BID Shanita Shah PA-C      mupirocin  1 Application Nasal Q12H NEIL Kunal Salvador PA-C      ondansetron  4 mg Intravenous Q6H PRN Kunal Salvador PA-C      pantoprazole  40 mg Oral Daily Before Breakfast Kunal Slavador PA-C      polyethylene glycol  17 g Oral Daily Kunal Salvador PA-C       Continuous Infusions:     PRN Meds:.  acetaminophen    acetaminophen    bisacodyl    hydrALAZINE    ondansetron    Vitals:   Vitals:    07/09/25 2240  07/10/25 0244 07/10/25 0618 07/10/25 0710   BP: 129/62 144/73  127/72   BP Location:       Pulse: 78 73  76   Resp:  14     Temp: 97.8 °F (36.6 °C) (!) 97.4 °F (36.3 °C)  97.6 °F (36.4 °C)   TempSrc:       SpO2: 97% 98%  97%   Weight:   88.5 kg (195 lb 1.7 oz)    Height:           Telemetry: NSR; Heart Rate: 70s-80s    Respiratory:   SpO2: SpO2: 97 %; RA    Intake/Output:     Intake/Output Summary (Last 24 hours) at 7/10/2025 0723  Last data filed at 2025 2351  Gross per 24 hour   Intake 960 ml   Output 700 ml   Net 260 ml        Weights:   Weight (last 2 days)       Date/Time Weight    07/10/25 0618 88.5 (195.11)    25 0553 87.7 (193.34)    25 1450 88.3 (194.67)    25 1130 88.3 (194.56)    25 0500 88.3 (194.56)          Admit weight: 194 lbs    Results:   Results from last 7 days   Lab Units 25  05025  0905 25  0849   WBC Thousand/uL 8.17  --   --    HEMOGLOBIN g/dL 12.5 12.6  --    I STAT HEMOGLOBIN g/dl  --   --  10.9*   HEMATOCRIT % 37.9 37.8  --    HEMATOCRIT, ISTAT %  --   --  32*   PLATELETS Thousands/uL 153 154  --      Results from last 7 days   Lab Units 25  0501 25  0905 25  0849   POTASSIUM mmol/L 4.1 4.1  --    CHLORIDE mmol/L 103 106  --    CO2 mmol/L 29 25  --    CO2, I-STAT mmol/L  --   --  23   BUN mg/dL 25 25  --    CREATININE mg/dL 0.99 0.96  --    GLUCOSE, ISTAT mg/dl  --   --  97   CALCIUM mg/dL 8.5 8.7  --          Recent Labs     25  0501   MG 1.8*     Point of care glucose: 89    Studies:    EC/9  NSR 80    CXR:   Chronic emphysema changes, improved pulm vasc congestion from yest    Echocardiogram:     Left Ventricle: Left ventricular cavity size is normal. Wall thickness is moderately increased. There is moderate concentric hypertrophy. The left ventricular ejection fraction is 70%. Systolic function is hyperdynamic. Wall motion is normal. Diastolic function is mildly abnormal, consistent with grade I  (abnormal) relaxation.  Left atrial filling pressure is elevated.    Right Ventricle: Right ventricular cavity size is normal. Systolic function is normal.    Right Atrium: The atrium is mildly dilated.    Aortic Valve: There is an Muñoz MAURICE 3 Ultra Resilia 29 mm TAVR bioprosthetic valve. The prosthetic valve appears well-seated and appears to be functioning normally. There is no evidence of paravalvular regurgitation. There is no evidence of transvalvular regurgitation. The gradient recorded across the prosthetic aortic valve is within the expected range. Aortic valve peak velocity is 2.01 m/s. AV mean gradient is 8 mmHg. DVI is 0.57. AV valve area is 1.78 cm2.    Mitral Valve: There is severe annular calcification. There is mild regurgitation. There is mild stenosis. MV mean gradient antegrade is 4 mmHg at a heart rate of 75 bpm.    Tricuspid Valve: There is mild regurgitation. The right ventricular systolic pressure is normal. The estimated right ventricular systolic pressure is 27.00 mmHg    Results Review Statement: I personally reviewed the following image studies in PACS and associated radiology reports: EKG, chest xray, and Echocardiogram. My interpretation of the radiology images/reports is: same as above.    Invasive Lines/Tubes:  Invasive Devices       Central Venous Catheter Line  Duration             CVC Central Lines 07/08/25 1 day              Peripheral Intravenous Line  Duration             Peripheral IV 04/29/25 Left Forearm 72 days    Peripheral IV 07/08/25 Right Forearm 2 days    Peripheral IV 07/08/25 Right Forearm 1 day                    Physical Exam:    General: No acute distress and Alert  HEENT/NECK:  Normocephalic. Atraumatic.  No jugular venous distention.    Cardiac: Regular rate and rhythm  Pulmonary:  Breath sounds clear bilaterally  Abdomen:  Non-tender, Non-distended, and Normal bowel sounds  Incisions: Groin puncture sites clean, dry, and intact without hematoma  Extremities:  Extremities warm/dry and Trace edema B/L  Neuro: AAOX 2, at baseline, calm/pleasant, doesn't known year  Skin: Warm/Dry, without rashes or lesions.     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)    Moderate protein-calorie malnutrition (HCC)    Bladder mass    Malignant neoplasm of lateral wall of urinary bladder (HCC)    S/P TAVR (transcatheter aortic valve replacement)    Other emphysema (HCC)    Multiple pulmonary nodules    Seizures (HCC)    At risk for delirium    Frailty    Ambulatory dysfunction       Severe AS S/P L TF TAVR 29mm S3 UR POD # 2    Plan:    Cardiac:     Normal ventricular systolic function, EF 70%    NSR;   HR controlled  BP improved from yest AM, -140    HTN Regimen:  Continue home toprol XL 25 mg BID at reduced dose (50mg BID preop)  Continue home losartan 25 mg daily (100mg daily preop)    TAVR anticoagulation regimen:   ASA/Plavix (new to plavix)    Postoperative transthoracic echocardiogram:  Echo completed; Well seated AV, without PVL/AI, normal LVEF    Continue Statin therapy    Central IV access no longer required; Remove central venous catheter today    Continue Arixtra for DVT prophylaxis    Pulmonary:     Good Room air oxygen saturation; Continue incentive spirometry/Coughing/Deep breathing exercises    Renal:     Normal preoperative renal function    Intake/Output net: even    Diuretic Regimen:  Responded well to lasix 40 IV x 1 2 days ago  Not on diuretic at home  Euvolemic, normal LVEF  Hold on further diuresis    Yoder removed    Neuro:    Baseline mild-mod dementia  Pleasant and calm this AM  Neurologically intact; No active issues   - Cont home aricept and namenda     Hx of seizures  - cont home tegretol    Incisional pain well-controlled; Continue prn  Tylenol    GI:    Tolerating clear liquid diet, without evidence of dysphagia; Initiate oral diet with 1800 mL fluid restriction    Tolerating diet without complaint    Continue stool softeners and prn suppository    Continue GI prophylaxis    Endo:     Glucose well-controlled with insulin sliding scale coverage    7.  Hematology:     Post-operative blood count acceptable; Trend prn    8.  Disposition:    Gerontology consultation ordered for routine assessment of TAVR patient over 65 years old    Anticipated discharge date: today       VTE Pharmacologic Prophylaxis: Fondaparinux (Arixtra)  VTE Mechanical Prophylaxis: sequential compression device    Collaborative rounds completed with supervising physician  Plan of care discussed with bedside nurse    SIGNATURE: Kunal Salvador PA-C  DATE: July 10, 2025  TIME: 7:23 AM

## 2025-07-11 NOTE — UTILIZATION REVIEW
NOTIFICATION OF ADMISSION DISCHARGE   This is a Notification of Discharge from [facility]. Please be advised that this patient has been discharge from our facility. Below you will find the admission and discharge date and time including the patient’s disposition.   UTILIZATION REVIEW CONTACT:  Utilization Review Assistants  Network Utilization Review Department  Phone: [phone] x carefully listen to the prompts. All voicemails are confidential.  Email: [email or web address]     ADMISSION INFORMATION  PRESENTATION DATE: 7/8/2025  5:21 AM  OBERVATION ADMISSION DATE: N/A  INPATIENT ADMISSION DATE: 7/8/25  7:14 AM   DISCHARGE DATE: 7/10/2025  2:03 PM   DISPOSITION:Home with Home Health Care    [facility] Utilization Review Department  ATTENTION: Please call with any questions or concerns to [phone] and carefully listen to the prompts so that you are directed to the right person. All voicemails are confidential.   For Discharge needs, contact Care Management DC Support Team at [phone] opt. 2  Send all requests for admission clinical reviews, approved or denied determinations and any other requests to dedicated fax number below belonging to the campus where the patient is receiving treatment. List of dedicated fax numbers for the Facilities:  FACILITY NAME UR FAX NUMBER   ADMISSION DENIALS (Administrative/Medical Necessity) [phone]   DISCHARGE SUPPORT TEAM ([facility]) [phone]   PARENT CHILD HEALTH (Maternity/NICU/Pediatrics) [phone]   [facility] [phone]   [facility] [phone]   [facility] [phone]   [facility] [phone]   [facility] [phone]   [facility] [phone]   [facility] [phone]   [facility] [phone]   [facility]  [facility] [phone]   [facility] [phone]   [facility] [phone]   [facility] [phone]

## 2025-07-15 RX ORDER — ATORVASTATIN CALCIUM 40 MG/1
40 TABLET, FILM COATED ORAL EVERY EVENING
Qty: 30 TABLET | Refills: 5 | OUTPATIENT
Start: 2025-07-15

## 2025-07-15 RX ORDER — METOPROLOL SUCCINATE 50 MG/1
50 TABLET, EXTENDED RELEASE ORAL 2 TIMES DAILY
Qty: 60 TABLET | Refills: 5 | OUTPATIENT
Start: 2025-07-15

## 2025-07-17 ENCOUNTER — TELEPHONE (OUTPATIENT)
Dept: CARDIAC SURGERY | Facility: CLINIC | Age: 75
End: 2025-07-17

## 2025-07-17 NOTE — TELEPHONE ENCOUNTER
Called patient to perform routine post op follow-up after hospital discharge s/p TAVR. No answer. Left voicemail to call office [phone] with questions or concerns.

## 2025-07-23 NOTE — PROGRESS NOTES
Cardiology Follow Up    Jon Morton  1950  [id number]  [facility]  [address] [facility] [address] [phone]    Assessment & Plan  S/P TAVR (transcatheter aortic valve replacement)  7/08/25 sp TF TAVR #29mm Muñoz MAURICE 3 Ultra Resilia bioprosthetic valve via a percutaneous left transfemoral approach by Dr Faulkner.  Continue on aspirin 81 mg daily, Lipitor 40 mg daily, Plavix 75 mg daily, losartan 25 mg daily, metoprolol succinate 25 mg twice daily.  Start amlodipine 5 mg daily for improved blood pressure control.  Continue on a heart healthy 2 g sodium diet.  He does not have his own dentin.  Cardiac rehabilitation in the near future. Transportation may be a difficulty and may prevent Jon from attending Cardiac rehabilitation.   Primary hypertension  LUE sitting 140/70  Continue on losartan 25 mg daily, metoprolol succinate 25 mg twice daily,  Start amlodipine 5 mg daily  DASH diet   Mixed hyperlipidemia  11/02/24 , TG 58, HDL 67,   LDL goal is 70  Lipitor 40 mg daily.  Will need a fasting lipid profile in 3 to 6 months per PCP or primary cardiologist.      Interval History:   Mr Jon Morton was admitted to [facility] on 7/08 - 7/10/25 with severe AS.  Jon was electively admitted and Underwent TF TAVR #29mm Muñoz MAURICE 3 Ultra Resilia bioprosthetic valve via a percutaneous left transfemoral approach by Dr Faulkner.  Chest x-ray showed chronic emphysema changes and pulmonary congestion he was treated with IV Lasix 40 mg x 1 dose with good response.  Discharged home on POD#2.    Jon presents to our office for a recent hospitalization follow up visit.  He denies dyspnea with minimal or moderate exertion chest pain palpitation lightheadedness or dizziness.  He does not have any of his own dentin.  To dentist he is taking all his medications as prescribed.      Medical History    Primary Cardiologist Dr Zavala  Severe AS  Hypertension  Hyperlipidemia 24 , TG 58, HDL 67,   Bladder mass  Mild Dimentia      25 cardiac catheterization no obstructive epicardial CAD.    Problem List[1]  Past Medical History[2]  Social History     Socioeconomic History    Marital status:      Spouse name: Not on file    Number of children: Not on file    Years of education: Not on file    Highest education level: Not on file   Occupational History    Not on file   Tobacco Use    Smoking status: Former     Current packs/day: 0.00     Types: Cigarettes     Quit date:      Years since quittin.6     Passive exposure: Past    Smokeless tobacco: Never   Vaping Use    Vaping status: Never Used   Substance and Sexual Activity    Alcohol use: Not Currently    Drug use: Never    Sexual activity: Not Currently   Other Topics Concern    Not on file   Social History Narrative    ** Merged History Encounter **          Social Drivers of Health     Financial Resource Strain: Not on file   Food Insecurity: Patient Unable To Answer (2025)    Nursing - Inadequate Food Risk Classification     Worried About Running Out of Food in the Last Year: Never true     Ran Out of Food in the Last Year: Never true     Ran Out of Food in the Last Year: Patient unable to answer   Transportation Needs: Patient Unable To Answer (2025)    Nursing - Transportation Risk Classification     Lack of Transportation: Not on file     Lack of Transportation: Patient unable to answer   Physical Activity: Not on file   Stress: Not on file   Social Connections: Unknown (2024)    Received from [employer]    Social Connections     How often do you feel lonely or isolated from those around you? (Adult - for ages 18 years and over): Not on file   Intimate Partner Violence: Patient Unable To Answer (2025)    Nursing IPS     Feels Physically and Emotionally Safe: Not on file     Physically Hurt by Someone: Not  on file     Humiliated or Emotionally Abused by Someone: Not on file     Physically Hurt by Someone: Patient unable to answer     Hurt or Threatened by Someone: Patient unable to answer   Housing Stability: Patient Unable To Answer (7/8/2025)    Nursing: Inadequate Housing Risk Classification     Has Housing: Not on file     Worried About Losing Housing: Not on file     Unable to Get Utilities: Not on file     Unable to Pay for Housing in the Last Year: Patient unable to answer     Has Housing: Patient unable to answer      Family History[3]  Past Surgical History[4]  Current Medications[5]  No Known Allergies    Labs:  No results displayed because visit has over 200 results.      Appointment on 06/27/2025   Component Date Value    MRSA Culture Only 06/27/2025 No Methicillin Resistant Staphlyococcus aureus (MRSA) isolated     Protime 06/27/2025 13.4     INR 06/27/2025 0.99     WBC 06/27/2025 5.59     RBC 06/27/2025 4.40     Hemoglobin 06/27/2025 13.8     Hematocrit 06/27/2025 42.1     MCV 06/27/2025 96     MCH 06/27/2025 31.4     MCHC 06/27/2025 32.8     RDW 06/27/2025 13.5     Platelets 06/27/2025 196     MPV 06/27/2025 9.7     ABO Grouping 06/27/2025 O     Rh Factor 06/27/2025 Positive     Antibody Screen 06/27/2025 Positive     Specimen Expiration Date 06/27/2025 [id number]     Sodium 06/27/2025 136     Potassium 06/27/2025 4.5     Chloride 06/27/2025 100     CO2 06/27/2025 30     ANION GAP 06/27/2025 6     BUN 06/27/2025 21     Creatinine 06/27/2025 0.99     Glucose, Fasting 06/27/2025 83     Calcium 06/27/2025 9.2     AST 06/27/2025 24     ALT 06/27/2025 15     Alkaline Phosphatase 06/27/2025 144 (H)     Total Protein 06/27/2025 7.2     Albumin 06/27/2025 4.1     Total Bilirubin 06/27/2025 0.48     eGFR 06/27/2025 73      Imaging: XR chest portable  Result Date: 7/9/2025  Narrative: XR CHEST PORTABLE INDICATION: Post Open Heart Surgey. COMPARISON: Chest radiograph 7/8/2025 FINDINGS: The provide internal jugular  central venous catheter projects over the superior cavoatrial junction. Normal-sized cardiomediastinal silhouette. Post TAVR. Pulmonary emphysema. Resolved or decreased interstitial edema. No definite pleural effusion. No pneumothorax.     Impression: Resolved or decreased interstitial edema. Workstation performed: [id number]     XR chest portable  Result Date: 7/9/2025  Narrative: XR CHEST PORTABLE INDICATION: s/p tavr. COMPARISON: Chest radiograph 3/11/2025 FINDINGS: Tip of right internal jugular intravenous catheter projects over the superior cavoatrial junction. Pulmonary emphysema. Bilateral reticular opacities. No pneumothorax or pleural effusion. Normal cardiomediastinal silhouette. Post TAVR Bones are unremarkable for age. Normal upper abdomen.     Impression: Possible interstitial edema. Workstation performed: [id number]     Echo complete w/ contrast if indicated  Result Date: 7/8/2025  Narrative:   Left Ventricle: Left ventricular cavity size is normal. Wall thickness is moderately increased. There is moderate concentric hypertrophy. The left ventricular ejection fraction is 70%. Systolic function is hyperdynamic. Wall motion is normal. Diastolic function is mildly abnormal, consistent with grade I (abnormal) relaxation.  Left atrial filling pressure is elevated.   Right Ventricle: Right ventricular cavity size is normal. Systolic function is normal.   Right Atrium: The atrium is mildly dilated.   Aortic Valve: There is an Muñoz MAURICE 3 Ultra Resilia 29 mm TAVR bioprosthetic valve. The prosthetic valve appears well-seated and appears to be functioning normally. There is no evidence of paravalvular regurgitation. There is no evidence of transvalvular regurgitation. The gradient recorded across the prosthetic aortic valve is within the expected range. Aortic valve peak velocity is 2.01 m/s. AV mean gradient is 8 mmHg. DVI is 0.57. AV valve area is 1.78 cm2.   Mitral Valve: There is severe annular  calcification. There is mild regurgitation. There is mild stenosis. MV mean gradient antegrade is 4 mmHg at a heart rate of 75 bpm.   Tricuspid Valve: There is mild regurgitation. The right ventricular systolic pressure is normal. The estimated right ventricular systolic pressure is 27.00 mmHg.     KATIE intraop interventional w/realtime guidance of cardiac procedures  Result Date: 2025  Narrative: This order contains the linked images for the KATIE that was performed by the Anesthesiologist.  Please see the  CARDIAC KATIE ANESTHESIA procedure for results.    Cardiac catheterization  Result Date: 2025  Narrative: OPERATIVE REPORT PATIENT NAME: Jon Morton  :  1950 MRN: [id number] Pt Location: [facility] HYBRID OR ROOM 02 SURGERY DATE: 2025 Surgeons and Role: Panel 1:    * Aris Faulkner DO - Primary    * Kunal Salvador PA-C - Assisting Panel 2:    * Charlie Rangel DO - Primary Co-Surgeons: Charlie Rangel DO; Aris Faulkner DO PREOPERATIVE DIAGNOSIS Symptomatic severe aortic stenosis. POSTOPERATIVE DIAGNOSIS Symptomatic severe aortic stenosis. PROCEDURE Transcatheter aortic valve replacement with a 29 mm Muñoz MAURICE 3 ULTRA RESILIA bioprosthetic valve via a percutaneous left transfemoral approach. ANESTHESIA Dr. Manuela Raya, general endotracheal anesthesia with transesophageal echocardiogram guidance. After informed consent was obtained, patient brought to hybrid operating room in fasting state. Time out was performed. Using modified seldinger technique sheaths were placed in the left  common  femoral artery and vein respectively. The left  common  femoral artery was also used for placement of delivery sheath. The vessel was accessed using modified seldinger technique.  Using fluoroscopy a temporary wire was advanced into RV apex through transfemoral sheath.  The coplanar view was obtained using pigtail catheter placed in ascending aorta with subsequent ascending aortography. The  TAVR delivery sheath was ulltimately advanced over a stiff wire.  Next the 29 mm Muñoz MAURICE 3 ULTRA RESILIA valve was implanted using rapid pacing with fluoro and KATIE guidance.  There was no significant paravalvular leak appreciated post implant. The sheaths were removed and hemostasis obtained by manual compression of the venous sheath.  The TAVR delivery sheath was removed and percutaneous closure was obtained using Preclose technique with two Proglide devices deployed pre sheath insertion. Patient left the hybrid operating room in stable condition. Impression.  Successful 29mm Muñoz MAURICE 3 ULTRA RESILIA transcatheter aortic valve replacement. SIGNATURE: Charlie Rangel DO DATE: July 8, 2025 TIME: 9:37 AM NOTE: A cardiac surgeon as co-surgeon was required as is a CMS requirement as well as needed for conventional open (non catheter based) surgical skills should become necessary.    KATIE Anesthesia  Result Date: 7/8/2025  Narrative: Cayla Ulloa CRNA     7/8/2025  8:41 AM Procedure Performed: KATIE Anesthesia Start Time:  7/8/2025 8:10 AM Preanesthesia Checklist Patient identified, IV assessed, risks and benefits discussed, monitors and equipment assessed, procedure being performed at surgeon's request and anesthesia consent obtained. Procedure Diagnostic Indications for KATIE:  assessment of ascending aorta, assessment of surgical repair, hemodynamic monitoring and suspected pericardial effusion. Type of KATIE: interventional KATIE with real time guidance of intracardiac procedure. Images Saved: ultrasound permanent image saved. Physician Requesting Echo: Aris Faulkner DO.  Location performed: OR. Intubated.  Heart visualized. Insertion of KATIE Probe:  Easy. Probe Type:  Epiaortic and multiplane. Modalities:  Color flow mapping, 3D, continuous wave Doppler and pulse wave Doppler. Echocardiographic and Doppler Measurements PREPROCEDURE LEFT VENTRICLE: Systolic Function: normal. Ejection Fraction: 50-55%.  Cavity size: normal.   Regional Wall Motion Abnormalities: none. RIGHT VENTRICLE: Systolic Function: normal.  Cavity size normal. No hypertrophy  AORTIC VALVE: Leaflets: trileaflet. Leaflet motions restricted. Stenosis: severe. Mean Gradient: 40 mmHg. Peak Gradient: 68 mmHg.   Regurgitation: trace.  MITRAL VALVE: Leaflets: calcified. Leaflet Motions: normal. Regurgitation: mild.   Stenosis: none.   TRICUSPID VALVE: Leaflets: normal. Leaflet Motions: normal. Stenosis: none. Regurgitation: mild. PULMONIC VALVE: Leaflets: normal. Regurgitation: none. Stenosis: none. ASCENDING AORTA: Size:  normal.  Dissection not present.  AORTIC ARCH: Size:  normal.  dissection not present. Grade 3: atheroma protruding < 0.5 cm into lumen. DESCENDING AORTA: Size: normal.  Dissection not present. Grade 3: atheroma protruding < 0.5 cm into lumen. RIGHT ATRIUM: Size:  normal. Spontaneous echo contrast. LEFT ATRIUM: Size: normal. Spontaneous echo contrast. LEFT ATRIAL APPENDAGE: Size: normal. Spontaneous echo contrast. ATRIAL SEPTUM: Intra-atrial septal morphology: normal.  VENTRICULAR SEPTUM: Intra-ventricular septum morphology: normal. OTHER FINDINGS: Pericardium:  normal. Pleural Effusion:  none. POSTPROCEDURE LEFT VENTRICLE: Unchanged .    RIGHT VENTRICLE: Unchanged .  AORTIC VALVE: Leaflets: bioprosthetic. Stenosis: none. Mean Gradient: 4 mmHg. Regurgitation: Trace to mild and trace.  Valve Size: 29 mm. MITRAL VALVE: Unchanged .    TRICUSPID VALVE: Unchanged .   PULMONIC VALVE: Unchanged   ATRIA: Unchanged .   AORTA: Unchanged . REMOVAL: Probe Removal: atraumatic.        Review of Systems:  Review of Systems   Unable to perform ROS: Dementia   HENT:  Positive for dental problem.         No dentin    Skin:         Bruising     All other systems reviewed and are negative.      Physical Exam:  Physical Exam  Vitals reviewed.   Constitutional:       Appearance: Normal appearance.   HENT:      Head: Normocephalic.     Cardiovascular:       Rate and Rhythm: Normal rate and regular rhythm.      Pulses: Normal pulses.      Heart sounds: Normal heart sounds.   Pulmonary:      Effort: Pulmonary effort is normal.      Breath sounds: Normal breath sounds.     Musculoskeletal:         General: Normal range of motion.      Cervical back: Normal range of motion and neck supple.      Right lower leg: No edema.      Left lower leg: No edema.     Skin:     General: Skin is warm and dry.      Capillary Refill: Capillary refill takes less than 2 seconds.      Comments: Multiple ecchymotic areas on right arm     Neurological:      Mental Status: He is alert.     Psychiatric:         Mood and Affect: Mood normal.         Behavior: Behavior normal.                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)    Moderate protein-calorie malnutrition (HCC)    Severe aortic stenosis    Hematuria    Bladder mass    Acute metabolic encephalopathy    Malignant neoplasm of lateral wall of urinary bladder (HCC)    S/P cardiac cath    S/P TAVR (transcatheter aortic valve replacement)    Other emphysema (HCC)    Multiple pulmonary nodules    Seizures (HCC)    At risk for delirium    Frailty    Ambulatory dysfunction   [2]   Past Medical History:  Diagnosis Date    Aortic stenosis     Bladder cancer (HCC)     Dementia (HCC)     Hx of bladder cancer     Hypercholesterolemia     Hyperlipidemia     Hypertension     Memory difficulties     Neuropathy     Seizures (HCC)     Stroke (HCC)    [3]   Family History  Problem Relation Name Age of Onset    Heart attack Mother      Heart disease Father      No Known Problems Sister      No Known Problems Brother      Post-traumatic stress disorder Niece Trupti     Anxiety disorder Niece Trupti     PKU Niece Trupti    [4]   Past Surgical History:  Procedure  Laterality Date    BACK SURGERY      CARDIAC CATHETERIZATION N/A 4/29/2025    Procedure: Cardiac RHC/LHC;  Surgeon: Benton Finch MD;  Location: [facility];  Service: Cardiology    CARDIAC CATHETERIZATION N/A 7/8/2025    Procedure: Cardiac tavr;  Surgeon: Charlie Rangel DO;  Location: [facility];  Service: Cardiology    DENTAL SURGERY      TX REPLACE AORTIC VALVE OPENFEMORAL ARTERY APPROACH N/A 7/8/2025    Procedure: REPLACEMENT AORTIC VALVE TRANSCATHETER (TAVR) TRANSFEMORAL W/ 29MM VALVE(ACCESS ON LEFT) KATIE;  Surgeon: Aris Faulkner DO;  Location: [facility];  Service: Cardiac Surgery   [5]   Current Outpatient Medications:     aspirin 81 mg chewable tablet, Chew 1 tablet (81 mg total) daily, Disp: 30 tablet, Rfl: 0    atorvastatin (LIPITOR) 40 mg tablet, TAKE 1 TABLET (40 MG TOTAL) BY MOUTH EVERY EVENING, Disp: 30 tablet, Rfl: 5    carBAMazepine (TEGretol XR) 400 mg 12 hr tablet, Take 1 tablet (400 mg total) by mouth 2 (two) times a day, Disp: 60 tablet, Rfl: 6    clopidogrel (PLAVIX) 75 mg tablet, Take 1 tablet (75 mg total) by mouth daily Take one tablet once daily x 90 days then stop, Disp: 90 tablet, Rfl: 0    cyanocobalamin (VITAMIN B-12) 1000 MCG tablet, Take 1 tablet (1,000 mcg total) by mouth daily, Disp: 90 tablet, Rfl: 0    donepezil (ARICEPT) 10 mg tablet, Take 10 mg by mouth daily at bedtime 1 tab Daily, Disp: , Rfl:     gabapentin (Neurontin) 300 mg capsule, Take 1 capsule (300 mg total) by mouth daily at bedtime (Patient taking differently: Take 300 mg by mouth daily at bedtime Once at night), Disp: 30 capsule, Rfl: 6    losartan (COZAAR) 25 mg tablet, Take 1 tablet (25 mg total) by mouth daily Dose reduced from 100mg to 25mg daily until further instruction, Disp: 30 tablet, Rfl: 0    memantine (NAMENDA) 10 mg tablet, Take 1 tablet by mouth in the morning and 1 tablet before bedtime., Disp: , Rfl:     metoprolol succinate (TOPROL-XL) 25 mg 24 hr tablet, Take 1 tablet (25 mg total)  by mouth in the morning and 1 tablet (25 mg total) before bedtime. Dose reduced from 50mg twice daily to 25mg twice daily until further instruction., Disp: 60 tablet, Rfl: 0

## 2025-07-24 ENCOUNTER — OFFICE VISIT (OUTPATIENT)
Dept: CARDIOLOGY CLINIC | Facility: CLINIC | Age: 75
End: 2025-07-24
Payer: MEDICARE

## 2025-07-24 VITALS
HEART RATE: 86 BPM | HEIGHT: 77 IN | OXYGEN SATURATION: 97 % | WEIGHT: 197.1 LBS | DIASTOLIC BLOOD PRESSURE: 74 MMHG | SYSTOLIC BLOOD PRESSURE: 144 MMHG | BODY MASS INDEX: 23.27 KG/M2

## 2025-07-24 DIAGNOSIS — I10 PRIMARY HYPERTENSION: ICD-10-CM

## 2025-07-24 DIAGNOSIS — E78.2 MIXED HYPERLIPIDEMIA: ICD-10-CM

## 2025-07-24 DIAGNOSIS — Z95.2 S/P TAVR (TRANSCATHETER AORTIC VALVE REPLACEMENT): Primary | ICD-10-CM

## 2025-07-24 PROCEDURE — 99214 OFFICE O/P EST MOD 30 MIN: CPT | Performed by: NURSE PRACTITIONER

## 2025-07-24 RX ORDER — AMLODIPINE BESYLATE 5 MG/1
5 TABLET ORAL DAILY
Qty: 30 TABLET | Refills: 3 | Status: SHIPPED | OUTPATIENT
Start: 2025-07-24

## 2025-07-24 NOTE — ASSESSMENT & PLAN NOTE
11/02/24 , TG 58, HDL 67,   LDL goal is 70  Lipitor 40 mg daily.  Will need a fasting lipid profile in 3 to 6 months per PCP or primary cardiologist.

## 2025-07-24 NOTE — ASSESSMENT & PLAN NOTE
EMIGDIO sitting 140/70  Continue on losartan 25 mg daily, metoprolol succinate 25 mg twice daily,  Start amlodipine 5 mg daily  DASH diet

## 2025-07-24 NOTE — ASSESSMENT & PLAN NOTE
7/08/25 sp TF TAVR #29mm Muñoz MAURICE 3 Ultra Resilia bioprosthetic valve via a percutaneous left transfemoral approach by Dr Faulkner.  Continue on aspirin 81 mg daily, Lipitor 40 mg daily, Plavix 75 mg daily, losartan 25 mg daily, metoprolol succinate 25 mg twice daily.  Start amlodipine 5 mg daily for improved blood pressure control.  Continue on a heart healthy 2 g sodium diet.  He does not have his own dentin.  Cardiac rehabilitation in the near future. Transportation may be a difficulty and may prevent Jon from attending Cardiac rehabilitation.

## 2025-08-07 ENCOUNTER — OFFICE VISIT (OUTPATIENT)
Dept: CARDIOLOGY CLINIC | Facility: CLINIC | Age: 75
End: 2025-08-07
Payer: MEDICARE

## 2025-08-07 ENCOUNTER — APPOINTMENT (OUTPATIENT)
Dept: LAB | Facility: MEDICAL CENTER | Age: 75
End: 2025-08-07
Payer: MEDICARE

## 2025-08-07 VITALS
HEART RATE: 76 BPM | HEIGHT: 77 IN | OXYGEN SATURATION: 99 % | BODY MASS INDEX: 23.27 KG/M2 | SYSTOLIC BLOOD PRESSURE: 160 MMHG | WEIGHT: 197.1 LBS | DIASTOLIC BLOOD PRESSURE: 98 MMHG

## 2025-08-07 DIAGNOSIS — N32.89 BLADDER MASS: ICD-10-CM

## 2025-08-07 DIAGNOSIS — E78.2 MIXED HYPERLIPIDEMIA: Primary | ICD-10-CM

## 2025-08-07 DIAGNOSIS — F03.A0 MILD DEMENTIA WITHOUT BEHAVIORAL DISTURBANCE, PSYCHOTIC DISTURBANCE, MOOD DISTURBANCE, OR ANXIETY, UNSPECIFIED DEMENTIA TYPE (HCC): ICD-10-CM

## 2025-08-07 DIAGNOSIS — I10 PRIMARY HYPERTENSION: ICD-10-CM

## 2025-08-07 DIAGNOSIS — Z95.2 S/P TAVR (TRANSCATHETER AORTIC VALVE REPLACEMENT): ICD-10-CM

## 2025-08-07 LAB
ALBUMIN SERPL BCG-MCNC: 4 G/DL (ref 3.5–5)
ALP SERPL-CCNC: 134 U/L (ref 34–104)
ALT SERPL W P-5'-P-CCNC: 14 U/L (ref 7–52)
ANION GAP SERPL CALCULATED.3IONS-SCNC: 10 MMOL/L (ref 4–13)
AST SERPL W P-5'-P-CCNC: 23 U/L (ref 13–39)
BASOPHILS # BLD AUTO: 0.04 THOUSANDS/ÂΜL (ref 0–0.1)
BASOPHILS NFR BLD AUTO: 1 % (ref 0–1)
BILIRUB SERPL-MCNC: 0.59 MG/DL (ref 0.2–1)
BUN SERPL-MCNC: 16 MG/DL (ref 5–25)
CALCIUM SERPL-MCNC: 9.4 MG/DL (ref 8.4–10.2)
CHLORIDE SERPL-SCNC: 100 MMOL/L (ref 96–108)
CO2 SERPL-SCNC: 29 MMOL/L (ref 21–32)
CREAT SERPL-MCNC: 1.02 MG/DL (ref 0.6–1.3)
EOSINOPHIL # BLD AUTO: 0.06 THOUSAND/ÂΜL (ref 0–0.61)
EOSINOPHIL NFR BLD AUTO: 1 % (ref 0–6)
ERYTHROCYTE [DISTWIDTH] IN BLOOD BY AUTOMATED COUNT: 13.7 % (ref 11.6–15.1)
GFR SERPL CREATININE-BSD FRML MDRD: 71 ML/MIN/1.73SQ M
GLUCOSE SERPL-MCNC: 96 MG/DL (ref 65–140)
HCT VFR BLD AUTO: 41.9 % (ref 36.5–49.3)
HGB BLD-MCNC: 13.4 G/DL (ref 12–17)
IMM GRANULOCYTES # BLD AUTO: 0.02 THOUSAND/UL (ref 0–0.2)
IMM GRANULOCYTES NFR BLD AUTO: 0 % (ref 0–2)
LYMPHOCYTES # BLD AUTO: 1.06 THOUSANDS/ÂΜL (ref 0.6–4.47)
LYMPHOCYTES NFR BLD AUTO: 15 % (ref 14–44)
MCH RBC QN AUTO: 31.5 PG (ref 26.8–34.3)
MCHC RBC AUTO-ENTMCNC: 32 G/DL (ref 31.4–37.4)
MCV RBC AUTO: 98 FL (ref 82–98)
MONOCYTES # BLD AUTO: 0.49 THOUSAND/ÂΜL (ref 0.17–1.22)
MONOCYTES NFR BLD AUTO: 7 % (ref 4–12)
NEUTROPHILS # BLD AUTO: 5.36 THOUSANDS/ÂΜL (ref 1.85–7.62)
NEUTS SEG NFR BLD AUTO: 76 % (ref 43–75)
NRBC BLD AUTO-RTO: 0 /100 WBCS
PLATELET # BLD AUTO: 158 THOUSANDS/UL (ref 149–390)
PMV BLD AUTO: 9.5 FL (ref 8.9–12.7)
POTASSIUM SERPL-SCNC: 4.4 MMOL/L (ref 3.5–5.3)
PROT SERPL-MCNC: 7.1 G/DL (ref 6.4–8.4)
RBC # BLD AUTO: 4.26 MILLION/UL (ref 3.88–5.62)
SODIUM SERPL-SCNC: 139 MMOL/L (ref 135–147)
WBC # BLD AUTO: 7.03 THOUSAND/UL (ref 4.31–10.16)

## 2025-08-07 PROCEDURE — 99214 OFFICE O/P EST MOD 30 MIN: CPT | Performed by: INTERNAL MEDICINE

## 2025-08-07 RX ORDER — AMOXICILLIN 500 MG/1
500 TABLET, FILM COATED ORAL ONCE AS NEEDED
Qty: 10 TABLET | Refills: 0 | Status: SHIPPED | OUTPATIENT
Start: 2025-08-07 | End: 2025-08-07

## 2025-08-07 RX ORDER — LOSARTAN POTASSIUM 25 MG/1
25 TABLET ORAL 2 TIMES DAILY
Qty: 180 TABLET | Refills: 33 | Status: SHIPPED | OUTPATIENT
Start: 2025-08-07

## 2025-08-07 RX ORDER — AMOXICILLIN 500 MG/1
2000 TABLET, FILM COATED ORAL ONCE AS NEEDED
Qty: 12 TABLET | Refills: 0 | Status: SHIPPED | OUTPATIENT
Start: 2025-08-07 | End: 2025-08-27

## 2025-08-08 ENCOUNTER — RESULTS FOLLOW-UP (OUTPATIENT)
Age: 75
End: 2025-08-08

## 2025-08-12 ENCOUNTER — OFFICE VISIT (OUTPATIENT)
Dept: UROLOGY | Facility: CLINIC | Age: 75
End: 2025-08-12
Payer: MEDICARE

## 2025-08-15 ENCOUNTER — TELEPHONE (OUTPATIENT)
Dept: CARDIAC SURGERY | Facility: CLINIC | Age: 75
End: 2025-08-15

## (undated) DEVICE — RADIFOCUS OPTITORQUE ANGIOGRAPHIC CATHETER: Brand: OPTITORQUE

## (undated) DEVICE — CARDIOVASCULAR SPLIT DRAPE: Brand: CONVERTORS

## (undated) DEVICE — Device

## (undated) DEVICE — DGW .035 FC STR 260CM TEF: Brand: EMERALD

## (undated) DEVICE — PINNACLE INTRODUCER SHEATH: Brand: PINNACLE

## (undated) DEVICE — INTENDED FOR TISSUE SEPARATION, AND OTHER PROCEDURES THAT REQUIRE A SHARP SURGICAL BLADE TO PUNCTURE OR CUT.: Brand: BARD-PARKER ® CARBON RIB-BACK BLADES

## (undated) DEVICE — TR BAND RADIAL ARTERY COMPRESSION DEVICE: Brand: TR BAND

## (undated) DEVICE — THERMOFLECT BLANKET, L, 25EA                               TS THERMOFLECT BLANKET, 48" X 84", SILVER, 5/BG, 5 BG/CS NW: Brand: THERMOFLECT

## (undated) DEVICE — HEAVY DUTY TABLE COVER: Brand: CONVERTORS

## (undated) DEVICE — BETHLEHEM MAJOR GENERAL PACK: Brand: CARDINAL HEALTH

## (undated) DEVICE — GUIDEWIRE WHOLEY HI TORQUE INTERM MOD J .035 145CM

## (undated) DEVICE — GLIDESHEATH BASIC HYDROPHILIC COATED INTRODUCER SHEATH: Brand: GLIDESHEATH

## (undated) DEVICE — AMPLATZ EXTRA STIFF WIRE GUIDE: Brand: AMPLATZ

## (undated) DEVICE — REM POLYHESIVE ADULT PATIENT RETURN ELECTRODE: Brand: VALLEYLAB

## (undated) DEVICE — DGW .035 FC J3MM 260CM TEF: Brand: EMERALD

## (undated) DEVICE — DGW .035 FC J3MM 150CM TEF: Brand: EMERALD

## (undated) DEVICE — 3000CC GUARDIAN II: Brand: GUARDIAN

## (undated) DEVICE — SWAN-GANZ BIPOLAR PACING CATHETER: Brand: SWAN-GANZ

## (undated) DEVICE — EXOFIN PRECISION PEN HIGH VISCOSITY TOPICAL SKIN ADHESIVE: Brand: EXOFIN PRECISION PEN, 1G

## (undated) DEVICE — CATH F5 INF PIG145 110CM 6SH: Brand: INFINITI